# Patient Record
Sex: FEMALE | Race: BLACK OR AFRICAN AMERICAN | NOT HISPANIC OR LATINO | Employment: OTHER | URBAN - METROPOLITAN AREA
[De-identification: names, ages, dates, MRNs, and addresses within clinical notes are randomized per-mention and may not be internally consistent; named-entity substitution may affect disease eponyms.]

---

## 2023-04-13 DIAGNOSIS — I10 PRIMARY HYPERTENSION: Primary | ICD-10-CM

## 2023-04-13 RX ORDER — AMLODIPINE AND BENAZEPRIL HYDROCHLORIDE 10; 40 MG/1; MG/1
1 CAPSULE ORAL DAILY
COMMUNITY
Start: 2022-09-22 | End: 2023-04-13 | Stop reason: SDUPTHER

## 2023-04-13 RX ORDER — AMLODIPINE AND BENAZEPRIL HYDROCHLORIDE 10; 40 MG/1; MG/1
1 CAPSULE ORAL DAILY
Qty: 30 CAPSULE | Refills: 0 | Status: SHIPPED | OUTPATIENT
Start: 2023-04-13 | End: 2024-03-29 | Stop reason: SDUPTHER

## 2023-04-20 ENCOUNTER — TELEPHONE (OUTPATIENT)
Dept: PRIMARY CARE | Facility: CLINIC | Age: 68
End: 2023-04-20
Payer: MEDICARE

## 2023-04-20 DIAGNOSIS — R07.9 CHEST PAIN, UNSPECIFIED TYPE: ICD-10-CM

## 2023-04-20 NOTE — TELEPHONE ENCOUNTER
Patient complains of chest pain. Asking to be seen [facility]. Your schedule is full. Please advise.

## 2023-10-08 PROBLEM — A60.09 GENITAL HERPES IN WOMEN: Status: ACTIVE | Noted: 2023-10-08

## 2023-10-08 PROBLEM — M79.652 PAIN OF LEFT THIGH: Status: ACTIVE | Noted: 2023-10-08

## 2023-10-08 PROBLEM — R07.89 ATYPICAL CHEST PAIN: Status: ACTIVE | Noted: 2023-10-08

## 2023-10-08 PROBLEM — J34.89 NASAL AND SINUS DISCHARGE: Status: ACTIVE | Noted: 2023-10-08

## 2023-10-08 PROBLEM — M54.32 SCIATICA OF LEFT SIDE: Status: ACTIVE | Noted: 2023-10-08

## 2023-10-08 PROBLEM — R07.9 CHEST PAIN: Status: ACTIVE | Noted: 2023-10-08

## 2023-10-08 PROBLEM — N63.31 UNSPECIFIED LUMP IN AXILLARY TAIL OF THE RIGHT BREAST: Status: ACTIVE | Noted: 2023-10-08

## 2023-10-08 PROBLEM — N63.20 BREAST MASS, LEFT: Status: ACTIVE | Noted: 2023-10-08

## 2023-10-08 PROBLEM — M79.605 LEFT LEG PAIN: Status: ACTIVE | Noted: 2023-10-08

## 2023-10-08 PROBLEM — J32.4 CHRONIC PANSINUSITIS: Status: ACTIVE | Noted: 2023-10-08

## 2023-10-08 PROBLEM — N64.59 NIPPLE PROBLEM: Status: ACTIVE | Noted: 2023-10-08

## 2023-10-08 PROBLEM — M43.6 NECK STIFFNESS: Status: ACTIVE | Noted: 2023-10-08

## 2023-10-08 PROBLEM — R29.6 FREQUENT FALLS: Status: ACTIVE | Noted: 2023-10-08

## 2023-10-08 PROBLEM — R63.4 WEIGHT LOSS, UNINTENTIONAL: Status: ACTIVE | Noted: 2023-10-08

## 2023-10-08 PROBLEM — R22.31 MASS OF RIGHT AXILLA: Status: ACTIVE | Noted: 2023-10-08

## 2023-10-08 PROBLEM — R26.9 GAIT ABNORMALITY: Status: ACTIVE | Noted: 2023-10-08

## 2023-10-08 PROBLEM — R10.9 ABDOMINAL PAIN: Status: ACTIVE | Noted: 2023-10-08

## 2023-10-08 PROBLEM — M25.511 PAIN IN RIGHT SHOULDER: Status: ACTIVE | Noted: 2023-10-08

## 2023-10-08 PROBLEM — E87.6 HYPOKALEMIA: Status: ACTIVE | Noted: 2023-10-08

## 2023-10-08 PROBLEM — E78.5 HLD (HYPERLIPIDEMIA): Status: ACTIVE | Noted: 2023-10-08

## 2023-10-08 PROBLEM — M96.1 CERVICAL POST-LAMINECTOMY SYNDROME: Status: ACTIVE | Noted: 2023-10-08

## 2023-10-08 PROBLEM — C50.919 BREAST CANCER (MULTI): Status: ACTIVE | Noted: 2023-10-08

## 2023-10-08 PROBLEM — N63.10 BREAST MASS, RIGHT: Status: ACTIVE | Noted: 2023-10-08

## 2023-10-08 PROBLEM — R92.8 ABNORMAL MAMMOGRAM: Status: ACTIVE | Noted: 2023-10-08

## 2023-10-08 PROBLEM — D80.2 IGA DEFICIENCY (MULTI): Status: ACTIVE | Noted: 2023-10-08

## 2023-10-08 PROBLEM — R07.89 CHEST WALL PAIN: Status: ACTIVE | Noted: 2023-10-08

## 2023-10-08 PROBLEM — I10 BENIGN ESSENTIAL HTN: Status: ACTIVE | Noted: 2023-10-08

## 2023-10-08 PROBLEM — L43.8 OTHER LICHEN PLANUS: Status: ACTIVE | Noted: 2020-05-11

## 2023-10-08 PROBLEM — G72.9 CERVICAL MYOPATHY: Status: ACTIVE | Noted: 2023-10-08

## 2023-10-08 PROBLEM — S42.279D: Status: ACTIVE | Noted: 2023-10-08

## 2023-10-08 PROBLEM — M25.561 RIGHT KNEE PAIN: Status: ACTIVE | Noted: 2023-10-08

## 2023-10-08 PROBLEM — J34.3 HYPERTROPHY OF BOTH INFERIOR NASAL TURBINATES: Status: ACTIVE | Noted: 2023-10-08

## 2023-10-08 PROBLEM — M25.562 LEFT KNEE PAIN: Status: ACTIVE | Noted: 2023-10-08

## 2023-10-08 PROBLEM — R00.1 SINUS BRADYCARDIA: Status: ACTIVE | Noted: 2023-10-08

## 2023-10-08 PROBLEM — M79.18 PAIN IN LEFT BUTTOCK: Status: ACTIVE | Noted: 2023-10-08

## 2023-10-08 PROBLEM — R59.1 LYMPHADENOPATHY: Status: ACTIVE | Noted: 2023-10-08

## 2023-10-08 PROBLEM — T81.30XA WOUND DEHISCENCE: Status: ACTIVE | Noted: 2023-10-08

## 2023-10-08 PROBLEM — E11.9 DM (DIABETES MELLITUS), TYPE 2 (MULTI): Status: ACTIVE | Noted: 2023-10-08

## 2023-10-08 PROBLEM — S42.211S: Status: ACTIVE | Noted: 2023-10-08

## 2023-10-08 PROBLEM — R21 RASH AND OTHER NONSPECIFIC SKIN ERUPTION: Status: ACTIVE | Noted: 2020-05-11

## 2023-10-08 PROBLEM — L02.91 ABSCESS: Status: ACTIVE | Noted: 2023-10-08

## 2023-10-08 PROBLEM — R59.0 AXILLARY ADENOPATHY: Status: ACTIVE | Noted: 2023-10-08

## 2023-10-08 RX ORDER — ACETAMINOPHEN 325 MG/1
650 TABLET ORAL EVERY 6 HOURS PRN
COMMUNITY
Start: 2021-07-28

## 2023-10-08 RX ORDER — PREDNISONE 20 MG/1
20 TABLET ORAL
COMMUNITY
Start: 2019-10-25

## 2023-10-08 RX ORDER — DOCUSATE SODIUM 100 MG/1
100 CAPSULE, LIQUID FILLED ORAL 2 TIMES DAILY
COMMUNITY
Start: 2021-07-28

## 2023-10-08 RX ORDER — CETIRIZINE HYDROCHLORIDE 10 MG/1
1 TABLET ORAL DAILY
COMMUNITY
Start: 2022-09-13

## 2023-10-08 RX ORDER — CEPHALEXIN 500 MG/1
1 TABLET ORAL 3 TIMES DAILY
COMMUNITY
Start: 2023-03-06

## 2023-10-08 RX ORDER — NITROGLYCERIN 0.4 MG/1
0.4 TABLET SUBLINGUAL EVERY 5 MIN PRN
COMMUNITY
Start: 2021-06-25

## 2023-10-08 RX ORDER — BETAMETHASONE DIPROPIONATE 0.5 MG/G
1 OINTMENT TOPICAL
COMMUNITY
Start: 2019-10-25

## 2023-10-08 RX ORDER — CYCLOBENZAPRINE HCL 10 MG
1 TABLET ORAL EVERY 8 HOURS PRN
COMMUNITY
Start: 2021-08-10

## 2023-10-08 RX ORDER — TRIAMCINOLONE ACETONIDE 1 MG/G
1 CREAM TOPICAL
COMMUNITY
Start: 2019-10-14

## 2023-10-08 RX ORDER — METFORMIN HYDROCHLORIDE 500 MG/1
1 TABLET ORAL
COMMUNITY
Start: 2020-11-09

## 2023-10-08 RX ORDER — IBUPROFEN 600 MG/1
1 TABLET ORAL 3 TIMES DAILY
COMMUNITY
Start: 2021-05-10

## 2023-10-08 RX ORDER — FLUTICASONE PROPIONATE 50 MCG
2 SPRAY, SUSPENSION (ML) NASAL DAILY
COMMUNITY
Start: 2023-03-29

## 2023-10-08 RX ORDER — TAMOXIFEN CITRATE 20 MG/1
1 TABLET ORAL DAILY
COMMUNITY
End: 2024-04-15 | Stop reason: ALTCHOICE

## 2023-10-08 RX ORDER — AMLODIPINE AND BENAZEPRIL HYDROCHLORIDE 10; 20 MG/1; MG/1
1 CAPSULE ORAL DAILY
COMMUNITY
Start: 2022-09-22 | End: 2024-03-29 | Stop reason: WASHOUT

## 2023-10-08 RX ORDER — LIDOCAINE 50 MG/G
PATCH TOPICAL
COMMUNITY
Start: 2021-07-28

## 2023-10-08 RX ORDER — ATORVASTATIN CALCIUM 20 MG/1
1 TABLET, FILM COATED ORAL DAILY
COMMUNITY
Start: 2020-11-09

## 2023-10-09 ENCOUNTER — PROCEDURE VISIT (OUTPATIENT)
Dept: DERMATOLOGY | Facility: CLINIC | Age: 68
End: 2023-10-09
Payer: MEDICARE

## 2023-10-09 DIAGNOSIS — L72.3 SEBACEOUS CYST: ICD-10-CM

## 2023-10-09 PROCEDURE — 12031 INTMD RPR S/A/T/EXT 2.5 CM/<: CPT | Performed by: STUDENT IN AN ORGANIZED HEALTH CARE EDUCATION/TRAINING PROGRAM

## 2023-10-09 PROCEDURE — 88305 TISSUE EXAM BY PATHOLOGIST: CPT | Performed by: DERMATOLOGY

## 2023-10-09 PROCEDURE — 11402 EXC TR-EXT B9+MARG 1.1-2 CM: CPT | Performed by: STUDENT IN AN ORGANIZED HEALTH CARE EDUCATION/TRAINING PROGRAM

## 2023-10-09 PROCEDURE — 88305 TISSUE EXAM BY PATHOLOGIST: CPT

## 2023-10-11 LAB
LABORATORY COMMENT REPORT: NORMAL
PATH REPORT.FINAL DX SPEC: NORMAL
PATH REPORT.GROSS SPEC: NORMAL
PATH REPORT.RELEVANT HX SPEC: NORMAL
PATH REPORT.TOTAL CANCER: NORMAL

## 2023-10-23 ENCOUNTER — OFFICE VISIT (OUTPATIENT)
Dept: DERMATOLOGY | Facility: CLINIC | Age: 68
End: 2023-10-23
Payer: MEDICARE

## 2023-10-23 DIAGNOSIS — L72.3 SEBACEOUS CYST: Primary | ICD-10-CM

## 2023-10-23 DIAGNOSIS — Z48.02 ENCOUNTER FOR REMOVAL OF SUTURES: ICD-10-CM

## 2023-10-23 PROCEDURE — 99214 OFFICE O/P EST MOD 30 MIN: CPT | Performed by: STUDENT IN AN ORGANIZED HEALTH CARE EDUCATION/TRAINING PROGRAM

## 2023-10-23 PROCEDURE — 11900 INJECT SKIN LESIONS </W 7: CPT

## 2023-10-23 PROCEDURE — 1159F MED LIST DOCD IN RCRD: CPT | Performed by: STUDENT IN AN ORGANIZED HEALTH CARE EDUCATION/TRAINING PROGRAM

## 2023-10-23 PROCEDURE — 1126F AMNT PAIN NOTED NONE PRSNT: CPT | Performed by: STUDENT IN AN ORGANIZED HEALTH CARE EDUCATION/TRAINING PROGRAM

## 2023-10-23 RX ORDER — DOXYCYCLINE HYCLATE 100 MG
100 TABLET ORAL 2 TIMES DAILY
Qty: 20 TABLET | Refills: 0 | Status: SHIPPED | OUTPATIENT
Start: 2023-10-23 | End: 2023-11-02

## 2023-10-23 NOTE — PROGRESS NOTES
Subjective     Marsha Tapia is a 68 y.o. female who presents for the following: Cyst (FUV - Cyst Removal. Right Axilla.).     Patient s/p excision of lesion in right axilla on 10/9/23 with Dr. Tamayo. Path showed keratin abscess and granuloma. Patient returns to clinic today for suture removal and re-evaluation of sebaceous cyst in right axilla. Patient states that 3 days ago cyst became more painful and started draining yellow/green fluid.     Review of Systems:  No other skin or systemic complaints other than what is documented elsewhere in the note.    The following portions of the chart were reviewed this encounter and updated as appropriate:          Skin Cancer History  No skin cancer on file.      Specialty Problems          Dermatology Problems    Other lichen planus    Rash and other nonspecific skin eruption    Wound dehiscence        Objective   Well appearing patient in no apparent distress; mood and affect are within normal limits.    A focused skin examination was performed. All findings within normal limits unless otherwise noted below.    Assessment/Plan   1. Sebaceous cyst  Right Axilla  Skin colored mobile cyst in right axilla with small amount of yellow/green discharge    -S/p excision on 10/9/23 with Dr. Tamayo. Path demonstrated a keratin abscess and granuloma   -Discussed treatment options including intralesional kenalog vs referral to general surgery for excision. After discussion of risks, benefits, and alternatives patient opts to proceed with intralesional kenalog injections.  -Administered 1cc of intralesional kenalog 10mg/ml to 10 sites 0.1cc per injection at approximately 0.5-1cm apart. Lot # [id number], Exp: 10/2025  -START doxycycline 100mg BID x 10 days. Instructed patient to take with food and do not lie down for at least 30 minutes after.   -RTC in 1 month for re-evaluation and possible repeat ILK injection  -Referral placed to general surgery for surgical consultation.        Intralesional injection - Right Axilla  Intralesional Injection:   Date/Time: 10/23/2023 9:14 AM    Consent:     Consent obtained:  Verbal    Consent given by:  Patient    Risks discussed:  Poor cosmetic result, pain, infection and bleeding    Alternatives discussed:  No treatment and alternative treatment  Pre-Procedure Details:   Timeout:  patient name, date of birth, surgical site, and procedure verified    Prep Type:  Isopropyl alcohol  Procedure Details:   Injection:  Triamcinolone  Outcome: patient tolerated procedure well  Post-procedure details: sterile dressing applied and wound care instructions given  Dressing type: bandage     Related Procedures  Referral to General Surgery  Follow Up In Dermatology - Established Patient    Related Medications  doxycycline (Vibra-Tabs) 100 mg tablet  Take 1 tablet (100 mg) by mouth 2 times a day for 10 days. Take with a full glass of water and do not lie down for at least 30 minutes after.    triamcinolone acetonide (Kenalog) injection 1 mg      2. Encounter for removal of sutures  Right Axilla  Skin well approximated. Sutures intact.     -Sutures removed.       RTC in 4 weeks for re-evaluation and possible repeat ILK injections.

## 2023-11-21 ENCOUNTER — OFFICE VISIT (OUTPATIENT)
Dept: DERMATOLOGY | Facility: CLINIC | Age: 68
End: 2023-11-21
Payer: MEDICARE

## 2023-11-21 DIAGNOSIS — L72.3 SEBACEOUS CYST: ICD-10-CM

## 2023-11-21 PROCEDURE — 99213 OFFICE O/P EST LOW 20 MIN: CPT | Performed by: STUDENT IN AN ORGANIZED HEALTH CARE EDUCATION/TRAINING PROGRAM

## 2023-11-21 PROCEDURE — 1159F MED LIST DOCD IN RCRD: CPT | Performed by: STUDENT IN AN ORGANIZED HEALTH CARE EDUCATION/TRAINING PROGRAM

## 2023-11-21 PROCEDURE — 1126F AMNT PAIN NOTED NONE PRSNT: CPT | Performed by: STUDENT IN AN ORGANIZED HEALTH CARE EDUCATION/TRAINING PROGRAM

## 2023-11-21 ASSESSMENT — DERMATOLOGY PATIENT ASSESSMENT
DO YOU HAVE ANY NEW OR CHANGING LESIONS: NO
DO YOU HAVE IRREGULAR MENSTRUAL CYCLES: NO
HAVE YOU HAD OR DO YOU HAVE A STAPH INFECTION: NO
DO YOU USE A TANNING BED: NO
ARE YOU AN ORGAN TRANSPLANT RECIPIENT: NO
ARE YOU ON BIRTH CONTROL: NO
ARE YOU TRYING TO GET PREGNANT: NO
HAVE YOU HAD OR DO YOU HAVE VASCULAR DISEASE: NO

## 2023-11-21 ASSESSMENT — DERMATOLOGY QUALITY OF LIFE (QOL) ASSESSMENT
RATE HOW EMOTIONALLY BOTHERED YOU ARE BY YOUR SKIN PROBLEM (FOR EXAMPLE, WORRY, EMBARRASSMENT, FRUSTRATION): 0 - NEVER BOTHERED
ARE THERE EXCLUSIONS OR EXCEPTIONS FOR THE QUALITY OF LIFE ASSESSMENT: NO
DID YOU DOCUMENT A PATIENT REASON(S) FOR NOT USING THE QUALITY OF LIFE ASSESSMENT: NO
RATE HOW BOTHERED YOU ARE BY SYMPTOMS OF YOUR SKIN PROBLEM (EG, ITCHING, STINGING BURNING, HURTING OR SKIN IRRITATION): 0 - NEVER BOTHERED
DID THE PATIENT HAVE A SEVERE MENTAL AND/OR PHYSICAL INCAPACITY THAT PREVENTED HIM OR HER FROM COMPLETING THE ASSESSMENT: NO
WAS THE PATIENT DIAGNOSED WITH A SKIN CONDITION THAT IS INCLUDED IN THE DENOMINATOR DEFINITION (E.G.PSORIASIS, DERMATITIS) BUT IDENTIFIED A SKIN CONDITION THAT IS NOT (E.G. MELANOMA, NEVI) THE MAIN CONDITION ON THEIR ASSESSMENT: NO
DATE THE QUALITY-OF-LIFE ASSESSMENT WAS COMPLETED: 66799
RATE HOW BOTHERED YOU ARE BY EFFECTS OF YOUR SKIN PROBLEMS ON YOUR ACTIVITIES (EG, GOING OUT, ACCOMPLISHING WHAT YOU WANT, WORK ACTIVITIES OR YOUR RELATIONSHIPS WITH OTHERS): 0 - NEVER BOTHERED

## 2023-11-21 ASSESSMENT — ITCH NUMERIC RATING SCALE: HOW SEVERE IS YOUR ITCHING?: 0

## 2023-12-12 ENCOUNTER — TELEPHONE (OUTPATIENT)
Dept: DERMATOLOGY | Facility: CLINIC | Age: 68
End: 2023-12-12
Payer: MEDICARE

## 2023-12-15 NOTE — PROGRESS NOTES
I was present during all key portions of visit including history, exam, discussion/plan and/or procedures and directly supervised our resident during all portions of the visit, follow up care, medications and more    MD Maximiliano English MD

## 2023-12-17 ENCOUNTER — HOSPITAL ENCOUNTER (EMERGENCY)
Facility: HOSPITAL | Age: 68
Discharge: HOME | End: 2023-12-17
Attending: EMERGENCY MEDICINE
Payer: MEDICARE

## 2023-12-17 VITALS
WEIGHT: 185 LBS | BODY MASS INDEX: 31.58 KG/M2 | TEMPERATURE: 99 F | HEIGHT: 64 IN | DIASTOLIC BLOOD PRESSURE: 100 MMHG | SYSTOLIC BLOOD PRESSURE: 173 MMHG | HEART RATE: 63 BPM | RESPIRATION RATE: 16 BRPM | OXYGEN SATURATION: 97 %

## 2023-12-17 DIAGNOSIS — B34.9 VIRAL ILLNESS: Primary | ICD-10-CM

## 2023-12-17 LAB
FLUAV RNA RESP QL NAA+PROBE: NOT DETECTED
FLUBV RNA RESP QL NAA+PROBE: NOT DETECTED
SARS-COV-2 RNA RESP QL NAA+PROBE: NOT DETECTED

## 2023-12-17 PROCEDURE — 87636 SARSCOV2 & INF A&B AMP PRB: CPT | Performed by: STUDENT IN AN ORGANIZED HEALTH CARE EDUCATION/TRAINING PROGRAM

## 2023-12-17 PROCEDURE — 2500000004 HC RX 250 GENERAL PHARMACY W/ HCPCS (ALT 636 FOR OP/ED): Performed by: STUDENT IN AN ORGANIZED HEALTH CARE EDUCATION/TRAINING PROGRAM

## 2023-12-17 PROCEDURE — 99284 EMERGENCY DEPT VISIT MOD MDM: CPT | Mod: 25 | Performed by: EMERGENCY MEDICINE

## 2023-12-17 PROCEDURE — 96374 THER/PROPH/DIAG INJ IV PUSH: CPT

## 2023-12-17 RX ORDER — ACETAMINOPHEN 325 MG/1
650 TABLET ORAL ONCE
Status: COMPLETED | OUTPATIENT
Start: 2023-12-17 | End: 2023-12-17

## 2023-12-17 RX ORDER — HYDRALAZINE HYDROCHLORIDE 20 MG/ML
10 INJECTION INTRAMUSCULAR; INTRAVENOUS ONCE
Status: COMPLETED | OUTPATIENT
Start: 2023-12-17 | End: 2023-12-17

## 2023-12-17 RX ADMIN — ACETAMINOPHEN 650 MG: 325 TABLET ORAL at 10:52

## 2023-12-17 RX ADMIN — HYDRALAZINE HYDROCHLORIDE 10 MG: 20 INJECTION INTRAMUSCULAR; INTRAVENOUS at 10:53

## 2023-12-17 ASSESSMENT — COLUMBIA-SUICIDE SEVERITY RATING SCALE - C-SSRS
2. HAVE YOU ACTUALLY HAD ANY THOUGHTS OF KILLING YOURSELF?: NO
6. HAVE YOU EVER DONE ANYTHING, STARTED TO DO ANYTHING, OR PREPARED TO DO ANYTHING TO END YOUR LIFE?: NO
1. IN THE PAST MONTH, HAVE YOU WISHED YOU WERE DEAD OR WISHED YOU COULD GO TO SLEEP AND NOT WAKE UP?: NO

## 2023-12-17 ASSESSMENT — PAIN - FUNCTIONAL ASSESSMENT: PAIN_FUNCTIONAL_ASSESSMENT: 0-10

## 2023-12-17 ASSESSMENT — PAIN SCALES - GENERAL
PAINLEVEL_OUTOF10: 5 - MODERATE PAIN
PAINLEVEL_OUTOF10: 0 - NO PAIN
PAINLEVEL_OUTOF10: 0 - NO PAIN

## 2023-12-17 ASSESSMENT — PAIN DESCRIPTION - LOCATION: LOCATION: HEAD

## 2023-12-17 NOTE — ED PROVIDER NOTES
HPI:  Marsha Tapia is a 68 y.o. with a history of diabetes, hypertension, breast cancer status post chemoradiation in remission, R axillary sebaceous cyst, presents the emergency department with multiple concerns.  Patient states that she is primarily here due to concern for a recurrent right axillary abscess.  Endorses that she has had drainage and a shot done by dermatology, most recently in October.  At that time was recommended to follow-up with general surgery however due to resolution of the cyst, has not yet followed up with them.  Endorses since Thursday, she has noticed pain in her right axilla, noticed some drainage which she describes as yellow and brown intermittently.  Does also endorse congestion, cough, noting that her 7-year-old family member has been feeling ill with similar symptoms.  She also states she has a slight headache, has been taking Mucinex, is unsure if this headache is related to her congestion or her blood pressure.  Denies any missed doses of her recent blood pressure medication.  She denies any chest pain, shortness of breath, nausea, vomiting, abdominal pain.  States she is otherwise well and healthy at her baseline state of health.  Limitations to History: None    External Records Reviewed: I reviewed recent and relevant outside records including: Outpatient records including dermatology visit most recently 11/21.  Of note, patient did have biopsy of that right axillary cyst showing keratin abscess and granuloma, was given intralesional Kenalog and Doxy.  Patient states she did attempt a follow-up with her dermatologist regarding this cyst however he is on vacation and she was not able to get an appointment soon.    ------------------------------------------------------------------------------------------------------------------------------------------    Physical Exam:    ED Triage Vitals [12/17/23 0952]   Temp Heart Rate Resp BP   37.2 °C (99 °F) 68 20 (!) 209/62      SpO2 Temp  Source Heart Rate Source Patient Position   97 % Temporal Monitor Sitting      BP Location FiO2 (%)     Left arm --         Gen: Alert, NAD   Head/Neck: NCAT, neck w/ FROM  Eyes: EOMI, PERRL, anicteric sclerae, noninjected conjunctivae  Nose: Nares patent w/o rhinorrhea  Mouth:  MMM, no OP lesions noted  Heart: RRR, well perfused  Lungs: CTA b/l no RRW, no increased work of breathing  Abdomen: soft, NT, ND, no rebound guarding or rigidity  Musculoskeletal: no joint swelling noted  Extremities: Warm, well perfused. Compartments soft, nontender. 2+ distal pulses, nontender to palpation.   Neurologic: Alert, symmetrical facies, phonates clearly, moves all extremities equally, responsive to touch, ambulates normally. CN II-XII intact.   Skin: Warm, dry. Small raised lesion R axilla with open area with minimal fatty tissue protrusion, no significant erythema, ttp or induration   Psychological: calm, no SI/HI    ------------------------------------------------------------------------------------------------------------------------------------------    Medical Decision Making  68-year-old female with past medical history of diabetes, hypertension, breast cancer status post chemo and radiation in remission, recurrent right axillary cyst, presenting to the emergency department with multiple concerns, primarily concerned regarding drainage from right axillary cyst.  Additionally endorsing congestion, mild headache, cough with positive sick contacts recently.  Vital signs on arrival do show elevated blood pressure, remaining are unremarkable.  Patient is nontoxic, neurologically intact, exam is as above.  No significant fluid collection appreciated on ultrasound, patient does have a small area of raised lesion in the right axilla, with open area, does appear to be fatty tissue, no significant purulence, erythema.  She is complaining of mild headache, frontal, is hypertensive will administer hydralazine, Tylenol and reevaluate.   COVID and flu swab sent and pending.  Headache is mild, patient is neurologically intact, at this time, do not believe that she needs a head CT.  Will additionally defer lab work at this time, she is very well-appearing, low suspicion for acute end organ damage from elevated blood pressure.  Blood pressure is improved, patient feels improved.  COVID and flu swabs are negative.  Discussed return precautions with the patient, she is discharged home in stable condition with close outpatient follow-up.    Diagnoses as of 12/17/23 1220   Viral illness        Procedures       Clinical Impression: congestion     Dispo: dc     Discussed with ED Attending, Dr. Calin Olivera, DO   Emergency Medicine, PGY3      Trey Olivera, DO  Resident  12/17/23 1220

## 2023-12-17 NOTE — DISCHARGE INSTRUCTIONS
Please make sure you follow-up with your primary care doctor, your dermatologist as discussed. You should return to the emergency department for any worsening or persistent symptoms including fevers, increasing area of redness, drainage, or if  the drain is start to smell bad.

## 2023-12-21 ENCOUNTER — OFFICE VISIT (OUTPATIENT)
Dept: DERMATOLOGY | Facility: CLINIC | Age: 68
End: 2023-12-21
Payer: MEDICARE

## 2023-12-21 DIAGNOSIS — L72.3 SEBACEOUS CYST: ICD-10-CM

## 2023-12-21 PROCEDURE — 87205 SMEAR GRAM STAIN: CPT

## 2023-12-21 PROCEDURE — 11900 INJECT SKIN LESIONS </W 7: CPT

## 2023-12-21 PROCEDURE — 1126F AMNT PAIN NOTED NONE PRSNT: CPT

## 2023-12-21 PROCEDURE — 1159F MED LIST DOCD IN RCRD: CPT

## 2023-12-21 PROCEDURE — 99204 OFFICE O/P NEW MOD 45 MIN: CPT

## 2023-12-21 RX ORDER — DOXYCYCLINE 100 MG/1
CAPSULE ORAL
Qty: 11 CAPSULE | Refills: 0 | Status: SHIPPED | OUTPATIENT
Start: 2023-12-21 | End: 2023-12-22

## 2023-12-22 RX ORDER — DOXYCYCLINE 100 MG/1
CAPSULE ORAL
Qty: 10 CAPSULE | Refills: 0 | Status: SHIPPED | OUTPATIENT
Start: 2023-12-22

## 2023-12-23 DIAGNOSIS — L72.3 SEBACEOUS CYST: Primary | ICD-10-CM

## 2023-12-23 RX ORDER — AMOXICILLIN AND CLAVULANATE POTASSIUM 500; 125 MG/1; MG/1
500 TABLET, FILM COATED ORAL 2 TIMES DAILY
Qty: 20 TABLET | Refills: 0 | Status: SHIPPED | OUTPATIENT
Start: 2023-12-23 | End: 2024-01-02

## 2023-12-23 NOTE — PROGRESS NOTES
Culture resulted showing group A strep. Changing from doxy to augmentin. Left pt a vm on need for picking up new medication and to stop taking doxy.

## 2023-12-24 LAB
BACTERIA SPEC CULT: ABNORMAL
BACTERIA SPEC CULT: ABNORMAL
GRAM STN SPEC: ABNORMAL
GRAM STN SPEC: ABNORMAL

## 2023-12-27 NOTE — PROGRESS NOTES
Subjective   HPI: Marsha Tapia is a 68 y.o. female is here for evaluation and treatment of an extremely painful sebaceous cyst underneath the right arm.     Patient has been being seen by Dr. Tamayo.  She is status post excision which was performed on 10/9/2023 by Dr. Tamayo.  During that time the path pathology demonstrated keratin abscess and granuloma.  On 10/23/2023 patient was seen by Dr. Tamayo for suture removal during that time she was prescribed doxycycline 100 mg tablet p.o. twice daily and given an intralesional injection of Kenalog 1 mg due to the area being painful and draining purulent material.  At the appointment on 10/23/2023 the wound was well-approximated.  During appointment on 10/23/2023 referral was placed for general surgery for complete excision of the sebaceous cyst as not all of it was removed. She was seen again by Dr. Tamayo of on 11/21/2023 at which time the cyst was no longer painful and had shrunk in size being 7 mm.  It was discussed at the appointment on 11/21/2023 that if the cyst had become inflamed again to schedule an appointment for repeat intralesional Kenalog.    On 12/17/2023 patient went to the ER for multiple concerns however her main concern was for a recurrent right axillary abscess.  Per note - she has a small ulcerated lesion to the right axilla that shows some mild serosanguineous fluid.  No purulence, erythema, tenderness to palpation and will not express fluid on attempt.  Does not appear infected at this time necessitating antibiotics. -No cultures were performed.    Patient is here today for an extremely painful draining cyst underneath the right arm.  She states that it has been draining for the past couple of days purulent material.  She is extremely nervous as she is recently become sick with a viral URI infection and is afraid that the cyst is causing systemic symptoms.  She is unhappy with what happened in the ER as she was not giving antibiotics for the cyst  and no cultures were performed.    ROS: No other skin or systemic complaints other than what is documented elsewhere in the note.    ALLERGIES: Patient has no known allergies.    SOCIAL:  reports that she has never smoked. She does not have any smokeless tobacco history on file. No history on file for alcohol use and drug use.    Objective   Right Axilla  Golf ball sized cyst with keratin material and serosanguineous fluid that is extremely tender to palpation and is erythematous        Assessment/Plan   1. Sebaceous cyst  Right Axilla    Discussed with patient that this is an active flare of the cyst once again.  I recommended starting doxycycline 100 mg capsule 2 today and then 1 p.o. daily x 9 days.  I performed a culture of the site and may need to switch antibiotics per culture results.  I recommended intralesional Kenalog injection as that seem to help in the past.     I discussed with patient that I cannot drain the area as it does not look as if there is material to drain.  I tried pressing on the area and only serosanguineous fluid was expressed.      I would like to see the patient back in 2 weeks.    Discussed wound care with patient.  Patient verbalizes understanding and is agreeable to this plan.    Specimen A - Tissue/Wound Culture/Smear  Differential Diagnosis: Cyst   Check Margins Yes/No?:    Comments:    [facility]ermpath Lab: N/A    Related Medications  triamcinolone acetonide (Kenalog) injection 1 mg      triamcinolone acetonide (Kenalog) injection 10 mg      doxycycline (Vibramycin) 100 mg capsule  Take 1 capsule PO every day x10 days. Take with at least 8 ounces (large glass) of water, do not lie down for 30 minutes after.         A significant amount of time was spent reading other providers notes and reviewing labs from other providers.    FOLLOW UP: 2 weeks    The patient was encouraged to contact me with any further questions or concerns.  Kellee Shell PA-C  12/27/2023

## 2024-01-01 NOTE — PROGRESS NOTES
Subjective   HPI: Marsha Tapia is a 68 y.o. female is here for 2 week follow up on sebaceus cyst of right armpit. Started on doxy 12/21/23 and culture done, switched to Augmentin 12/23/23 as culture grew GAS. At appt 12/21 intralesional kenalog was given.  Patient did  her and complete the Augmentin prescription.  Patient has noticed a significant improvement in pain and drainage of the site.  She does continue to have a little bit of seepage however nowhere near as much as before.    ROS: No other skin or systemic complaints other than what is documented elsewhere in the note.    ALLERGIES: Patient has no known allergies.    SOCIAL:  reports that she has never smoked. She does not have any smokeless tobacco history on file. No history on file for alcohol use and drug use.    Objective   Right Axilla  2.5 cm subcutaneous, mobile nodule with a central punctum. No longer inflamed. Minimal clear drainage. Significant improvement since last visit 12/21/23.        Assessment/Plan   1. Epidermoid cyst  Right Axilla    Patient is doing significantly better this visit.  Discussed with patient that the area needs completely excised with surgical removal.  Discussed with patient that this can only be done if the area is not inflamed.  I recommended if the area does become inflamed to please schedule an appointment and I will see her same day for intralesional Kenalog injections.  I recommended using Dove soap to clean with once daily.  If she is experiencing any drainage please come back in office.  I also recommended using bacitracin or Polysporin as a topical antibiotic.  Patient verbalizes understanding.         FOLLOW UP: Please schedule total excision of epidermal inclusion cyst of right axilla    The patient was encouraged to contact me with any further questions or concerns.  Kellee Shell PA-C  1/6/2024

## 2024-01-04 ENCOUNTER — OFFICE VISIT (OUTPATIENT)
Dept: DERMATOLOGY | Facility: CLINIC | Age: 69
End: 2024-01-04
Payer: MEDICARE

## 2024-01-04 DIAGNOSIS — L72.0 EPIDERMOID CYST: ICD-10-CM

## 2024-01-04 PROCEDURE — 1126F AMNT PAIN NOTED NONE PRSNT: CPT

## 2024-01-04 PROCEDURE — 99212 OFFICE O/P EST SF 10 MIN: CPT

## 2024-01-04 PROCEDURE — 1159F MED LIST DOCD IN RCRD: CPT

## 2024-01-12 ENCOUNTER — APPOINTMENT (OUTPATIENT)
Dept: DERMATOLOGY | Facility: CLINIC | Age: 69
End: 2024-01-12
Payer: MEDICARE

## 2024-01-15 ENCOUNTER — TELEPHONE (OUTPATIENT)
Dept: DERMATOLOGY | Facility: CLINIC | Age: 69
End: 2024-01-15
Payer: MEDICARE

## 2024-01-15 NOTE — TELEPHONE ENCOUNTER
PT called wanting to speak with you about what the next steps in care are for her (Cyst under Right Arm), she states she has not heard back from the other office about scheduling - please advise

## 2024-01-30 ENCOUNTER — PROCEDURE VISIT (OUTPATIENT)
Dept: DERMATOLOGY | Facility: CLINIC | Age: 69
End: 2024-01-30
Payer: MEDICARE

## 2024-01-30 DIAGNOSIS — L72.0 EPIDERMAL CYST: Primary | ICD-10-CM

## 2024-01-30 PROCEDURE — 99213 OFFICE O/P EST LOW 20 MIN: CPT | Performed by: SPECIALIST

## 2024-01-30 PROCEDURE — 87070 CULTURE OTHR SPECIMN AEROBIC: CPT | Performed by: SPECIALIST

## 2024-01-30 RX ORDER — AMOXICILLIN AND CLAVULANATE POTASSIUM 500; 125 MG/1; MG/1
500 TABLET, FILM COATED ORAL 2 TIMES DAILY
Qty: 20 TABLET | Refills: 0 | Status: SHIPPED | OUTPATIENT
Start: 2024-01-30 | End: 2024-02-09

## 2024-01-30 RX ORDER — MUPIROCIN 20 MG/G
OINTMENT TOPICAL
Qty: 15 G | Refills: 0 | Status: SHIPPED | OUTPATIENT
Start: 2024-01-30

## 2024-01-30 RX ORDER — FOAM BANDAGE 7" X 7"
BANDAGE TOPICAL
Qty: 10 EACH | Refills: 0 | Status: SHIPPED | OUTPATIENT
Start: 2024-01-30

## 2024-01-30 NOTE — PROGRESS NOTES
Subjective     Marsha Tapia is a 68 y.o. female who presents for the following: No chief complaint on file..     Review of Systems:  No other skin or systemic complaints other than what is documented elsewhere in the note.    The following portions of the chart were reviewed this encounter and updated as appropriate:         Skin Cancer History  No skin cancer on file.      Specialty Problems          Dermatology Problems    Other lichen planus    Rash and other nonspecific skin eruption    Wound dehiscence        Objective   Well appearing patient in no apparent distress; mood and affect are within normal limits.    A full examination was performed including scalp, head, eyes, ears, nose, lips, neck, chest, axillae, abdomen, back, buttocks, bilateral upper extremities, bilateral lower extremities, hands, feet, fingers, toes, fingernails, and toenails. All findings within normal limits unless otherwise noted below.    Assessment/Plan   1. Epidermal cyst  Right Axilla    Specimen A - Tissue/Wound Culture/Smear  Differential Diagnosis: cyst   Check Margins Yes/No?:    Comments:    [facility]erm[facility] Lab: N/A

## 2024-01-30 NOTE — PROGRESS NOTES
"Subjective   HPI: Marsha Tapia is a 68 y.o. female is here for evaluation and treatment of a cyst under my right arm.    ROS: No other skin or systemic complaints other than what is documented elsewhere in the note.    ALLERGIES: Patient has no known allergies.    SOCIAL:  reports that she has never smoked. She does not have any smokeless tobacco history on file. No history on file for alcohol use and drug use.    Objective     Description: This cyst has ruptured leading to purulent drainage within the axillary involved erythema and inflammation.  No surgical goal excision can be done today.  Unfortunately patient was quite upset and could not understand why surgery could not be performed.  I explained to her several times that no cyst wall was present there was only infection and inflammation and surgery could not be done in an area of this nature.    Assessment/Plan   1. Epidermal cyst  Right Axilla    Specimen A - Tissue/Wound Culture/Smear  Differential Diagnosis: cyst   Check Margins Yes/No?:    Comments:    [facility]erm[facility] Lab: N/A    Related Medications  foam bandage (Allevyn) 2 X 2 \" bandage  Apply to right axilla as directed    mupirocin (Bactroban) 2 % ointment  Apply a thin layer to the affected area three times a day x 1 week    amoxicillin-pot clavulanate (Augmentin) 500-125 mg tablet  Take 1 tablet (500 mg) by mouth 2 times a day for 10 days.    triamcinolone acetonide (Kenalog) injection 10 mg         Plan: Augmentin 500 mg p.o. twice daily for 10 days.  Culture and sensitivity of wound.  Mupirocin ointment 3 times daily.    FOLLOW UP: 2 weeks.    The patient was encouraged to contact me with any further questions or concerns.  John Aguila MD  1/30/2024    "

## 2024-02-12 ENCOUNTER — TELEPHONE (OUTPATIENT)
Dept: DERMATOLOGY | Facility: CLINIC | Age: 69
End: 2024-02-12

## 2024-02-12 NOTE — TELEPHONE ENCOUNTER
Cyst is still draining, have some concerns of an infection. Can you give her a call at your earliest convenience?

## 2024-02-15 ENCOUNTER — APPOINTMENT (OUTPATIENT)
Dept: DERMATOLOGY | Facility: CLINIC | Age: 69
End: 2024-02-15
Payer: MEDICARE

## 2024-03-12 ENCOUNTER — TELEPHONE (OUTPATIENT)
Dept: DERMATOLOGY | Facility: CLINIC | Age: 69
End: 2024-03-12

## 2024-03-12 NOTE — TELEPHONE ENCOUNTER
Marsha is having a problem with the bandages that are supposed to cover the cyst under her arm. Latosha could you call her at your earliest convenience? She thinks there might be a miscommunication with the company who Is supposed to supply them.

## 2024-03-29 ENCOUNTER — OFFICE VISIT (OUTPATIENT)
Dept: PRIMARY CARE | Facility: CLINIC | Age: 69
End: 2024-03-29
Payer: MEDICARE

## 2024-03-29 ENCOUNTER — HOSPITAL ENCOUNTER (OUTPATIENT)
Dept: RADIOLOGY | Facility: CLINIC | Age: 69
Discharge: HOME | End: 2024-03-29
Payer: MEDICARE

## 2024-03-29 VITALS
RESPIRATION RATE: 21 BRPM | OXYGEN SATURATION: 96 % | HEART RATE: 88 BPM | WEIGHT: 167 LBS | HEIGHT: 64 IN | SYSTOLIC BLOOD PRESSURE: 178 MMHG | DIASTOLIC BLOOD PRESSURE: 88 MMHG | BODY MASS INDEX: 28.51 KG/M2

## 2024-03-29 DIAGNOSIS — N61.1 ABSCESS OF THE BREAST AND NIPPLE: ICD-10-CM

## 2024-03-29 DIAGNOSIS — R22.31 MASS OF RIGHT AXILLA: ICD-10-CM

## 2024-03-29 DIAGNOSIS — I10 PRIMARY HYPERTENSION: ICD-10-CM

## 2024-03-29 DIAGNOSIS — R23.4 BREAST SKIN CHANGES: ICD-10-CM

## 2024-03-29 DIAGNOSIS — L02.91 ABSCESS: Primary | ICD-10-CM

## 2024-03-29 DIAGNOSIS — N64.9 DISORDER OF BREAST, UNSPECIFIED: ICD-10-CM

## 2024-03-29 DIAGNOSIS — L30.4 INTERTRIGO: ICD-10-CM

## 2024-03-29 DIAGNOSIS — L02.91 ABSCESS: ICD-10-CM

## 2024-03-29 PROCEDURE — 99214 OFFICE O/P EST MOD 30 MIN: CPT

## 2024-03-29 PROCEDURE — 76982 USE 1ST TARGET LESION: CPT | Mod: 50,RT

## 2024-03-29 PROCEDURE — G0279 TOMOSYNTHESIS, MAMMO: HCPCS | Performed by: RADIOLOGY

## 2024-03-29 PROCEDURE — 77066 DX MAMMO INCL CAD BI: CPT | Performed by: RADIOLOGY

## 2024-03-29 PROCEDURE — 76983 USE EA ADDL TARGET LESION: CPT

## 2024-03-29 PROCEDURE — 76642 ULTRASOUND BREAST LIMITED: CPT | Mod: 50

## 2024-03-29 PROCEDURE — 77062 BREAST TOMOSYNTHESIS BI: CPT

## 2024-03-29 PROCEDURE — 3077F SYST BP >= 140 MM HG: CPT

## 2024-03-29 PROCEDURE — 1159F MED LIST DOCD IN RCRD: CPT

## 2024-03-29 PROCEDURE — 3079F DIAST BP 80-89 MM HG: CPT

## 2024-03-29 PROCEDURE — 76642 ULTRASOUND BREAST LIMITED: CPT | Performed by: RADIOLOGY

## 2024-03-29 RX ORDER — NYSTATIN 100000 U/G
CREAM TOPICAL 2 TIMES DAILY
Qty: 30 G | Refills: 1 | Status: SHIPPED | OUTPATIENT
Start: 2024-03-29 | End: 2024-04-05

## 2024-03-29 RX ORDER — CLINDAMYCIN HYDROCHLORIDE 300 MG/1
300 CAPSULE ORAL 4 TIMES DAILY
Qty: 40 CAPSULE | Refills: 0 | Status: SHIPPED | OUTPATIENT
Start: 2024-03-29 | End: 2024-04-08

## 2024-03-29 RX ORDER — AMLODIPINE AND BENAZEPRIL HYDROCHLORIDE 10; 40 MG/1; MG/1
1 CAPSULE ORAL DAILY
Qty: 90 CAPSULE | Refills: 0 | Status: SHIPPED | OUTPATIENT
Start: 2024-03-29

## 2024-03-29 RX ORDER — HYDROCHLOROTHIAZIDE 12.5 MG/1
12.5 TABLET ORAL DAILY
Qty: 90 TABLET | Refills: 0 | Status: SHIPPED | OUTPATIENT
Start: 2024-03-29

## 2024-03-29 NOTE — PROGRESS NOTES
Primary Care Provider: Stephane Sandhu MD    Subjective   Marsha Tapia is a 68 y.o. female who presents for Follow-up (right breast issue need referral for diagnostic mammogram/Red spot wast there, no lump near nipple area/Has brown spots under her breast and body was under arm they drain and sometimes has an oder).    Had a red spot on her right breast- went away, but then came back    Abscess    Has been seeing dermatology for a right epidermal cyst   Was attempted to be removed but was draining and bleeding  Was given steroids and then started getting the dark spots under her breasts  Was also given Augmentin    ultrasound-guided biopsy of a right  palpable mass. Pathology shows fibrous tissue with marked acute  inflammation, numerous macrophages and granulation tissues consistent  with abscess    HTN    intertrigo           Review of Systems   Constitutional: Negative.  Negative for activity change, appetite change, chills, diaphoresis, fatigue, fever and unexpected weight change.   HENT: Negative.  Negative for congestion, dental problem, ear discharge, ear pain, hearing loss, mouth sores, nosebleeds, postnasal drip, rhinorrhea, sinus pressure, sneezing, sore throat, tinnitus, trouble swallowing and voice change.    Eyes: Negative.  Negative for photophobia, discharge and visual disturbance.   Respiratory: Negative.  Negative for apnea, cough, chest tightness, shortness of breath, wheezing and stridor.    Cardiovascular: Negative.  Negative for chest pain, palpitations and leg swelling.   Gastrointestinal: Negative.  Negative for abdominal distention, abdominal pain, anal bleeding, blood in stool, constipation, diarrhea, nausea and rectal pain.   Endocrine: Negative.  Negative for cold intolerance, heat intolerance, polydipsia, polyphagia and polyuria.   Genitourinary: Negative.  Negative for decreased urine volume, difficulty urinating, dysuria, flank pain, frequency, hematuria and urgency.   Musculoskeletal:  "Negative.  Negative for back pain, gait problem, joint swelling, myalgias, neck pain and neck stiffness.   Skin:         SEE HPI   Neurological: Negative.  Negative for dizziness, tremors, seizures, syncope, speech difficulty, weakness, light-headedness, numbness and headaches.   Hematological: Negative.  Does not bruise/bleed easily.   Psychiatric/Behavioral: Negative.  Negative for agitation, confusion, dysphoric mood, sleep disturbance and suicidal ideas. The patient is not nervous/anxious and is not hyperactive.    All other systems reviewed and are negative.        Objective   /88   Pulse 88   Resp 21   Ht 1.626 m (5' 4\")   Wt 75.8 kg (167 lb)   SpO2 96%   BMI 28.67 kg/m²     Physical Exam  Vitals reviewed.   Constitutional:       General: She is not in acute distress.     Appearance: Normal appearance. She is normal weight. She is not ill-appearing, toxic-appearing or diaphoretic.   HENT:      Head: Normocephalic and atraumatic.      Nose: Nose normal.   Eyes:      Extraocular Movements: Extraocular movements intact.      Conjunctiva/sclera: Conjunctivae normal.      Pupils: Pupils are equal, round, and reactive to light.   Neck:      Vascular: No carotid bruit.   Cardiovascular:      Rate and Rhythm: Normal rate and regular rhythm.      Pulses: Normal pulses.      Heart sounds: Normal heart sounds. No murmur heard.     No friction rub. No gallop.   Pulmonary:      Effort: Pulmonary effort is normal. No respiratory distress.      Breath sounds: Normal breath sounds.   Chest:      Chest wall: No mass.   Breasts:     Right: Skin change present.   Abdominal:      General: Abdomen is flat. Bowel sounds are normal.      Palpations: Abdomen is soft.   Musculoskeletal:         General: Normal range of motion.      Cervical back: Normal range of motion and neck supple.   Lymphadenopathy:      Cervical: No cervical adenopathy.      Upper Body:      Right upper body: No supraclavicular, axillary or pectoral " adenopathy.      Left upper body: No supraclavicular, axillary or pectoral adenopathy.   Skin:     General: Skin is warm and dry.      Capillary Refill: Capillary refill takes less than 2 seconds.   Neurological:      General: No focal deficit present.      Mental Status: She is alert and oriented to person, place, and time. Mental status is at baseline.   Psychiatric:         Mood and Affect: Mood normal.         Behavior: Behavior normal.         Thought Content: Thought content normal.         Judgment: Judgment normal.         Assessment/Plan   Problem List Items Addressed This Visit       Abscess - Primary    Relevant Orders    Referral to High Risk Breast Cancer    Mass of right axilla    Relevant Orders    Referral to High Risk Breast Cancer     Other Visit Diagnoses       Breast skin changes        Relevant Orders    Referral to High Risk Breast Cancer  Start Clindamycin  Diag mammo & US    Primary hypertension        Above goal  Relevant Medications    amLODIPine-benazepriL (Lotrel) 10-40 mg capsule    hydroCHLOROthiazide (Microzide) 12.5 mg tablet    Intertrigo      Nystatin cream    Disorder of breast, unspecified        Abscess of the breast and nipple       Start Clindamycin  Diag mammo & US  Follow up with high risk breast clinic     Follow up in 4-6 weeks or sooner if needed

## 2024-04-15 ENCOUNTER — OFFICE VISIT (OUTPATIENT)
Dept: PRIMARY CARE | Facility: CLINIC | Age: 69
End: 2024-04-15
Payer: MEDICARE

## 2024-04-15 VITALS
BODY MASS INDEX: 28.17 KG/M2 | DIASTOLIC BLOOD PRESSURE: 70 MMHG | HEART RATE: 68 BPM | WEIGHT: 165 LBS | SYSTOLIC BLOOD PRESSURE: 130 MMHG | TEMPERATURE: 98 F | RESPIRATION RATE: 16 BRPM | HEIGHT: 64 IN

## 2024-04-15 DIAGNOSIS — B35.4 TINEA CORPORIS: ICD-10-CM

## 2024-04-15 DIAGNOSIS — I10 BENIGN ESSENTIAL HTN: Primary | ICD-10-CM

## 2024-04-15 DIAGNOSIS — E55.9 VITAMIN D DEFICIENCY: ICD-10-CM

## 2024-04-15 DIAGNOSIS — E78.49 OTHER HYPERLIPIDEMIA: ICD-10-CM

## 2024-04-15 DIAGNOSIS — E11.65 TYPE 2 DIABETES MELLITUS WITH HYPERGLYCEMIA, WITHOUT LONG-TERM CURRENT USE OF INSULIN (MULTI): ICD-10-CM

## 2024-04-15 PROCEDURE — 3075F SYST BP GE 130 - 139MM HG: CPT | Performed by: INTERNAL MEDICINE

## 2024-04-15 PROCEDURE — 99214 OFFICE O/P EST MOD 30 MIN: CPT | Performed by: INTERNAL MEDICINE

## 2024-04-15 PROCEDURE — 3078F DIAST BP <80 MM HG: CPT | Performed by: INTERNAL MEDICINE

## 2024-04-15 PROCEDURE — 1159F MED LIST DOCD IN RCRD: CPT | Performed by: INTERNAL MEDICINE

## 2024-04-15 RX ORDER — NYSTATIN 100000 [USP'U]/G
1 POWDER TOPICAL 2 TIMES DAILY
Qty: 60 G | Refills: 1 | Status: SHIPPED | OUTPATIENT
Start: 2024-04-15 | End: 2025-04-15

## 2024-04-15 ASSESSMENT — ENCOUNTER SYMPTOMS
SPEECH DIFFICULTY: 0
DYSURIA: 0
POLYPHAGIA: 0
VOICE CHANGE: 0
RHINORRHEA: 0
MUSCULOSKELETAL NEGATIVE: 1
CARDIOVASCULAR NEGATIVE: 1
NAUSEA: 0
JOINT SWELLING: 0
BACK PAIN: 0
WHEEZING: 0
CONFUSION: 0
DYSPHORIC MOOD: 0
DIARRHEA: 0
COUGH: 0
FLANK PAIN: 0
PALPITATIONS: 0
ENDOCRINE NEGATIVE: 1
CONSTIPATION: 0
PSYCHIATRIC NEGATIVE: 1
HYPERACTIVE: 0
SEIZURES: 0
NEUROLOGICAL NEGATIVE: 1
ACTIVITY CHANGE: 0
WEAKNESS: 0
EYE DISCHARGE: 0
POLYDIPSIA: 0
TREMORS: 0
SHORTNESS OF BREATH: 0
HEMATOLOGIC/LYMPHATIC NEGATIVE: 1
ROS SKIN COMMENTS: SEE HPI
SINUS PRESSURE: 0
DIFFICULTY URINATING: 0
SLEEP DISTURBANCE: 0
BRUISES/BLEEDS EASILY: 0
DIAPHORESIS: 0
PHOTOPHOBIA: 0
CONSTITUTIONAL NEGATIVE: 1
APNEA: 0
RESPIRATORY NEGATIVE: 1
CHILLS: 0
RECTAL PAIN: 0
FREQUENCY: 0
HEADACHES: 0
FEVER: 0
EYES NEGATIVE: 1
UNEXPECTED WEIGHT CHANGE: 0
CHEST TIGHTNESS: 0
MYALGIAS: 0
APPETITE CHANGE: 0
NERVOUS/ANXIOUS: 0
BLOOD IN STOOL: 0
ABDOMINAL PAIN: 0
GASTROINTESTINAL NEGATIVE: 1
TROUBLE SWALLOWING: 0
AGITATION: 0
NECK STIFFNESS: 0
ANAL BLEEDING: 0
DIZZINESS: 0
LIGHT-HEADEDNESS: 0
ABDOMINAL DISTENTION: 0
STRIDOR: 0
NECK PAIN: 0
SORE THROAT: 0
HEMATURIA: 0
NUMBNESS: 0
FATIGUE: 0

## 2024-04-15 NOTE — PROGRESS NOTES
Marsha Tapia is a 68 y.o. female   Patient with a past medical history of breast CA s/p mastectomy, chemo and radiation, DM, HTN, HLD, IgA deficiency, chronic diarrhea, Elevated coronary calcium score of 208, Osteoarthritis, severe cervical central canal stenosis with cord compression and evidence of cord edema at C3-4 and C5-6,s/p C3-C6 laminoplasty on 7/23/21, Hearing loss, Colonoscopy due in 2031, Mammogram (March)     Had a cyst in breast  Removed and treated  Seeing a dermatologist  Negative mammogram    Does not check sugars or take her meds      No chest pain/  SOB/ dizziness  BM OK  Energy level ok  Appetite OK             Review of Systems     Constitutional: no fever, no chills, not feeling poorly, not feeling tired and no recent weight gain, no recent weight loss.   ENT: no earache, no hearing loss, no nosebleeds, no nasal discharge, no sore throat and no hoarseness.   Cardiovascular: the heart rate was not slow, the heart rate was not fast, no chest pain, no palpitations, no intermittent leg claudication and no lower extremity edema.   Respiratory: no cough, wheezing or shortness of breath at rest or exertion  Gastrointestinal: no abdominal pain, no constipation, no melena, no nausea, no diarrhea, no vomiting and no blood in stools.   Musculoskeletal: no arthralgias, no myalgias, no back pain, no joint swelling, no joint stiffness, no limb pain and no limb swelling.   Integumentary: Rash underneath the breast  Neurological: no headache, no confusion, no numbness, no dizziness, no tingling and no fainting.   All other systems have been reviewed and are negative for complaint.       Vitals:    04/15/24 1010   BP: 130/70   Pulse: 68   Resp: 16   Temp: 36.7 °C (98 °F)        Physical Exam     Constitutional   General appearance: Alert and in no acute distress.     Pulmonary   Respiratory assessment: No respiratory distress, normal respiratory rhythm and effort.    Auscultation of Lungs: Clear bilateral  breath sounds.   Cardiovascular   Auscultation of heart: Apical pulse normal, heart rate and rhythm normal, normal S1 and S2, no murmurs and no pericardial rub.    Exam for edema: No peripheral edema.   Abdomen   Abdominal Exam: No bruits, normal bowel sounds, soft, non-tender, no abdominal mass palpated.    Liver and Spleen exam: No hepato-splenomegaly.   Musculoskeletal   Examination of gait: Normal.    Inspection of digits and nails: No clubbing or cyanosis of the fingernails.    Inspection/palpation of joints, bones and muscles: No joint swelling. Normal movement of all extremities.   Skin   Skin inspection: Erythema rash underneath breasts  Neurologic   Cranial nerves: Nerves 2-12 were intact, no focal neuro defects.     Assessment/Plan          Patient with a past medical history of breast CA s/p mastectomy, chemo and radiation, DM, HTN, HLD, IgA deficiency, chronic diarrhea, Elevated coronary calcium score of 208, Osteoarthritis, severe cervical central canal stenosis with cord compression and evidence of cord edema at C3-4 and C5-6,s/p C3-C6 laminoplasty on 7/23/21, Hearing loss, Colonoscopy due in 2031, MAmmogram (March)    #Tinea corporis  Mostly wet  Will apply and prescribed nystatin powder twice a day    #Abscess under right axilla  Currently on antibiotics  Still drains  Will follow with her specialist    #Hypertension  Stable  Continue current medications    #Diabetes mellitus  Not taking any medications  Check A1c and blood work    #Dyslipidemia with elevated calcium scores  Check lipid panels    Complete blood work  Follow-up in 1 month

## 2024-04-16 ENCOUNTER — LAB (OUTPATIENT)
Dept: LAB | Facility: LAB | Age: 69
End: 2024-04-16
Payer: MEDICARE

## 2024-04-16 DIAGNOSIS — E11.65 TYPE 2 DIABETES MELLITUS WITH HYPERGLYCEMIA, WITHOUT LONG-TERM CURRENT USE OF INSULIN (MULTI): ICD-10-CM

## 2024-04-16 DIAGNOSIS — E55.9 VITAMIN D DEFICIENCY: ICD-10-CM

## 2024-04-16 DIAGNOSIS — I10 BENIGN ESSENTIAL HTN: ICD-10-CM

## 2024-04-16 DIAGNOSIS — E78.49 OTHER HYPERLIPIDEMIA: ICD-10-CM

## 2024-04-16 LAB
25(OH)D3 SERPL-MCNC: 8 NG/ML (ref 30–100)
ALBUMIN SERPL BCP-MCNC: 3.8 G/DL (ref 3.4–5)
ALP SERPL-CCNC: 105 U/L (ref 33–136)
ALT SERPL W P-5'-P-CCNC: 8 U/L (ref 7–45)
ANION GAP SERPL CALC-SCNC: 13 MMOL/L (ref 10–20)
AST SERPL W P-5'-P-CCNC: 14 U/L (ref 9–39)
BILIRUB SERPL-MCNC: 0.4 MG/DL (ref 0–1.2)
BUN SERPL-MCNC: 28 MG/DL (ref 6–23)
CALCIUM SERPL-MCNC: 8.8 MG/DL (ref 8.6–10.6)
CHLORIDE SERPL-SCNC: 103 MMOL/L (ref 98–107)
CHOLEST SERPL-MCNC: 220 MG/DL (ref 0–199)
CHOLESTEROL/HDL RATIO: 4.3
CO2 SERPL-SCNC: 24 MMOL/L (ref 21–32)
CREAT SERPL-MCNC: 0.87 MG/DL (ref 0.5–1.05)
EGFRCR SERPLBLD CKD-EPI 2021: 73 ML/MIN/1.73M*2
ERYTHROCYTE [DISTWIDTH] IN BLOOD BY AUTOMATED COUNT: 17.2 % (ref 11.5–14.5)
EST. AVERAGE GLUCOSE BLD GHB EST-MCNC: 154 MG/DL
GLUCOSE SERPL-MCNC: 128 MG/DL (ref 74–99)
HBA1C MFR BLD: 7 %
HCT VFR BLD AUTO: 35 % (ref 36–46)
HDLC SERPL-MCNC: 50.7 MG/DL
HGB BLD-MCNC: 10.3 G/DL (ref 12–16)
LDLC SERPL CALC-MCNC: 156 MG/DL
MCH RBC QN AUTO: 24.5 PG (ref 26–34)
MCHC RBC AUTO-ENTMCNC: 29.4 G/DL (ref 32–36)
MCV RBC AUTO: 83 FL (ref 80–100)
NON HDL CHOLESTEROL: 169 MG/DL (ref 0–149)
NRBC BLD-RTO: 0 /100 WBCS (ref 0–0)
PLATELET # BLD AUTO: 256 X10*3/UL (ref 150–450)
POTASSIUM SERPL-SCNC: 4.2 MMOL/L (ref 3.5–5.3)
PROT SERPL-MCNC: 7.4 G/DL (ref 6.4–8.2)
RBC # BLD AUTO: 4.21 X10*6/UL (ref 4–5.2)
SODIUM SERPL-SCNC: 136 MMOL/L (ref 136–145)
TRIGL SERPL-MCNC: 69 MG/DL (ref 0–149)
TSH SERPL-ACNC: 2.83 MIU/L (ref 0.44–3.98)
VLDL: 14 MG/DL (ref 0–40)
WBC # BLD AUTO: 7.1 X10*3/UL (ref 4.4–11.3)

## 2024-04-16 PROCEDURE — 83036 HEMOGLOBIN GLYCOSYLATED A1C: CPT

## 2024-04-16 PROCEDURE — 36415 COLL VENOUS BLD VENIPUNCTURE: CPT

## 2024-04-16 PROCEDURE — 85027 COMPLETE CBC AUTOMATED: CPT

## 2024-04-16 PROCEDURE — 80061 LIPID PANEL: CPT

## 2024-04-16 PROCEDURE — 80053 COMPREHEN METABOLIC PANEL: CPT

## 2024-04-16 PROCEDURE — 84443 ASSAY THYROID STIM HORMONE: CPT

## 2024-04-16 PROCEDURE — 82306 VITAMIN D 25 HYDROXY: CPT

## 2024-04-18 NOTE — RESULT ENCOUNTER NOTE
Results reviewed.  Follow-up visit in a month  We will discuss better control of diabetes  Will need to make adjustments to cholesterol medications  And vitamin D supplementation

## 2024-07-01 ENCOUNTER — APPOINTMENT (OUTPATIENT)
Dept: PRIMARY CARE | Facility: CLINIC | Age: 69
End: 2024-07-01
Payer: MEDICARE

## 2024-07-08 ENCOUNTER — APPOINTMENT (OUTPATIENT)
Dept: RADIOLOGY | Facility: HOSPITAL | Age: 69
End: 2024-07-08
Payer: MEDICARE

## 2024-07-08 ENCOUNTER — HOSPITAL ENCOUNTER (EMERGENCY)
Facility: HOSPITAL | Age: 69
Discharge: HOME | End: 2024-07-09
Attending: EMERGENCY MEDICINE
Payer: MEDICARE

## 2024-07-08 VITALS
OXYGEN SATURATION: 99 % | RESPIRATION RATE: 14 BRPM | BODY MASS INDEX: 30.11 KG/M2 | HEIGHT: 64 IN | HEART RATE: 76 BPM | WEIGHT: 176.37 LBS | TEMPERATURE: 97.9 F | SYSTOLIC BLOOD PRESSURE: 216 MMHG | DIASTOLIC BLOOD PRESSURE: 77 MMHG

## 2024-07-08 DIAGNOSIS — S09.90XA CLOSED HEAD INJURY, INITIAL ENCOUNTER: ICD-10-CM

## 2024-07-08 DIAGNOSIS — S01.81XA LACERATION OF FOREHEAD, INITIAL ENCOUNTER: ICD-10-CM
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
## 2024-07-08 PROCEDURE — 70450 CT HEAD/BRAIN W/O DYE: CPT | Performed by: STUDENT IN AN ORGANIZED HEALTH CARE EDUCATION/TRAINING PROGRAM

## 2024-07-08 PROCEDURE — 99285 EMERGENCY DEPT VISIT HI MDM: CPT | Mod: 25

## 2024-07-08 PROCEDURE — 2500000004 HC RX 250 GENERAL PHARMACY W/ HCPCS (ALT 636 FOR OP/ED): Performed by: EMERGENCY MEDICINE

## 2024-07-08 PROCEDURE — 70450 CT HEAD/BRAIN W/O DYE: CPT

## 2024-07-08 PROCEDURE — 36415 COLL VENOUS BLD VENIPUNCTURE: CPT | Performed by: EMERGENCY MEDICINE

## 2024-07-08 PROCEDURE — 12001 RPR S/N/AX/GEN/TRNK 2.5CM/<: CPT

## 2024-07-08 PROCEDURE — 93005 ELECTROCARDIOGRAM TRACING: CPT

## 2024-07-08 RX ORDER — ACETAMINOPHEN 325 MG/1
975 TABLET ORAL ONCE
Status: COMPLETED | OUTPATIENT
Start: 2024-07-08 | End: 2024-07-08

## 2024-07-08 ASSESSMENT — PAIN DESCRIPTION - PAIN TYPE: TYPE: ACUTE PAIN

## 2024-07-08 ASSESSMENT — PAIN DESCRIPTION - LOCATION
LOCATION: HEAD
LOCATION: HEAD
LOCATION_2: HIP

## 2024-07-08 ASSESSMENT — PAIN DESCRIPTION - ORIENTATION: ORIENTATION_2: RIGHT

## 2024-07-08 ASSESSMENT — PAIN SCALES - GENERAL
PAINLEVEL_OUTOF10: 8
PAINLEVEL_OUTOF10: 8
PAINLEVEL_OUTOF10: 5 - MODERATE PAIN

## 2024-07-08 ASSESSMENT — PAIN - FUNCTIONAL ASSESSMENT: PAIN_FUNCTIONAL_ASSESSMENT: 0-10

## 2024-07-08 NOTE — LETTER
July 9, 2024    Patient: Marsha Tapia   YOB: 1955   Date of Visit: 7/8/2024       To Whom It May Concern:    Marsha Tapia was seen and treated in our emergency department on 7/8/2024. She may return to work on 7/10/2024.     If you have any questions or concerns, please don't hesitate to call.         Dr. Blake Hanna MD    CC: No Recipients

## 2024-07-09 ENCOUNTER — APPOINTMENT (OUTPATIENT)
Dept: CARDIOLOGY | Facility: HOSPITAL | Age: 69
End: 2024-07-09
Payer: MEDICARE

## 2024-07-09 LAB
ALBUMIN SERPL BCP-MCNC: 3.3 G/DL (ref 3.4–5)
ALP SERPL-CCNC: 89 U/L (ref 33–136)
ALT SERPL W P-5'-P-CCNC: 6 U/L (ref 7–45)
ANION GAP SERPL CALC-SCNC: 11 MMOL/L (ref 10–20)
AST SERPL W P-5'-P-CCNC: 10 U/L (ref 9–39)
BASOPHILS # BLD AUTO: 0.07 X10*3/UL (ref 0–0.1)
BASOPHILS NFR BLD AUTO: 0.8 %
BILIRUB SERPL-MCNC: 0.4 MG/DL (ref 0–1.2)
BUN SERPL-MCNC: 12 MG/DL (ref 6–23)
CALCIUM SERPL-MCNC: 7.4 MG/DL (ref 8.6–10.3)
CARDIAC TROPONIN I PNL SERPL HS: 7 NG/L (ref 0–13)
CHLORIDE SERPL-SCNC: 105 MMOL/L (ref 98–107)
CO2 SERPL-SCNC: 24 MMOL/L (ref 21–32)
CREAT SERPL-MCNC: 0.59 MG/DL (ref 0.5–1.05)
EGFRCR SERPLBLD CKD-EPI 2021: >90 ML/MIN/1.73M*2
EOSINOPHIL # BLD AUTO: 0.27 X10*3/UL (ref 0–0.7)
EOSINOPHIL NFR BLD AUTO: 3.2 %
ERYTHROCYTE [DISTWIDTH] IN BLOOD BY AUTOMATED COUNT: 16.3 % (ref 11.5–14.5)
GLUCOSE SERPL-MCNC: 113 MG/DL (ref 74–99)
HCT VFR BLD AUTO: 28.5 % (ref 36–46)
HGB BLD-MCNC: 8.8 G/DL (ref 12–16)
IMM GRANULOCYTES # BLD AUTO: 0.03 X10*3/UL (ref 0–0.7)
IMM GRANULOCYTES NFR BLD AUTO: 0.4 % (ref 0–0.9)
LYMPHOCYTES # BLD AUTO: 1.47 X10*3/UL (ref 1.2–4.8)
LYMPHOCYTES NFR BLD AUTO: 17.3 %
MCH RBC QN AUTO: 24.3 PG (ref 26–34)
MCHC RBC AUTO-ENTMCNC: 30.9 G/DL (ref 32–36)
MCV RBC AUTO: 79 FL (ref 80–100)
MONOCYTES # BLD AUTO: 0.89 X10*3/UL (ref 0.1–1)
MONOCYTES NFR BLD AUTO: 10.5 %
NEUTROPHILS # BLD AUTO: 5.78 X10*3/UL (ref 1.2–7.7)
NEUTROPHILS NFR BLD AUTO: 67.8 %
NRBC BLD-RTO: 0 /100 WBCS (ref 0–0)
PLATELET # BLD AUTO: 275 X10*3/UL (ref 150–450)
POTASSIUM SERPL-SCNC: 3 MMOL/L (ref 3.5–5.3)
PROT SERPL-MCNC: 6.4 G/DL (ref 6.4–8.2)
RBC # BLD AUTO: 3.62 X10*6/UL (ref 4–5.2)
SODIUM SERPL-SCNC: 137 MMOL/L (ref 136–145)
WBC # BLD AUTO: 8.5 X10*3/UL (ref 4.4–11.3)

## 2024-07-09 PROCEDURE — 80053 COMPREHEN METABOLIC PANEL: CPT | Performed by: EMERGENCY MEDICINE

## 2024-07-09 PROCEDURE — 85025 COMPLETE CBC W/AUTO DIFF WBC: CPT | Performed by: EMERGENCY MEDICINE

## 2024-07-09 PROCEDURE — 84484 ASSAY OF TROPONIN QUANT: CPT | Performed by: EMERGENCY MEDICINE

## 2024-07-09 PROCEDURE — 36415 COLL VENOUS BLD VENIPUNCTURE: CPT | Performed by: EMERGENCY MEDICINE

## 2024-07-09 RX ORDER — LIDOCAINE HYDROCHLORIDE AND EPINEPHRINE 10; 10 MG/ML; UG/ML
10 INJECTION, SOLUTION INFILTRATION; PERINEURAL ONCE
Status: DISCONTINUED | OUTPATIENT
Start: 2024-07-09 | End: 2024-07-09 | Stop reason: HOSPADM

## 2024-07-09 NOTE — ED NOTES
2 sutures placed by Dr. Hanna. 2x2 gauze applied, foam tape applied. D.c. papers given. Work note given, back on 7/10. Follow up with PCP or urgent care in 5 days for suture removal. Advised on tylenol for pain. Advised on icing. Advised on oral water hydration. After laceration heals up, use sunscreen for the 1st year over the site to help minimize scarring and/or wide brim hat when outside. Return back to ED immediately should symptoms change/worsen in any way. Pt home with family.      Neo Daigle RN  07/09/24 0112

## 2024-07-09 NOTE — ED PROVIDER NOTES
HPI   Chief Complaint   Patient presents with    Head Laceration    Fall       HPI  Patient presents with a fall today.  She states that she slipped at her place of work and hit her head on a desk or doorway.  And then she fell to her right side.  She denies injuries of her extremities.  She has some soreness of the right hip but is able to bend it and bear weight without difficulty.  She states that she may have been knocked out for unknown amount of time.  She was ambulatory after this.  Has no complaint besides mild headache.  She has chronic shoulder problems and takes ibuprofen at home for these.  Takes no blood thinners.                  Pao Coma Scale Score: 15                     Patient History   Past Medical History:   Diagnosis Date    Breast cancer (Multi)     Hx antineoplastic chemo     Localized enlarged lymph nodes 06/22/2021    Axillary adenopathy    Personal history of irradiation     Personal history of malignant neoplasm of breast     History of malignant neoplasm of left breast     Past Surgical History:   Procedure Laterality Date    BREAST LUMPECTOMY      MR HEAD ANGIO WO IV CONTRAST  07/20/2021    MR HEAD ANGIO WO IV CONTRAST 7/20/2021 [facility]    MR NECK ANGIO WO IV CONTRAST  07/20/2021    MR NECK ANGIO WO IV CONTRAST 7/20/2021 [facility]    OTHER SURGICAL HISTORY  10/12/2020    Shoulder replacement    OTHER SURGICAL HISTORY  10/12/2020    Bariatric surgery    OTHER SURGICAL HISTORY  10/12/2020    Cataract surgery    OTHER SURGICAL HISTORY  10/12/2020    Hysterectomy    OTHER SURGICAL HISTORY  10/28/2022    Lumpectomy    OTHER SURGICAL HISTORY  09/30/2021    Cyst excision     No family history on file.  Social History     Tobacco Use    Smoking status: Never    Smokeless tobacco: Not on file   Substance Use Topics    Alcohol use: Not on file    Drug use: Not on file       Physical Exam   ED Triage Vitals [07/08/24 2154]   Temperature Heart Rate Respirations BP   36.6  °C (97.9 °F) 76 14 (!) 216/77      Pulse Ox Temp Source Heart Rate Source Patient Position   99 % Oral -- --      BP Location FiO2 (%)     -- --       Physical Exam  Vitals and nursing note reviewed.   Constitutional:       General: She is not in acute distress.     Appearance: She is well-developed.   HENT:      Head: Normocephalic.      Comments: 1 cm left forehead laceration overlying a 5 cm contusion  Eyes:      Conjunctiva/sclera: Conjunctivae normal.   Cardiovascular:      Rate and Rhythm: Normal rate and regular rhythm.      Heart sounds: No murmur heard.  Pulmonary:      Effort: Pulmonary effort is normal. No respiratory distress.      Breath sounds: Normal breath sounds.   Abdominal:      Palpations: Abdomen is soft.      Tenderness: There is no abdominal tenderness.   Musculoskeletal:         General: No swelling.      Cervical back: Neck supple.   Skin:     General: Skin is warm and dry.      Capillary Refill: Capillary refill takes less than 2 seconds.   Neurological:      Mental Status: She is alert.   Psychiatric:         Mood and Affect: Mood normal.         ED Course & MDM   Diagnoses as of 07/09/24 0108   Fall, initial encounter   Closed head injury, initial encounter   Laceration of forehead, initial encounter       Medical Decision Making      Procedure  Laceration Repair    Performed by: Blake Hanna MD  Authorized by: Blake Hanna MD    Consent:     Consent obtained:  Verbal    Consent given by:  Patient    Risks discussed:  Infection    Alternatives discussed:  No treatment  Universal protocol:     Patient identity confirmed:  Verbally with patient  Anesthesia:     Anesthesia method:  None  Laceration details:     Location:  Scalp    Scalp location:  Frontal  Pre-procedure details:     Preparation:  Patient was prepped and draped in usual sterile fashion  Exploration:     Limited defect created (wound extended): no      Contaminated: no    Treatment:     Area cleansed with:  Soap  and water and saline    Amount of cleaning:  Standard    Irrigation solution:  Sterile saline    Irrigation method:  Pressure wash    Visualized foreign bodies/material removed: no      Debridement:  None    Undermining:  None  Skin repair:     Repair method:  Sutures    Suture size:  6-0    Suture material:  Nylon    Suture technique:  Simple interrupted    Number of sutures:  2  Approximation:     Approximation:  Close  Repair type:     Repair type:  Simple  Post-procedure details:     Dressing:  Non-adherent dressing    Procedure completion:  Tolerated     EKG interpreted by myself.  Normal sinus rhythm at a rate of 68 bpm.  Normal intervals.  Normal axis.  No signs of acute ischemia.      Blake Hanna MD  07/09/24 0110       Blake Hanna MD  08/17/24 0636

## 2024-07-09 NOTE — ED TRIAGE NOTES
To ED via [facility] from work, pt states she was walking started to fall, went to catch herself on the desk to prevent herself from falling, but fell into the desk hitting her left forehead on it. This caused her to have an LOC, pt did suffer a ~1cm laceration to her left forehead just above her eyebrow.     Pt c/o forehead pain and chronic right hip pain, states the hip pain is a little worse after the fall. She has been up to the bathroom and back with a steady gait, and states it did not affect the pain at all. No pelvis instability.     Pt is A&Ox4. Neuro intact.

## 2024-07-14 LAB
ATRIAL RATE: 68 BPM
P AXIS: 64 DEGREES
P OFFSET: 198 MS
P ONSET: 137 MS
PR INTERVAL: 170 MS
Q ONSET: 222 MS
QRS COUNT: 11 BEATS
QRS DURATION: 80 MS
QT INTERVAL: 482 MS
QTC CALCULATION(BAZETT): 512 MS
QTC FREDERICIA: 502 MS
R AXIS: 15 DEGREES
T AXIS: 152 DEGREES
T OFFSET: 463 MS
VENTRICULAR RATE: 68 BPM

## 2024-10-25 ENCOUNTER — APPOINTMENT (OUTPATIENT)
Dept: PRIMARY CARE | Facility: CLINIC | Age: 69
End: 2024-10-25
Payer: MEDICARE

## 2024-10-25 VITALS
WEIGHT: 167.6 LBS | RESPIRATION RATE: 16 BRPM | HEART RATE: 76 BPM | SYSTOLIC BLOOD PRESSURE: 140 MMHG | TEMPERATURE: 98.1 F | DIASTOLIC BLOOD PRESSURE: 80 MMHG | BODY MASS INDEX: 28.77 KG/M2

## 2024-10-25 DIAGNOSIS — R59.0 AXILLARY ADENOPATHY: ICD-10-CM

## 2024-10-25 DIAGNOSIS — E13.69 OTHER SPECIFIED DIABETES MELLITUS WITH OTHER SPECIFIED COMPLICATION, UNSPECIFIED WHETHER LONG TERM INSULIN USE (MULTI): ICD-10-CM

## 2024-10-25 DIAGNOSIS — I35.0 AORTIC VALVE STENOSIS, ETIOLOGY OF CARDIAC VALVE DISEASE UNSPECIFIED: ICD-10-CM

## 2024-10-25 DIAGNOSIS — E11.65 TYPE 2 DIABETES MELLITUS WITH HYPERGLYCEMIA, WITHOUT LONG-TERM CURRENT USE OF INSULIN: ICD-10-CM

## 2024-10-25 DIAGNOSIS — E78.49 OTHER HYPERLIPIDEMIA: ICD-10-CM

## 2024-10-25 DIAGNOSIS — I10 BENIGN ESSENTIAL HTN: Primary | ICD-10-CM

## 2024-10-25 DIAGNOSIS — R22.31 MASS OF RIGHT AXILLA: ICD-10-CM

## 2024-10-25 LAB
POC FINGERSTICK BLOOD GLUCOSE: 143 MG/DL (ref 70–100)
POC HEMOGLOBIN A1C: 7 % (ref 4.2–6.5)

## 2024-10-25 PROCEDURE — G2211 COMPLEX E/M VISIT ADD ON: HCPCS | Performed by: INTERNAL MEDICINE

## 2024-10-25 PROCEDURE — 3050F LDL-C >= 130 MG/DL: CPT | Performed by: INTERNAL MEDICINE

## 2024-10-25 PROCEDURE — 3051F HG A1C>EQUAL 7.0%<8.0%: CPT | Performed by: INTERNAL MEDICINE

## 2024-10-25 PROCEDURE — 3079F DIAST BP 80-89 MM HG: CPT | Performed by: INTERNAL MEDICINE

## 2024-10-25 PROCEDURE — 83036 HEMOGLOBIN GLYCOSYLATED A1C: CPT | Performed by: INTERNAL MEDICINE

## 2024-10-25 PROCEDURE — 1159F MED LIST DOCD IN RCRD: CPT | Performed by: INTERNAL MEDICINE

## 2024-10-25 PROCEDURE — 3077F SYST BP >= 140 MM HG: CPT | Performed by: INTERNAL MEDICINE

## 2024-10-25 PROCEDURE — 99214 OFFICE O/P EST MOD 30 MIN: CPT | Performed by: INTERNAL MEDICINE

## 2024-10-25 PROCEDURE — 82962 GLUCOSE BLOOD TEST: CPT | Performed by: INTERNAL MEDICINE

## 2024-10-25 NOTE — PROGRESS NOTES
Marsha Tapia is a 69 y.o. female   Patient with a past medical history of breast CA s/p mastectomy, chemo and radiation, DM, HTN, HLD, IgA deficiency, Anemia, chronic diarrhea, Elevated coronary calcium score of 208, aortic stenosis osteoarthritis, severe cervical central canal stenosis with cord compression and evidence of cord edema at C3-4 and C5-6,s/p C3-C6 laminoplasty on 7/23/21, Hearing loss, Colonoscopy due in 2031, MAmmogram (March)    The patient had a lump under her right axilla for which she has seen multiple rheumatologists  Did have an I&D which showed a keratin abscess  Continues to have recurrence with drainage it comes and goes    Denies any fevers chills  Feels fine  No chest pain/  SOB/ dizziness  BM OK  Energy level ok  Appetite OK             Review of Systems     Constitutional: no fever, no chills, not feeling poorly, not feeling tired and no recent weight gain, no recent weight loss.   ENT: no earache, no hearing loss, no nosebleeds, no nasal discharge, no sore throat and no hoarseness.   Cardiovascular: the heart rate was not slow, the heart rate was not fast, no chest pain, no palpitations, no intermittent leg claudication and no lower extremity edema.   Respiratory: no cough, wheezing or shortness of breath at rest or exertion  Gastrointestinal: no abdominal pain, no constipation, no melena, no nausea, no diarrhea, no vomiting and no blood in stools.   Musculoskeletal: no arthralgias, no myalgias, no back pain, no joint swelling, no joint stiffness, no limb pain and no limb swelling.   Integumentary: Skin lesion  Neurological: no headache, no confusion, no numbness, no dizziness, no tingling and no fainting.   All other systems have been reviewed and are negative for complaint.     Current Outpatient Medications   Medication Instructions    acetaminophen (TYLENOL) 650 mg, oral, Every 6 hours PRN    amLODIPine-benazepriL (Lotrel) 10-40 mg capsule 1 capsule, oral, Daily    atorvastatin  "(Lipitor) 20 mg tablet 1 tablet, oral, Daily    betamethasone dipropionate (Diprolene) 0.05 % ointment 1 Application    cephalexin (Keftab) 500 mg tablet 1 tablet, oral, 3 times daily    cetirizine (ZyrTEC) 10 mg tablet 1 tablet, oral, Daily    cyclobenzaprine (Flexeril) 10 mg tablet 1 tablet, oral, Every 8 hours PRN    docusate sodium (COLACE) 100 mg, oral, 2 times daily    doxycycline (Vibramycin) 100 mg capsule Take 1 capsule PO every day x10 days. Take with at least 8 ounces (large glass) of water, do not lie down for 30 minutes after.    fluticasone (Flonase) 50 mcg/actuation nasal spray 2 sprays, nasal, Daily    foam bandage (Allevyn) 2 X 2 \" bandage Apply to right axilla as directed    hydroCHLOROthiazide (MICROZIDE) 12.5 mg, oral, Daily    ibuprofen 600 mg tablet 1 tablet, oral, 3 times daily    lidocaine (Lidoderm) 5 % patch Apply topically to affected area once a day, to either side of surgical incision      metFORMIN (Glucophage) 500 mg tablet 1 tablet, oral, 2 times daily (morning and late afternoon)    mupirocin (Bactroban) 2 % ointment Apply a thin layer to the affected area three times a day x 1 week    nitroglycerin (NITROSTAT) 0.4 mg, sublingual, Every 5 min PRN    nystatin (Mycostatin) 100,000 unit/gram powder 1 Application, Topical, 2 times daily    predniSONE (DELTASONE) 20 mg    triamcinolone (Kenalog) 0.1 % cream 1 Application         Vitals:    10/25/24 1159   BP: 140/80   Pulse: 76   Resp: 16   Temp: 36.7 °C (98.1 °F)        Physical Exam     Constitutional   General appearance: Alert and in no acute distress.     Pulmonary   Respiratory assessment: No respiratory distress, normal respiratory rhythm and effort.    Auscultation of Lungs: Clear bilateral breath sounds.   Cardiovascular   Auscultation of heart: Apical pulse normal, heart rate and rhythm normal, normal S1 and S2, III/ VI SHARMAINE, and no pericardial rub.    Exam for edema: No peripheral edema.   Abdomen   Abdominal Exam: No bruits, " normal bowel sounds, soft, non-tender, no abdominal mass palpated.    Liver and Spleen exam: No hepato-splenomegaly.   Musculoskeletal   Examination of gait: Normal.    Inspection of digits and nails: No clubbing or cyanosis of the fingernails.    Inspection/palpation of joints, bones and muscles: No joint swelling. Normal movement of all extremities.   Skin   Skin inspection: Normal skin color and pigmentation, normal skin turgor and no visible rash.   Neurologic   Cranial nerves: Nerves 2-12 were intact, no focal neuro defects.    Assessment/Plan          Patient with a past medical history of breast CA s/p mastectomy, chemo and radiation, DM, HTN, HLD, IgA deficiency Anemia,, chronic diarrhea, Elevated coronary calcium score of 208, Osteoarthritis, severe cervical central canal stenosis with cord compression and evidence of cord edema at C3-4 and C5-6,s/p C3-C6 laminoplasty on 7/23/21, Hearing loss, Colonoscopy due in 2031, MAmmogram (March)     # Right axillary lesion  Had seen dermatology who diagnosed with a curtain abscess and granuloma  Seems to recur again and again  Will refer to general surgery to see if they can do an excision       #Hypertension  Stable  Continue current medications     #Diabetes mellitus  Not taking any medications  Check A1c and blood work     #Dyslipidemia with elevated calcium scores  Check lipid panels    #Aortic stenosis  Schedule echocardiogram to assess    Complete blood work

## 2024-10-29 ENCOUNTER — OFFICE VISIT (OUTPATIENT)
Dept: SURGERY | Facility: CLINIC | Age: 69
End: 2024-10-29
Payer: MEDICARE

## 2024-10-29 VITALS
HEART RATE: 73 BPM | OXYGEN SATURATION: 99 % | TEMPERATURE: 98.1 F | DIASTOLIC BLOOD PRESSURE: 72 MMHG | BODY MASS INDEX: 29.28 KG/M2 | WEIGHT: 170.6 LBS | SYSTOLIC BLOOD PRESSURE: 160 MMHG

## 2024-10-29 DIAGNOSIS — R22.31 MASS OF RIGHT AXILLA: ICD-10-CM

## 2024-10-29 DIAGNOSIS — M79.89 MASS OF SOFT TISSUE OF SHOULDER: ICD-10-CM

## 2024-10-29 PROCEDURE — 3077F SYST BP >= 140 MM HG: CPT | Performed by: STUDENT IN AN ORGANIZED HEALTH CARE EDUCATION/TRAINING PROGRAM

## 2024-10-29 PROCEDURE — 3078F DIAST BP <80 MM HG: CPT | Performed by: STUDENT IN AN ORGANIZED HEALTH CARE EDUCATION/TRAINING PROGRAM

## 2024-10-29 PROCEDURE — 1125F AMNT PAIN NOTED PAIN PRSNT: CPT | Performed by: STUDENT IN AN ORGANIZED HEALTH CARE EDUCATION/TRAINING PROGRAM

## 2024-10-29 PROCEDURE — 3050F LDL-C >= 130 MG/DL: CPT | Performed by: STUDENT IN AN ORGANIZED HEALTH CARE EDUCATION/TRAINING PROGRAM

## 2024-10-29 PROCEDURE — 99213 OFFICE O/P EST LOW 20 MIN: CPT | Performed by: STUDENT IN AN ORGANIZED HEALTH CARE EDUCATION/TRAINING PROGRAM

## 2024-10-29 PROCEDURE — 3051F HG A1C>EQUAL 7.0%<8.0%: CPT | Performed by: STUDENT IN AN ORGANIZED HEALTH CARE EDUCATION/TRAINING PROGRAM

## 2024-10-29 PROCEDURE — 1159F MED LIST DOCD IN RCRD: CPT | Performed by: STUDENT IN AN ORGANIZED HEALTH CARE EDUCATION/TRAINING PROGRAM

## 2024-10-29 ASSESSMENT — PAIN SCALES - GENERAL: PAINLEVEL_OUTOF10: 5

## 2024-11-01 ENCOUNTER — HOSPITAL ENCOUNTER (OUTPATIENT)
Dept: RADIOLOGY | Facility: HOSPITAL | Age: 69
Discharge: HOME | End: 2024-11-01
Payer: MEDICARE

## 2024-11-01 DIAGNOSIS — M79.89 MASS OF SOFT TISSUE OF SHOULDER: ICD-10-CM

## 2024-11-01 PROCEDURE — 76882 US LMTD JT/FCL EVL NVASC XTR: CPT

## 2024-11-03 DIAGNOSIS — R22.31 MASS OF RIGHT AXILLA: Primary | ICD-10-CM

## 2024-11-04 ENCOUNTER — HOSPITAL ENCOUNTER (OUTPATIENT)
Dept: CARDIOLOGY | Facility: HOSPITAL | Age: 69
Discharge: HOME | End: 2024-11-04
Payer: MEDICARE

## 2024-11-04 DIAGNOSIS — I35.0 AORTIC VALVE STENOSIS, ETIOLOGY OF CARDIAC VALVE DISEASE UNSPECIFIED: ICD-10-CM

## 2024-11-04 DIAGNOSIS — I35.9 NONRHEUMATIC AORTIC VALVE DISORDER, UNSPECIFIED: ICD-10-CM

## 2024-11-04 LAB
AORTIC VALVE MEAN GRADIENT: 18 MMHG
AORTIC VALVE PEAK VELOCITY: 2.94 M/S
AV PEAK GRADIENT: 34 MMHG
AVA (PEAK VEL): 1.09 CM2
AVA (VTI): 1.19 CM2
EJECTION FRACTION APICAL 4 CHAMBER: 74
EJECTION FRACTION: 68 %
LEFT ATRIUM VOLUME AREA LENGTH INDEX BSA: 54 ML/M2
LEFT VENTRICLE INTERNAL DIMENSION DIASTOLE: 4.69 CM (ref 3.5–6)
LEFT VENTRICULAR OUTFLOW TRACT DIAMETER: 2.04 CM
MITRAL VALVE E/A RATIO: 1.28
RIGHT VENTRICLE FREE WALL PEAK S': 12 CM/S
TRICUSPID ANNULAR PLANE SYSTOLIC EXCURSION: 2.7 CM

## 2024-11-04 PROCEDURE — 2500000004 HC RX 250 GENERAL PHARMACY W/ HCPCS (ALT 636 FOR OP/ED)

## 2024-11-04 PROCEDURE — 93306 TTE W/DOPPLER COMPLETE: CPT

## 2024-11-07 NOTE — RESULT ENCOUNTER NOTE
Cardiac consultation. Referring  Physician   Richie Martinez, [address] [employer] [place] [address]      Consultation (-PT C/O ON Faizan Dash ,GABO) and Office Visit        HPI   The patient is a 78year old female here today for an initial cardiovascular evaluation and I would like to reiterate pertinent historical and physical findings for our records. The patient reports that within last few months she has been having symptoms of exertional chest pressure associated with shortness of breath. She is physically active, exercises regularly and recently noticed that she has to stop activities because of above-mentioned symptoms and fatigue. No resting symptoms. She consulted with Dr. Donnalee Closs who recommended stress echocardiogram.  It was done in September 2020 and showed average functional capacity of 6 minutes with development of 3 mm of ST segment depressions and new basal and mid inferoseptal wall hypokinesis consistent with ischemia. The patient is now seeking an opinion regarding further testing for diagnosis and treatment, is eager to resume normal physical activities without limitations. The patient has  history of mild hypertension, on HCTZ since beginning of 2020. In addition, she has family history of CAD in numerous relatives but not at young ages. She has no history of dyslipidemia or diabetes. No smoking.   Except for mild asthma, she has no significant medical history     Patient Active Problem List   Diagnosis   â¢ Exertional shortness of breath   â¢ Osteoporosis without current pathological fracture   â¢ Essential hypertension   â¢ Encounter for screening mammogram for malignant neoplasm of breast   â¢ Screen for colon cancer   â¢ Mild asthma without complication   â¢ Dyslipidemia       Past Medical History:   Diagnosis Date   â¢ Asthma 2000   â¢ Asthma    â¢ Essential (primary) hypertension    â¢ Malignant neoplasm (CMS/HCC)    â¢ Osteoporosis      Past Surgical History:   Procedure Laterality Date   â¢ Results reviewed.  Echocardiogram shows moderate narrowing of the aortic valve  Will continue to do yearly echocardiograms to assess the valve  Once the aortic valve becomes more severe then it will need to be surgically addressed  Please call if you have any questions or concerns. "Joint replacement     â¢ Skin cancer excision Right      Family History   Problem Relation Age of Onset   â¢ Heart disease Mother    â¢ Coronary Artery Disease Mother    â¢ Heart disease Maternal Uncle      ALLERGIES:   Allergen Reactions   â¢ Penicillins ANAPHYLAXIS     Current Outpatient Medications   Medication Sig Dispense Refill   â¢ Probiotic Product (PROBIOTIC DAILY PO)      â¢ aspirin (ECOTRIN) 81 MG EC tablet Take 81 mg by mouth daily. â¢ Ascorbic Acid (Vitamin C) 500 MG Cap Take by mouth daily. â¢ Omega-3 Fatty Acids (Fish Oil) 645 MG Cap      â¢ hydrochlorothiazide (HYDRODIURIL) 25 MG tablet Take 1 tablet by mouth daily. 90 tablet 0   â¢ Cholecalciferol (Vitamin D-3) 125 mcg (5,000 units) tablet Take 1 tablet by mouth daily. 90 tablet 0   â¢ aspirin 325 MG EC tablet Take 1 tablet by mouth daily. 90 tablet 1   â¢ vitamin D, Cholecalciferol, 25 mcg (1,000 units) capsule Take 1 capsule by mouth daily. 180 capsule 3     No current facility-administered medications for this visit. Social History     Tobacco Use   â¢ Smoking status: Never Smoker   â¢ Smokeless tobacco: Never Used   Substance Use Topics   â¢ Alcohol use: Yes     Frequency: Never     Drinks per session: 1 or 2     Binge frequency: Weekly   â¢ Drug use: Never         Review of Systems   Constitutional: Negative. HENT: Negative. Eyes: Negative. Respiratory: Positive for chest tightness and shortness of breath. Cardiovascular: Negative. Gastrointestinal: Negative. Endocrine: Negative. Genitourinary: Negative. Musculoskeletal: Negative. Skin: Negative. Allergic/Immunologic: Negative. Neurological: Negative. Hematological: Negative. Psychiatric/Behavioral: Negative. All other systems reviewed and are negative.       Visit Vitals  /76 (BP Location: LUE - Left upper extremity, Patient Position: Standing, Cuff Size: Regular)   Pulse 76   Temp 98 Â°F (36.7 Â°C)   Resp 17   Ht 5' 5"" (1.651 m)   Wt 96.2 kg (212 lb) " SpO2 97%   BMI 35.28 kg/mÂ²     Physical Exam   Constitutional: She is oriented to person, place, and time. She appears well-developed and well-nourished. HENT:   Head: Normocephalic. Nose: Nose normal.   Mouth/Throat: Oropharynx is clear and moist.   Eyes: Pupils are equal, round, and reactive to light. Conjunctivae and EOM are normal.   Neck: Normal range of motion. Neck supple. Cardiovascular: Normal rate, regular rhythm, S1 normal, S2 normal, normal heart sounds and intact distal pulses. Pulses:       Carotid pulses are 2+ on the right side and 2+ on the left side. Radial pulses are 2+ on the right side and 2+ on the left side. Femoral pulses are 2+ on the right side and 2+ on the left side. Popliteal pulses are 2+ on the right side and 2+ on the left side. Dorsalis pedis pulses are 2+ on the right side and 2+ on the left side. Posterior tibial pulses are 2+ on the right side and 2+ on the left side. Pulmonary/Chest: Effort normal and breath sounds normal.   Abdominal: Soft. Bowel sounds are normal.   Musculoskeletal: Normal range of motion. Neurological: She is alert and oriented to person, place, and time. She has normal reflexes. Skin: Skin is warm and dry. Psychiatric: She has a normal mood and affect. Her behavior is normal. Judgment and thought content normal.   Nursing note and vitals reviewed. Laboratory Data  CHOLESTEROL (mg/dL)   Date Value   08/18/2020 191     HDL (mg/dL)   Date Value   08/18/2020 55     CHOL/HDL (no units)   Date Value   08/18/2020 3.5     TRIGLYCERIDE (mg/dL)   Date Value   08/18/2020 115     CALCULATED LDL (mg/dL)   Date Value   08/18/2020 113         Problem   Dyslipidemia   Exertional Shortness of Breath    Patient had a stress echocardiogram in September 2020 for symptoms of exertional chest pressure and dyspnea.   It was abnormal, she had 3 mm of ST segment depressions at peak exercise and new basal and inferior wall motion abnormalities.  -She is physically very active, wants to return to normal activities that are currently curtailed because of her symptoms.  -We will proceed with coronary angiography is a next step in her evaluation.   Further recommendations will depend on the results of the angiogram     Essential Hypertension       Essential hypertension  -on HCTZ since early 2020  -Hypertension is well controlled    Dyslipidemia  -Lipids are mildly abnormal.  We will start lipid-lowering therapy after coronary angiography if CAD is documented      Orders Placed This Encounter   â¢ Probiotic Product (PROBIOTIC DAILY PO)   â¢ aspirin (ECOTRIN) 81 MG EC tablet       Return Post angiogram.

## 2024-11-12 DIAGNOSIS — E11.65 TYPE 2 DIABETES MELLITUS WITH HYPERGLYCEMIA, WITHOUT LONG-TERM CURRENT USE OF INSULIN: ICD-10-CM

## 2024-11-12 DIAGNOSIS — I10 PRIMARY HYPERTENSION: ICD-10-CM

## 2024-11-12 RX ORDER — METFORMIN HYDROCHLORIDE 500 MG/1
500 TABLET ORAL
Qty: 120 TABLET | Refills: 1 | Status: SHIPPED | OUTPATIENT
Start: 2024-11-12

## 2024-11-12 RX ORDER — AMLODIPINE AND BENAZEPRIL HYDROCHLORIDE 10; 40 MG/1; MG/1
1 CAPSULE ORAL DAILY
Qty: 90 CAPSULE | Refills: 0 | Status: SHIPPED | OUTPATIENT
Start: 2024-11-12

## 2024-11-18 ENCOUNTER — HOSPITAL ENCOUNTER (OUTPATIENT)
Dept: RADIOLOGY | Facility: HOSPITAL | Age: 69
Discharge: HOME | End: 2024-11-18
Payer: MEDICARE

## 2024-11-18 DIAGNOSIS — R22.31 MASS OF RIGHT AXILLA: ICD-10-CM

## 2024-11-18 PROCEDURE — 73221 MRI JOINT UPR EXTREM W/O DYE: CPT | Mod: RT

## 2024-12-12 ENCOUNTER — APPOINTMENT (OUTPATIENT)
Dept: PRIMARY CARE | Facility: CLINIC | Age: 69
End: 2024-12-12
Payer: MEDICARE

## 2025-01-12 ENCOUNTER — APPOINTMENT (OUTPATIENT)
Dept: RADIOLOGY | Facility: HOSPITAL | Age: 70
End: 2025-01-12
Payer: MEDICARE

## 2025-01-12 ENCOUNTER — APPOINTMENT (OUTPATIENT)
Dept: CARDIOLOGY | Facility: HOSPITAL | Age: 70
End: 2025-01-12
Payer: MEDICARE

## 2025-01-12 ENCOUNTER — HOSPITAL ENCOUNTER (INPATIENT)
Facility: HOSPITAL | Age: 70
End: 2025-01-12
Attending: EMERGENCY MEDICINE | Admitting: SURGERY
Payer: MEDICARE

## 2025-01-12 DIAGNOSIS — I21.3 STEMI (ST ELEVATION MYOCARDIAL INFARCTION) (MULTI): ICD-10-CM

## 2025-01-12 DIAGNOSIS — I21.02 STEMI INVOLVING LEFT ANTERIOR DESCENDING CORONARY ARTERY (MULTI): ICD-10-CM

## 2025-01-12 DIAGNOSIS — I21.3 ST ELEVATION MYOCARDIAL INFARCTION (STEMI), UNSPECIFIED ARTERY (MULTI): Primary | ICD-10-CM

## 2025-01-12 PROBLEM — N95.0 POSTMENOPAUSAL BLEEDING: Status: ACTIVE | Noted: 2017-09-25

## 2025-01-12 PROBLEM — T85.898A ANASTOMOTIC ULCER S/P GASTRIC BYPASS: Status: ACTIVE | Noted: 2017-02-08

## 2025-01-12 PROBLEM — E44.0 MALNUTRITION OF MODERATE DEGREE (MULTI): Status: ACTIVE | Noted: 2017-03-11

## 2025-01-12 PROBLEM — Z85.3 HISTORY OF MALIGNANT NEOPLASM OF BREAST: Status: ACTIVE | Noted: 2025-01-12

## 2025-01-12 PROBLEM — D09.9: Status: ACTIVE | Noted: 2017-02-08

## 2025-01-12 PROBLEM — K28.9 ANASTOMOTIC ULCER S/P GASTRIC BYPASS: Status: ACTIVE | Noted: 2017-02-08

## 2025-01-12 PROBLEM — E53.8 B12 DEFICIENCY: Status: ACTIVE | Noted: 2017-06-26

## 2025-01-12 PROBLEM — Z86.0101 HX OF ADENOMATOUS COLONIC POLYPS: Status: ACTIVE | Noted: 2020-01-27

## 2025-01-12 PROBLEM — K21.00 GASTROESOPHAGEAL REFLUX DISEASE WITH ESOPHAGITIS: Status: ACTIVE | Noted: 2025-01-12

## 2025-01-12 PROBLEM — K92.2 GIB (GASTROINTESTINAL BLEEDING): Status: ACTIVE | Noted: 2017-02-13

## 2025-01-12 LAB
ACT BLD: 300 SEC (ref 83–199)
ACT BLD: NORMAL S
ALBUMIN SERPL BCP-MCNC: 3.6 G/DL (ref 3.4–5)
ALP SERPL-CCNC: 102 U/L (ref 33–136)
ALT SERPL W P-5'-P-CCNC: 8 U/L (ref 7–45)
AMPHETAMINES UR QL SCN: ABNORMAL
ANION GAP SERPL CALC-SCNC: 12 MMOL/L (ref 10–20)
APPEARANCE UR: CLEAR
APTT PPP: 32 SECONDS (ref 27–38)
AST SERPL W P-5'-P-CCNC: 13 U/L (ref 9–39)
BARBITURATES UR QL SCN: ABNORMAL
BASOPHILS # BLD AUTO: 0.05 X10*3/UL (ref 0–0.1)
BASOPHILS NFR BLD AUTO: 0.7 %
BENZODIAZ UR QL SCN: ABNORMAL
BILIRUB SERPL-MCNC: 0.4 MG/DL (ref 0–1.2)
BILIRUB UR STRIP.AUTO-MCNC: NEGATIVE MG/DL
BNP SERPL-MCNC: 217 PG/ML (ref 0–99)
BUN SERPL-MCNC: 11 MG/DL (ref 6–23)
BZE UR QL SCN: ABNORMAL
CALCIUM SERPL-MCNC: 8.2 MG/DL (ref 8.6–10.3)
CANNABINOIDS UR QL SCN: ABNORMAL
CARDIAC TROPONIN I PNL SERPL HS: 122 NG/L (ref 0–13)
CARDIAC TROPONIN I PNL SERPL HS: 135 NG/L (ref 0–13)
CARDIAC TROPONIN I PNL SERPL HS: ABNORMAL NG/L (ref 0–13)
CARDIAC TROPONIN I PNL SERPL HS: ABNORMAL NG/L (ref 0–13)
CHLORIDE SERPL-SCNC: 103 MMOL/L (ref 98–107)
CHOLEST SERPL-MCNC: 160 MG/DL (ref 0–199)
CHOLESTEROL/HDL RATIO: 3.8
CO2 SERPL-SCNC: 23 MMOL/L (ref 21–32)
COLOR UR: ABNORMAL
CREAT SERPL-MCNC: 0.7 MG/DL (ref 0.5–1.05)
EGFRCR SERPLBLD CKD-EPI 2021: >90 ML/MIN/1.73M*2
EOSINOPHIL # BLD AUTO: 0.28 X10*3/UL (ref 0–0.7)
EOSINOPHIL NFR BLD AUTO: 3.9 %
ERYTHROCYTE [DISTWIDTH] IN BLOOD BY AUTOMATED COUNT: 18.4 % (ref 11.5–14.5)
EST. AVERAGE GLUCOSE BLD GHB EST-MCNC: 163 MG/DL
FENTANYL+NORFENTANYL UR QL SCN: ABNORMAL
GLUCOSE SERPL-MCNC: 194 MG/DL (ref 74–99)
GLUCOSE UR STRIP.AUTO-MCNC: NORMAL MG/DL
HBA1C MFR BLD: 7.3 %
HCT VFR BLD AUTO: 33.2 % (ref 36–46)
HDLC SERPL-MCNC: 42.3 MG/DL
HGB BLD-MCNC: 9.7 G/DL (ref 12–16)
IMM GRANULOCYTES # BLD AUTO: 0.03 X10*3/UL (ref 0–0.7)
IMM GRANULOCYTES NFR BLD AUTO: 0.4 % (ref 0–0.9)
KETONES UR STRIP.AUTO-MCNC: NEGATIVE MG/DL
LDLC SERPL CALC-MCNC: 104 MG/DL
LEUKOCYTE ESTERASE UR QL STRIP.AUTO: NEGATIVE
LYMPHOCYTES # BLD AUTO: 1.45 X10*3/UL (ref 1.2–4.8)
LYMPHOCYTES NFR BLD AUTO: 20.5 %
MAGNESIUM SERPL-MCNC: 1.64 MG/DL (ref 1.6–2.4)
MCH RBC QN AUTO: 23.4 PG (ref 26–34)
MCHC RBC AUTO-ENTMCNC: 29.2 G/DL (ref 32–36)
MCV RBC AUTO: 80 FL (ref 80–100)
METHADONE UR QL SCN: ABNORMAL
MONOCYTES # BLD AUTO: 0.57 X10*3/UL (ref 0.1–1)
MONOCYTES NFR BLD AUTO: 8 %
NEUTROPHILS # BLD AUTO: 4.71 X10*3/UL (ref 1.2–7.7)
NEUTROPHILS NFR BLD AUTO: 66.5 %
NITRITE UR QL STRIP.AUTO: NEGATIVE
NON HDL CHOLESTEROL: 118 MG/DL (ref 0–149)
NRBC BLD-RTO: 0 /100 WBCS (ref 0–0)
OPIATES UR QL SCN: ABNORMAL
OXYCODONE+OXYMORPHONE UR QL SCN: ABNORMAL
PCP UR QL SCN: ABNORMAL
PH UR STRIP.AUTO: 5.5 [PH]
PHOSPHATE SERPL-MCNC: 3.4 MG/DL (ref 2.5–4.9)
PLATELET # BLD AUTO: 231 X10*3/UL (ref 150–450)
POTASSIUM SERPL-SCNC: 4.8 MMOL/L (ref 3.5–5.3)
PROT SERPL-MCNC: 6.7 G/DL (ref 6.4–8.2)
PROT UR STRIP.AUTO-MCNC: ABNORMAL MG/DL
RBC # BLD AUTO: 4.15 X10*6/UL (ref 4–5.2)
RBC # UR STRIP.AUTO: ABNORMAL /UL
RBC #/AREA URNS AUTO: >20 /HPF
SODIUM SERPL-SCNC: 133 MMOL/L (ref 136–145)
SP GR UR STRIP.AUTO: >1.05
SQUAMOUS #/AREA URNS AUTO: ABNORMAL /HPF
TRIGL SERPL-MCNC: 67 MG/DL (ref 0–149)
TSH SERPL-ACNC: 3.09 MIU/L (ref 0.44–3.98)
UROBILINOGEN UR STRIP.AUTO-MCNC: NORMAL MG/DL
VLDL: 13 MG/DL (ref 0–40)
WBC # BLD AUTO: 7.1 X10*3/UL (ref 4.4–11.3)
WBC #/AREA URNS AUTO: ABNORMAL /HPF

## 2025-01-12 PROCEDURE — 36415 COLL VENOUS BLD VENIPUNCTURE: CPT | Performed by: PHYSICIAN ASSISTANT

## 2025-01-12 PROCEDURE — 027034Z DILATION OF CORONARY ARTERY, ONE ARTERY WITH DRUG-ELUTING INTRALUMINAL DEVICE, PERCUTANEOUS APPROACH: ICD-10-PCS | Performed by: STUDENT IN AN ORGANIZED HEALTH CARE EDUCATION/TRAINING PROGRAM

## 2025-01-12 PROCEDURE — 99152 MOD SED SAME PHYS/QHP 5/>YRS: CPT | Performed by: INTERNAL MEDICINE

## 2025-01-12 PROCEDURE — 93005 ELECTROCARDIOGRAM TRACING: CPT

## 2025-01-12 PROCEDURE — 2500000001 HC RX 250 WO HCPCS SELF ADMINISTERED DRUGS (ALT 637 FOR MEDICARE OP)

## 2025-01-12 PROCEDURE — 80053 COMPREHEN METABOLIC PANEL: CPT | Performed by: PHYSICIAN ASSISTANT

## 2025-01-12 PROCEDURE — 92978 ENDOLUMINL IVUS OCT C 1ST: CPT | Performed by: INTERNAL MEDICINE

## 2025-01-12 PROCEDURE — 2500000004 HC RX 250 GENERAL PHARMACY W/ HCPCS (ALT 636 FOR OP/ED): Performed by: INTERNAL MEDICINE

## 2025-01-12 PROCEDURE — 2500000005 HC RX 250 GENERAL PHARMACY W/O HCPCS: Performed by: INTERNAL MEDICINE

## 2025-01-12 PROCEDURE — 83880 ASSAY OF NATRIURETIC PEPTIDE: CPT | Performed by: PHYSICIAN ASSISTANT

## 2025-01-12 PROCEDURE — 96374 THER/PROPH/DIAG INJ IV PUSH: CPT | Mod: 59

## 2025-01-12 PROCEDURE — 81001 URINALYSIS AUTO W/SCOPE: CPT | Performed by: PHYSICIAN ASSISTANT

## 2025-01-12 PROCEDURE — 96375 TX/PRO/DX INJ NEW DRUG ADDON: CPT | Mod: 59

## 2025-01-12 PROCEDURE — 80307 DRUG TEST PRSMV CHEM ANLYZR: CPT | Performed by: PHYSICIAN ASSISTANT

## 2025-01-12 PROCEDURE — 99153 MOD SED SAME PHYS/QHP EA: CPT | Performed by: INTERNAL MEDICINE

## 2025-01-12 PROCEDURE — 2500000002 HC RX 250 W HCPCS SELF ADMINISTERED DRUGS (ALT 637 FOR MEDICARE OP, ALT 636 FOR OP/ED): Performed by: PHYSICIAN ASSISTANT

## 2025-01-12 PROCEDURE — 2500000001 HC RX 250 WO HCPCS SELF ADMINISTERED DRUGS (ALT 637 FOR MEDICARE OP): Performed by: PHYSICIAN ASSISTANT

## 2025-01-12 PROCEDURE — B41G1ZZ FLUOROSCOPY OF LEFT LOWER EXTREMITY ARTERIES USING LOW OSMOLAR CONTRAST: ICD-10-PCS | Performed by: STUDENT IN AN ORGANIZED HEALTH CARE EDUCATION/TRAINING PROGRAM

## 2025-01-12 PROCEDURE — 84443 ASSAY THYROID STIM HORMONE: CPT | Performed by: NURSE PRACTITIONER

## 2025-01-12 PROCEDURE — 85730 THROMBOPLASTIN TIME PARTIAL: CPT | Performed by: EMERGENCY MEDICINE

## 2025-01-12 PROCEDURE — 2780000003 HC OR 278 NO HCPCS: Performed by: INTERNAL MEDICINE

## 2025-01-12 PROCEDURE — C1760 CLOSURE DEV, VASC: HCPCS | Performed by: INTERNAL MEDICINE

## 2025-01-12 PROCEDURE — 93010 ELECTROCARDIOGRAM REPORT: CPT | Performed by: INTERNAL MEDICINE

## 2025-01-12 PROCEDURE — 71275 CT ANGIOGRAPHY CHEST: CPT | Performed by: RADIOLOGY

## 2025-01-12 PROCEDURE — 99291 CRITICAL CARE FIRST HOUR: CPT | Performed by: EMERGENCY MEDICINE

## 2025-01-12 PROCEDURE — 71045 X-RAY EXAM CHEST 1 VIEW: CPT | Mod: FOREIGN READ | Performed by: RADIOLOGY

## 2025-01-12 PROCEDURE — 2500000002 HC RX 250 W HCPCS SELF ADMINISTERED DRUGS (ALT 637 FOR MEDICARE OP, ALT 636 FOR OP/ED): Performed by: NURSE PRACTITIONER

## 2025-01-12 PROCEDURE — 2020000001 HC ICU ROOM DAILY

## 2025-01-12 PROCEDURE — 2720000007 HC OR 272 NO HCPCS: Performed by: INTERNAL MEDICINE

## 2025-01-12 PROCEDURE — 71275 CT ANGIOGRAPHY CHEST: CPT

## 2025-01-12 PROCEDURE — B2111ZZ FLUOROSCOPY OF MULTIPLE CORONARY ARTERIES USING LOW OSMOLAR CONTRAST: ICD-10-PCS | Performed by: STUDENT IN AN ORGANIZED HEALTH CARE EDUCATION/TRAINING PROGRAM

## 2025-01-12 PROCEDURE — 99222 1ST HOSP IP/OBS MODERATE 55: CPT

## 2025-01-12 PROCEDURE — C1874 STENT, COATED/COV W/DEL SYS: HCPCS | Performed by: INTERNAL MEDICINE

## 2025-01-12 PROCEDURE — C1887 CATHETER, GUIDING: HCPCS | Performed by: INTERNAL MEDICINE

## 2025-01-12 PROCEDURE — 92978 ENDOLUMINL IVUS OCT C 1ST: CPT | Mod: LD | Performed by: INTERNAL MEDICINE

## 2025-01-12 PROCEDURE — 84484 ASSAY OF TROPONIN QUANT: CPT | Performed by: NURSE PRACTITIONER

## 2025-01-12 PROCEDURE — 2500000004 HC RX 250 GENERAL PHARMACY W/ HCPCS (ALT 636 FOR OP/ED): Performed by: NURSE PRACTITIONER

## 2025-01-12 PROCEDURE — 83735 ASSAY OF MAGNESIUM: CPT | Performed by: PHYSICIAN ASSISTANT

## 2025-01-12 PROCEDURE — 85347 COAGULATION TIME ACTIVATED: CPT

## 2025-01-12 PROCEDURE — 84484 ASSAY OF TROPONIN QUANT: CPT | Performed by: PHYSICIAN ASSISTANT

## 2025-01-12 PROCEDURE — 84100 ASSAY OF PHOSPHORUS: CPT | Performed by: NURSE PRACTITIONER

## 2025-01-12 PROCEDURE — C9606 PERC D-E COR REVASC W AMI S: HCPCS | Mod: LD | Performed by: INTERNAL MEDICINE

## 2025-01-12 PROCEDURE — 93458 L HRT ARTERY/VENTRICLE ANGIO: CPT | Performed by: INTERNAL MEDICINE

## 2025-01-12 PROCEDURE — 85025 COMPLETE CBC W/AUTO DIFF WBC: CPT | Performed by: PHYSICIAN ASSISTANT

## 2025-01-12 PROCEDURE — C1757 CATH, THROMBECTOMY/EMBOLECT: HCPCS | Performed by: INTERNAL MEDICINE

## 2025-01-12 PROCEDURE — 87632 RESP VIRUS 6-11 TARGETS: CPT | Performed by: NURSE PRACTITIONER

## 2025-01-12 PROCEDURE — 83036 HEMOGLOBIN GLYCOSYLATED A1C: CPT | Mod: AHULAB | Performed by: NURSE PRACTITIONER

## 2025-01-12 PROCEDURE — 2500000004 HC RX 250 GENERAL PHARMACY W/ HCPCS (ALT 636 FOR OP/ED): Performed by: PHYSICIAN ASSISTANT

## 2025-01-12 PROCEDURE — 2500000001 HC RX 250 WO HCPCS SELF ADMINISTERED DRUGS (ALT 637 FOR MEDICARE OP): Performed by: SURGERY

## 2025-01-12 PROCEDURE — 74174 CTA ABD&PLVS W/CONTRAST: CPT | Performed by: RADIOLOGY

## 2025-01-12 PROCEDURE — 2550000001 HC RX 255 CONTRASTS: Performed by: INTERNAL MEDICINE

## 2025-01-12 PROCEDURE — 99291 CRITICAL CARE FIRST HOUR: CPT | Performed by: NURSE PRACTITIONER

## 2025-01-12 PROCEDURE — 96373 THER/PROPH/DIAG INJ IA: CPT | Performed by: INTERNAL MEDICINE

## 2025-01-12 PROCEDURE — 94640 AIRWAY INHALATION TREATMENT: CPT

## 2025-01-12 PROCEDURE — 37799 UNLISTED PX VASCULAR SURGERY: CPT | Performed by: NURSE PRACTITIONER

## 2025-01-12 PROCEDURE — 2500000002 HC RX 250 W HCPCS SELF ADMINISTERED DRUGS (ALT 637 FOR MEDICARE OP, ALT 636 FOR OP/ED)

## 2025-01-12 PROCEDURE — 92941 PRQ TRLML REVSC TOT OCCL AMI: CPT | Performed by: INTERNAL MEDICINE

## 2025-01-12 PROCEDURE — 2550000001 HC RX 255 CONTRASTS: Performed by: SURGERY

## 2025-01-12 PROCEDURE — 2500000001 HC RX 250 WO HCPCS SELF ADMINISTERED DRUGS (ALT 637 FOR MEDICARE OP): Performed by: NURSE PRACTITIONER

## 2025-01-12 PROCEDURE — C1769 GUIDE WIRE: HCPCS | Performed by: INTERNAL MEDICINE

## 2025-01-12 PROCEDURE — 82947 ASSAY GLUCOSE BLOOD QUANT: CPT

## 2025-01-12 PROCEDURE — G0269 OCCLUSIVE DEVICE IN VEIN ART: HCPCS | Performed by: INTERNAL MEDICINE

## 2025-01-12 PROCEDURE — 71045 X-RAY EXAM CHEST 1 VIEW: CPT

## 2025-01-12 PROCEDURE — C1753 CATH, INTRAVAS ULTRASOUND: HCPCS | Performed by: INTERNAL MEDICINE

## 2025-01-12 PROCEDURE — C1894 INTRO/SHEATH, NON-LASER: HCPCS | Performed by: INTERNAL MEDICINE

## 2025-01-12 PROCEDURE — 4A023N7 MEASUREMENT OF CARDIAC SAMPLING AND PRESSURE, LEFT HEART, PERCUTANEOUS APPROACH: ICD-10-PCS | Performed by: STUDENT IN AN ORGANIZED HEALTH CARE EDUCATION/TRAINING PROGRAM

## 2025-01-12 PROCEDURE — C1725 CATH, TRANSLUMIN NON-LASER: HCPCS | Performed by: INTERNAL MEDICINE

## 2025-01-12 PROCEDURE — 80061 LIPID PANEL: CPT | Performed by: NURSE PRACTITIONER

## 2025-01-12 DEVICE — EVEROLIMUS-ELUTING PLATINUM CHROMIUM CORONARY STENT SYSTEM
Type: IMPLANTABLE DEVICE | Site: HEART | Status: FUNCTIONAL
Brand: SYNERGY™ XD

## 2025-01-12 RX ORDER — AMLODIPINE BESYLATE 10 MG/1
10 TABLET ORAL DAILY
Status: ACTIVE | OUTPATIENT
Start: 2025-01-12

## 2025-01-12 RX ORDER — ONDANSETRON HYDROCHLORIDE 2 MG/ML
4 INJECTION, SOLUTION INTRAVENOUS EVERY 6 HOURS PRN
Status: ACTIVE | OUTPATIENT
Start: 2025-01-12

## 2025-01-12 RX ORDER — AMOXICILLIN AND CLAVULANATE POTASSIUM 875; 125 MG/1; MG/1
1 TABLET, FILM COATED ORAL EVERY 12 HOURS SCHEDULED
Status: DISPENSED | OUTPATIENT
Start: 2025-01-12 | End: 2025-01-17

## 2025-01-12 RX ORDER — ATORVASTATIN CALCIUM 80 MG/1
80 TABLET, FILM COATED ORAL DAILY
Status: DISPENSED | OUTPATIENT
Start: 2025-01-12

## 2025-01-12 RX ORDER — LIDOCAINE HYDROCHLORIDE 10 MG/ML
INJECTION, SOLUTION EPIDURAL; INFILTRATION; INTRACAUDAL; PERINEURAL AS NEEDED
Status: DISCONTINUED | OUTPATIENT
Start: 2025-01-12 | End: 2025-01-12 | Stop reason: HOSPADM

## 2025-01-12 RX ORDER — INSULIN LISPRO 100 [IU]/ML
0-5 INJECTION, SOLUTION INTRAVENOUS; SUBCUTANEOUS
Status: DISPENSED | OUTPATIENT
Start: 2025-01-12

## 2025-01-12 RX ORDER — AMOXICILLIN 250 MG
1 CAPSULE ORAL NIGHTLY
Status: DISPENSED | OUTPATIENT
Start: 2025-01-12

## 2025-01-12 RX ORDER — MIDAZOLAM HYDROCHLORIDE 1 MG/ML
INJECTION, SOLUTION INTRAMUSCULAR; INTRAVENOUS AS NEEDED
Status: DISCONTINUED | OUTPATIENT
Start: 2025-01-12 | End: 2025-01-12 | Stop reason: HOSPADM

## 2025-01-12 RX ORDER — AMLODIPINE AND BENAZEPRIL HYDROCHLORIDE 10; 40 MG/1; MG/1
1 CAPSULE ORAL DAILY
Status: DISCONTINUED | OUTPATIENT
Start: 2025-01-12 | End: 2025-01-12

## 2025-01-12 RX ORDER — SIMETHICONE 80 MG
160 TABLET,CHEWABLE ORAL 4 TIMES DAILY
Status: DISPENSED | OUTPATIENT
Start: 2025-01-12

## 2025-01-12 RX ORDER — MAGNESIUM SULFATE HEPTAHYDRATE 40 MG/ML
2 INJECTION, SOLUTION INTRAVENOUS ONCE
Status: COMPLETED | OUTPATIENT
Start: 2025-01-12 | End: 2025-01-12

## 2025-01-12 RX ORDER — CEFAZOLIN SODIUM 1 G/50ML
SOLUTION INTRAVENOUS CONTINUOUS PRN
Status: COMPLETED | OUTPATIENT
Start: 2025-01-12 | End: 2025-01-12

## 2025-01-12 RX ORDER — NITROGLYCERIN 0.4 MG/1
0.4 TABLET SUBLINGUAL ONCE
Status: COMPLETED | OUTPATIENT
Start: 2025-01-12 | End: 2025-01-12

## 2025-01-12 RX ORDER — LISINOPRIL 20 MG/1
40 TABLET ORAL DAILY
Status: ACTIVE | OUTPATIENT
Start: 2025-01-12

## 2025-01-12 RX ORDER — TALC
6 POWDER (GRAM) TOPICAL ONCE
Status: COMPLETED | OUTPATIENT
Start: 2025-01-12 | End: 2025-01-12

## 2025-01-12 RX ORDER — DEXTROSE 50 % IN WATER (D50W) INTRAVENOUS SYRINGE
12.5
Status: ACTIVE | OUTPATIENT
Start: 2025-01-12

## 2025-01-12 RX ORDER — LISINOPRIL 20 MG/1
20 TABLET ORAL DAILY
Status: DISPENSED | OUTPATIENT
Start: 2025-01-12

## 2025-01-12 RX ORDER — NITROGLYCERIN 0.4 MG/1
0.4 TABLET SUBLINGUAL EVERY 5 MIN PRN
Status: DISPENSED | OUTPATIENT
Start: 2025-01-12

## 2025-01-12 RX ORDER — ACETAMINOPHEN 325 MG/1
650 TABLET ORAL EVERY 6 HOURS PRN
Status: ACTIVE | OUTPATIENT
Start: 2025-01-12

## 2025-01-12 RX ORDER — ONDANSETRON HYDROCHLORIDE 2 MG/ML
4 INJECTION, SOLUTION INTRAVENOUS ONCE
Status: COMPLETED | OUTPATIENT
Start: 2025-01-12 | End: 2025-01-12

## 2025-01-12 RX ORDER — EPTIFIBATIDE 2 MG/ML
INJECTION, SOLUTION INTRAVENOUS CONTINUOUS PRN
Status: COMPLETED | OUTPATIENT
Start: 2025-01-12 | End: 2025-01-12

## 2025-01-12 RX ORDER — VERAPAMIL HYDROCHLORIDE 2.5 MG/ML
INJECTION, SOLUTION INTRAVENOUS AS NEEDED
Status: DISCONTINUED | OUTPATIENT
Start: 2025-01-12 | End: 2025-01-12 | Stop reason: HOSPADM

## 2025-01-12 RX ORDER — MORPHINE SULFATE 4 MG/ML
4 INJECTION, SOLUTION INTRAMUSCULAR; INTRAVENOUS ONCE
Status: COMPLETED | OUTPATIENT
Start: 2025-01-12 | End: 2025-01-12

## 2025-01-12 RX ORDER — POLYETHYLENE GLYCOL 3350 17 G/17G
17 POWDER, FOR SOLUTION ORAL DAILY
Status: ACTIVE | OUTPATIENT
Start: 2025-01-12

## 2025-01-12 RX ORDER — NITROGLYCERIN 40 MG/100ML
INJECTION INTRAVENOUS AS NEEDED
Status: DISCONTINUED | OUTPATIENT
Start: 2025-01-12 | End: 2025-01-12 | Stop reason: HOSPADM

## 2025-01-12 RX ORDER — EPLERENONE 25 MG/1
25 TABLET, FILM COATED ORAL ONCE
Status: COMPLETED | OUTPATIENT
Start: 2025-01-12 | End: 2025-01-12

## 2025-01-12 RX ORDER — IPRATROPIUM BROMIDE AND ALBUTEROL SULFATE 2.5; .5 MG/3ML; MG/3ML
3 SOLUTION RESPIRATORY (INHALATION) EVERY 2 HOUR PRN
Status: ACTIVE | OUTPATIENT
Start: 2025-01-12

## 2025-01-12 RX ORDER — METOPROLOL TARTRATE 25 MG/1
25 TABLET, FILM COATED ORAL 2 TIMES DAILY
Status: DISPENSED | OUTPATIENT
Start: 2025-01-12

## 2025-01-12 RX ORDER — NAPROXEN SODIUM 220 MG/1
324 TABLET, FILM COATED ORAL ONCE
Status: COMPLETED | OUTPATIENT
Start: 2025-01-12 | End: 2025-01-12

## 2025-01-12 RX ORDER — IPRATROPIUM BROMIDE AND ALBUTEROL SULFATE 2.5; .5 MG/3ML; MG/3ML
3 SOLUTION RESPIRATORY (INHALATION)
Status: DISPENSED | OUTPATIENT
Start: 2025-01-12

## 2025-01-12 RX ORDER — FLUTICASONE PROPIONATE 50 MCG
2 SPRAY, SUSPENSION (ML) NASAL DAILY PRN
Status: ACTIVE | OUTPATIENT
Start: 2025-01-12

## 2025-01-12 RX ORDER — FENTANYL CITRATE 50 UG/ML
INJECTION, SOLUTION INTRAMUSCULAR; INTRAVENOUS AS NEEDED
Status: DISCONTINUED | OUTPATIENT
Start: 2025-01-12 | End: 2025-01-12 | Stop reason: HOSPADM

## 2025-01-12 RX ORDER — HEPARIN SODIUM 10000 [USP'U]/100ML
0-4000 INJECTION, SOLUTION INTRAVENOUS CONTINUOUS
Status: DISCONTINUED | OUTPATIENT
Start: 2025-01-12 | End: 2025-01-12

## 2025-01-12 RX ORDER — DEXTROSE 50 % IN WATER (D50W) INTRAVENOUS SYRINGE
25
Status: ACTIVE | OUTPATIENT
Start: 2025-01-12

## 2025-01-12 RX ADMIN — IOHEXOL 90 ML: 350 INJECTION, SOLUTION INTRAVENOUS at 12:29

## 2025-01-12 RX ADMIN — ATORVASTATIN CALCIUM 80 MG: 80 TABLET, FILM COATED ORAL at 10:42

## 2025-01-12 RX ADMIN — METOPROLOL TARTRATE 25 MG: 25 TABLET, FILM COATED ORAL at 20:04

## 2025-01-12 RX ADMIN — NITROGLYCERIN 0.4 MG: 0.4 TABLET SUBLINGUAL at 15:54

## 2025-01-12 RX ADMIN — INSULIN LISPRO 1 UNITS: 100 INJECTION, SOLUTION INTRAVENOUS; SUBCUTANEOUS at 12:01

## 2025-01-12 RX ADMIN — SIMETHICONE 160 MG: 80 TABLET, CHEWABLE ORAL at 17:33

## 2025-01-12 RX ADMIN — IPRATROPIUM BROMIDE AND ALBUTEROL SULFATE 3 ML: 2.5; .5 SOLUTION RESPIRATORY (INHALATION) at 19:31

## 2025-01-12 RX ADMIN — MORPHINE SULFATE 4 MG: 4 INJECTION, SOLUTION INTRAMUSCULAR; INTRAVENOUS at 05:30

## 2025-01-12 RX ADMIN — ONDANSETRON 4 MG: 2 INJECTION, SOLUTION INTRAMUSCULAR; INTRAVENOUS at 05:30

## 2025-01-12 RX ADMIN — TICAGRELOR 90 MG: 90 TABLET ORAL at 18:16

## 2025-01-12 RX ADMIN — IPRATROPIUM BROMIDE AND ALBUTEROL SULFATE 3 ML: 2.5; .5 SOLUTION RESPIRATORY (INHALATION) at 14:43

## 2025-01-12 RX ADMIN — EPLERENONE 25 MG: 25 TABLET, FILM COATED ORAL at 12:01

## 2025-01-12 RX ADMIN — LISINOPRIL 20 MG: 20 TABLET ORAL at 16:21

## 2025-01-12 RX ADMIN — TICAGRELOR 180 MG: 90 TABLET ORAL at 06:27

## 2025-01-12 RX ADMIN — AMOXICILLIN AND CLAVULANATE POTASSIUM 1 TABLET: 875; 125 TABLET, FILM COATED ORAL at 20:04

## 2025-01-12 RX ADMIN — ONDANSETRON 4 MG: 2 INJECTION, SOLUTION INTRAMUSCULAR; INTRAVENOUS at 15:34

## 2025-01-12 RX ADMIN — SIMETHICONE 160 MG: 80 TABLET, CHEWABLE ORAL at 20:04

## 2025-01-12 RX ADMIN — MAGNESIUM SULFATE HEPTAHYDRATE 2 G: 40 INJECTION, SOLUTION INTRAVENOUS at 10:42

## 2025-01-12 RX ADMIN — NITROGLYCERIN 0.4 MG: 0.4 TABLET SUBLINGUAL at 06:30

## 2025-01-12 RX ADMIN — Medication 6 MG: at 22:06

## 2025-01-12 RX ADMIN — ASPIRIN 324 MG: 81 TABLET, CHEWABLE ORAL at 06:26

## 2025-01-12 RX ADMIN — SENNOSIDES AND DOCUSATE SODIUM 1 TABLET: 50; 8.6 TABLET ORAL at 20:04

## 2025-01-12 RX ADMIN — AMOXICILLIN AND CLAVULANATE POTASSIUM 1 TABLET: 875; 125 TABLET, FILM COATED ORAL at 12:01

## 2025-01-12 SDOH — ECONOMIC STABILITY: HOUSING INSECURITY: AT ANY TIME IN THE PAST 12 MONTHS, WERE YOU HOMELESS OR LIVING IN A SHELTER (INCLUDING NOW)?: NO

## 2025-01-12 SDOH — SOCIAL STABILITY: SOCIAL INSECURITY: HAS ANYONE EVER THREATENED TO HURT YOUR FAMILY OR YOUR PETS?: NO

## 2025-01-12 SDOH — SOCIAL STABILITY: SOCIAL INSECURITY: DO YOU FEEL ANYONE HAS EXPLOITED OR TAKEN ADVANTAGE OF YOU FINANCIALLY OR OF YOUR PERSONAL PROPERTY?: NO

## 2025-01-12 SDOH — SOCIAL STABILITY: SOCIAL INSECURITY: ARE THERE ANY APPARENT SIGNS OF INJURIES/BEHAVIORS THAT COULD BE RELATED TO ABUSE/NEGLECT?: NO

## 2025-01-12 SDOH — ECONOMIC STABILITY: FOOD INSECURITY: WITHIN THE PAST 12 MONTHS, YOU WORRIED THAT YOUR FOOD WOULD RUN OUT BEFORE YOU GOT THE MONEY TO BUY MORE.: NEVER TRUE

## 2025-01-12 SDOH — SOCIAL STABILITY: SOCIAL INSECURITY: WITHIN THE LAST YEAR, HAVE YOU BEEN HUMILIATED OR EMOTIONALLY ABUSED IN OTHER WAYS BY YOUR PARTNER OR EX-PARTNER?: NO

## 2025-01-12 SDOH — SOCIAL STABILITY: SOCIAL INSECURITY: ARE YOU OR HAVE YOU BEEN THREATENED OR ABUSED PHYSICALLY, EMOTIONALLY, OR SEXUALLY BY ANYONE?: NO

## 2025-01-12 SDOH — SOCIAL STABILITY: SOCIAL INSECURITY: WITHIN THE LAST YEAR, HAVE YOU BEEN AFRAID OF YOUR PARTNER OR EX-PARTNER?: NO

## 2025-01-12 SDOH — SOCIAL STABILITY: SOCIAL INSECURITY
WITHIN THE LAST YEAR, HAVE YOU BEEN RAPED OR FORCED TO HAVE ANY KIND OF SEXUAL ACTIVITY BY YOUR PARTNER OR EX-PARTNER?: NO

## 2025-01-12 SDOH — HEALTH STABILITY: MENTAL HEALTH: HOW OFTEN DO YOU HAVE SIX OR MORE DRINKS ON ONE OCCASION?: NEVER

## 2025-01-12 SDOH — ECONOMIC STABILITY: HOUSING INSECURITY: IN THE LAST 12 MONTHS, WAS THERE A TIME WHEN YOU WERE NOT ABLE TO PAY THE MORTGAGE OR RENT ON TIME?: NO

## 2025-01-12 SDOH — ECONOMIC STABILITY: INCOME INSECURITY: IN THE PAST 12 MONTHS HAS THE ELECTRIC, GAS, OIL, OR WATER COMPANY THREATENED TO SHUT OFF SERVICES IN YOUR HOME?: NO

## 2025-01-12 SDOH — HEALTH STABILITY: MENTAL HEALTH: HOW OFTEN DO YOU HAVE A DRINK CONTAINING ALCOHOL?: NEVER

## 2025-01-12 SDOH — SOCIAL STABILITY: SOCIAL INSECURITY: WERE YOU ABLE TO COMPLETE ALL THE BEHAVIORAL HEALTH SCREENINGS?: YES

## 2025-01-12 SDOH — SOCIAL STABILITY: SOCIAL INSECURITY: DOES ANYONE TRY TO KEEP YOU FROM HAVING/CONTACTING OTHER FRIENDS OR DOING THINGS OUTSIDE YOUR HOME?: NO

## 2025-01-12 SDOH — SOCIAL STABILITY: SOCIAL INSECURITY
WITHIN THE LAST YEAR, HAVE YOU BEEN KICKED, HIT, SLAPPED, OR OTHERWISE PHYSICALLY HURT BY YOUR PARTNER OR EX-PARTNER?: NO

## 2025-01-12 SDOH — SOCIAL STABILITY: SOCIAL INSECURITY: HAVE YOU HAD THOUGHTS OF HARMING ANYONE ELSE?: NO

## 2025-01-12 SDOH — SOCIAL STABILITY: SOCIAL INSECURITY: ABUSE: ADULT

## 2025-01-12 SDOH — ECONOMIC STABILITY: FOOD INSECURITY: WITHIN THE PAST 12 MONTHS, THE FOOD YOU BOUGHT JUST DIDN'T LAST AND YOU DIDN'T HAVE MONEY TO GET MORE.: NEVER TRUE

## 2025-01-12 SDOH — HEALTH STABILITY: MENTAL HEALTH: HOW MANY DRINKS CONTAINING ALCOHOL DO YOU HAVE ON A TYPICAL DAY WHEN YOU ARE DRINKING?: PATIENT DOES NOT DRINK

## 2025-01-12 SDOH — ECONOMIC STABILITY: FOOD INSECURITY: HOW HARD IS IT FOR YOU TO PAY FOR THE VERY BASICS LIKE FOOD, HOUSING, MEDICAL CARE, AND HEATING?: NOT HARD AT ALL

## 2025-01-12 SDOH — SOCIAL STABILITY: SOCIAL INSECURITY: DO YOU FEEL UNSAFE GOING BACK TO THE PLACE WHERE YOU ARE LIVING?: NO

## 2025-01-12 SDOH — ECONOMIC STABILITY: TRANSPORTATION INSECURITY: IN THE PAST 12 MONTHS, HAS LACK OF TRANSPORTATION KEPT YOU FROM MEDICAL APPOINTMENTS OR FROM GETTING MEDICATIONS?: NO

## 2025-01-12 SDOH — ECONOMIC STABILITY: HOUSING INSECURITY: IN THE PAST 12 MONTHS, HOW MANY TIMES HAVE YOU MOVED WHERE YOU WERE LIVING?: 1

## 2025-01-12 ASSESSMENT — COGNITIVE AND FUNCTIONAL STATUS - GENERAL
MOBILITY SCORE: 24
MOBILITY SCORE: 24
DAILY ACTIVITIY SCORE: 24
DAILY ACTIVITIY SCORE: 24
MOBILITY SCORE: 24
DAILY ACTIVITIY SCORE: 24
MOBILITY SCORE: 24
PATIENT BASELINE BEDBOUND: NO
DAILY ACTIVITIY SCORE: 24

## 2025-01-12 ASSESSMENT — PAIN DESCRIPTION - FREQUENCY: FREQUENCY: CONSTANT/CONTINUOUS

## 2025-01-12 ASSESSMENT — LIFESTYLE VARIABLES
HOW MANY STANDARD DRINKS CONTAINING ALCOHOL DO YOU HAVE ON A TYPICAL DAY: PATIENT DOES NOT DRINK
HOW OFTEN DO YOU HAVE 6 OR MORE DRINKS ON ONE OCCASION: NEVER
SKIP TO QUESTIONS 9-10: 1
HOW OFTEN DO YOU HAVE A DRINK CONTAINING ALCOHOL: NEVER
AUDIT-C TOTAL SCORE: 0
AUDIT-C TOTAL SCORE: 0
SKIP TO QUESTIONS 9-10: 1
AUDIT-C TOTAL SCORE: 0

## 2025-01-12 ASSESSMENT — PAIN DESCRIPTION - PAIN TYPE
TYPE: ACUTE PAIN
TYPE: ACUTE PAIN

## 2025-01-12 ASSESSMENT — ENCOUNTER SYMPTOMS
DIZZINESS: 1
GASTROINTESTINAL NEGATIVE: 1
SORE THROAT: 1
COUGH: 1
LIGHT-HEADEDNESS: 1
WHEEZING: 1
FATIGUE: 1
SHORTNESS OF BREATH: 1
BACK PAIN: 1
ACTIVITY CHANGE: 1
PALPITATIONS: 0

## 2025-01-12 ASSESSMENT — ACTIVITIES OF DAILY LIVING (ADL)
BATHING: INDEPENDENT
HEARING - RIGHT EAR: FUNCTIONAL
GROOMING: INDEPENDENT
JUDGMENT_ADEQUATE_SAFELY_COMPLETE_DAILY_ACTIVITIES: YES
PATIENT'S MEMORY ADEQUATE TO SAFELY COMPLETE DAILY ACTIVITIES?: YES
WALKS IN HOME: INDEPENDENT
HEARING - LEFT EAR: FUNCTIONAL
LACK_OF_TRANSPORTATION: NO
FEEDING YOURSELF: INDEPENDENT
LACK_OF_TRANSPORTATION: NO
TOILETING: INDEPENDENT
ADEQUATE_TO_COMPLETE_ADL: YES
DRESSING YOURSELF: INDEPENDENT

## 2025-01-12 ASSESSMENT — PAIN - FUNCTIONAL ASSESSMENT
PAIN_FUNCTIONAL_ASSESSMENT: 0-10

## 2025-01-12 ASSESSMENT — PAIN DESCRIPTION - DESCRIPTORS
DESCRIPTORS: SHARP
DESCRIPTORS: SHARP

## 2025-01-12 ASSESSMENT — HEART SCORE
RISK FACTORS: >2 RISK FACTORS OR HX OF ATHEROSCLEROTIC DISEASE
HISTORY: SLIGHTLY SUSPICIOUS
AGE: 65+
ECG: NON-SPECIFIC REPOLARIZATION DISTURBANCE

## 2025-01-12 ASSESSMENT — PAIN SCALES - GENERAL
PAINLEVEL_OUTOF10: 8
PAINLEVEL_OUTOF10: 0 - NO PAIN
PAINLEVEL_OUTOF10: 10 - WORST POSSIBLE PAIN
PAINLEVEL_OUTOF10: 0 - NO PAIN

## 2025-01-12 ASSESSMENT — PAIN DESCRIPTION - LOCATION
LOCATION: CHEST
LOCATION: CHEST

## 2025-01-12 ASSESSMENT — PAIN DESCRIPTION - ORIENTATION
ORIENTATION: MID
ORIENTATION: MID

## 2025-01-12 ASSESSMENT — PATIENT HEALTH QUESTIONNAIRE - PHQ9
SUM OF ALL RESPONSES TO PHQ9 QUESTIONS 1 & 2: 0
1. LITTLE INTEREST OR PLEASURE IN DOING THINGS: NOT AT ALL
2. FEELING DOWN, DEPRESSED OR HOPELESS: NOT AT ALL

## 2025-01-12 NOTE — CONSULTS
Inpatient consult to Cardiology  Consult performed by: XOCHITL Palacios-CNP  Consult ordered by: XOCHITL Hansen-CNP  Reason for consult: STEMI      HPI:  Marsha Tapia is a 69 y.o. female, with a PMH of HTN, HLD, asymptomatic sinus bradycardia, T2DM, breast CA s/p mastectomy/chemo/radiation, IgA deficiency, aortic stenosis, OA, GERD, and hx of gastric bypass, who presented to [facility] on 1/12/2025 for chest pain. Patient reports she was initially seen in urgent care on 1/9 for URI symptoms- nasal congestion, cough, rhinorrhea, headache, and myalgias. She was given PO abx and sent home. Her BP was noted to be high during that visit, 180/72, but improved prior to her leaving. She continued to feel worse with increase chest pain, n/v, and fatigue over the past few days that she associated to the illness. This morning, she woke up with worsening chest pain, SOB, and vomiting so she called 911.     ED course notable for  ST elevation in anterior leads, STEMI alert activated she was given 324 ASA, 180 Brilinta , ntg, morphine and taken to emergently to the cath lab. LHC revealed 100% thrombotic occlusion to the mid-LAD s/p aspiration thrombectomy and PCI with Synergy 2.5x 32 LIAM. Patient was admitted to the ICU post-catheterization.    Cardiology is consulted for post STEMI care/recommendations.     Home CV Medications:  Amlodipine-Benazepril 10mg-40mg daily  Atorvastatin 20mg daily  HCTZ 12.5mg daily      Patient History   Past Medical History:  She has a past medical history of Breast cancer (Multi), antineoplastic chemo, Localized enlarged lymph nodes (06/22/2021), Personal history of irradiation, and Personal history of malignant neoplasm of breast.  Past Surgical History:  She has a past surgical history that includes Other surgical history (10/12/2020); Other surgical history (10/12/2020); Other surgical history (10/12/2020); Other surgical history (10/12/2020); Other surgical history  "(10/28/2022); Other surgical history (09/30/2021); MR angio head wo IV contrast (07/20/2021); MR angio neck wo IV contrast (07/20/2021); and Breast lumpectomy.  Social History:  She reports that she has never smoked. She does not have any smokeless tobacco history on file. She reports that she does not drink alcohol and does not use drugs.  Family History: family history is not on file.     Allergies: Patient has no known allergies.    ROS: 10-point review of systems was performed and is otherwise negative except as noted in HPI.     Outpatient Medications:  Current Outpatient Medications   Medication Instructions    acetaminophen (TYLENOL) 650 mg, Every 6 hours PRN    amLODIPine-benazepriL (Lotrel) 10-40 mg capsule 1 capsule, oral, Daily    atorvastatin (Lipitor) 20 mg tablet 1 tablet, Daily    betamethasone dipropionate (Diprolene) 0.05 % ointment 1 Application    cephalexin (Keftab) 500 mg tablet 1 tablet, 3 times daily    cetirizine (ZyrTEC) 10 mg tablet 1 tablet, Daily    cyclobenzaprine (Flexeril) 10 mg tablet 1 tablet, Every 8 hours PRN    docusate sodium (COLACE) 100 mg, 2 times daily    doxycycline (Vibramycin) 100 mg capsule Take 1 capsule PO every day x10 days. Take with at least 8 ounces (large glass) of water, do not lie down for 30 minutes after.    fluticasone (Flonase) 50 mcg/actuation nasal spray 2 sprays, Daily    foam bandage (Allevyn) 2 X 2 \" bandage Apply to right axilla as directed    hydroCHLOROthiazide (MICROZIDE) 12.5 mg, oral, Daily    ibuprofen 600 mg tablet 1 tablet, 3 times daily    lidocaine (Lidoderm) 5 % patch Apply topically to affected area once a day, to either side of surgical incision    metFORMIN (GLUCOPHAGE) 500 mg, oral, 2 times daily (morning and late afternoon)    mupirocin (Bactroban) 2 % ointment Apply a thin layer to the affected area three times a day x 1 week    nitroglycerin (NITROSTAT) 0.4 mg, Every 5 min PRN    nystatin (Mycostatin) 100,000 unit/gram powder 1 " Application, Topical, 2 times daily    predniSONE (DELTASONE) 20 mg    triamcinolone (Kenalog) 0.1 % cream 1 Application       Cardiovascular Testing:  EKG:   ECG 12 Lead 1/12/25 Initial EKG      EKG 12 Lead 1/12/25 Repeat EKG      EKG 12 Lead 1/12/25 Post-Cath EKG      Echo:  Transthoracic Echocardiogram 11/4/24   1. Left ventricular ejection fraction is normal, by visual estimate at 65-70%.   2. Spectral Doppler shows a Grade II (pseudonormal pattern) of left ventricular diastolic filling with an elevated left atrial pressure.   3. There is moderately increased septal and moderately increased posterior left ventricular wall thickness.   4. There is severely increased concentric left ventricular hypertrophy.   5. There is normal right ventricular global systolic function.   6. The left atrium is severely dilated.   7. The right atrium is moderately dilated.   8. Moderate aortic valve stenosis.   9. There is moderate aortic valve cusp calcification.    Transthoracic Echocardiogram 7/21/21   1. The left ventricular systolic function is normal with a 70-75% estimated ejection fraction.   2. Poorly visualized anatomical structures due to suboptimal image quality.   3. Spectral Doppler shows a pseudonormal pattern of left ventricular diastolic filling.   4. Increased LV mass.   5. The left atrium is severely dilated.   6. Mild aortic valve stenosis.    Cath:  Left Heart Catheterization 1/12/25  LM: no obstructive CAD            LAD: mid 100% thrombotic occlusion now s/p aspiration thrombectomy and PCI with Synergy 2.5 x 32 LIAM  LCX: mid 50% stenosis  RCA: no obstructive CAD    Stress Test:  Nuclear Stress Test 2021  1. Relatively normal stress myocardial perfusion imaging in response to pharmacologic stress in the setting of moderate breast attenuation.  2. Well-maintained left ventricular function.    Cardiac Imaging:   CT Heart Calcium Score 2021  1. Coronary artery calcium score of 208*.     Coronary Artery Score             Annual Risk   0-99                              0.4%  100-399                        1.3%  >400                             2.4%      Diagnostic Results   Labs  CBC- 2025:  5:12 AM  7.1 9.7 231    33.2      BMP- 2025:  5:12 AM  133 11 103 194   4.8 0.70 23    Estimated Creatinine Clearance: 78.4 mL/min (by C-G formula based on SCr of 0.7 mg/dL).     CA: 8.2 PROTIEN: 6.7 ALT: 8 Total Bili: 0.4 M.64   PHOS: 3.4 ALBUMIN: 3.6 AST: 13   Alk Phos: 102      COAGS- No results in last year.  _   _ 32     CV Labs  Troponin I, High Sensitivity   Date/Time Value Ref Range Status   2025 06:33  (HH) 0 - 13 ng/L Final     Comment:     Previous result verified on 2025 0604 on specimen/case [id number] called with component [facility] for procedure Troponin I, High Sensitivity, Initial with value 122 ng/L.   2025 05:12  (HH) 0 - 13 ng/L Final   2024 12:13 AM 7 0 - 13 ng/L Final     BNP   Date/Time Value Ref Range Status   2025 05:12  (H) 0 - 99 pg/mL Final   2021 08:10 PM 92 0 - 99 pg/mL Final     Comment:     .  <100 pg/mL - Heart failure unlikely  100-299 pg/mL - Intermediate probability of acute heart  .               failure exacerbation. Correlate with clinical  .               context and patient history.    >=300 pg/mL - Heart Failure likely. Correlate with clinical  .               context and patient history.  BNP testing is performed using different testing   methodology at [facility] than at other   [facility] hospitals. Direct result comparisons should   only be made within the same method.       POC HEMOGLOBIN A1c   Date/Time Value Ref Range Status   10/25/2024 12:04 PM 7.0 (A) 4.2 - 6.5 % Final     Hemoglobin A1C   Date/Time Value Ref Range Status   2024 08:35 AM 7.0 (H) see below % Final   2022 08:17 AM 6.4 (A) % Final     Comment:          Diagnosis of Diabetes-Adults   Non-Diabetic: < or = 5.6%   Increased risk for  developing diabetes: 5.7-6.4%   Diagnostic of diabetes: > or = 6.5%  .       Monitoring of Diabetes                Age (y)     Therapeutic Goal (%)   Adults:          >18           <7.0   Pediatrics:    13-18           <7.5                   7-12           <8.0                   0- 6            7.5-8.5   American Diabetes Association. Diabetes Care 33(S1), Jan 2010.     04/27/2021 12:15 PM 7.2 % Final     Comment:          Diagnosis of Diabetes-Adults   Non-Diabetic: < or = 5.6%   Increased risk for developing diabetes: 5.7-6.4%   Diagnostic of diabetes: > or = 6.5%  .       Monitoring of Diabetes                Age (y)     Therapeutic Goal (%)   Adults:          >18           <7.0   Pediatrics:    13-18           <7.5                   7-12           <8.0                   0- 6            7.5-8.5   American Diabetes Association. Diabetes Care 33(S1), Jan 2010.       LDL Calculated   Date/Time Value Ref Range Status   01/12/2025 06:33  (H) <=99 mg/dL Final     Comment:                                 Near   Borderline      AGE      Desirable  Optimal    High     High     Very High     0-19 Y     0 - 109     ---    110-129   >/= 130     ----    20-24 Y     0 - 119     ---    120-159   >/= 160     ----      >24 Y     0 -  99   100-129  130-159   160-189     >/=190     04/16/2024 08:35  (H) <=99 mg/dL Final     Comment:                                 Near   Borderline      AGE      Desirable  Optimal    High     High     Very High     0-19 Y     0 - 109     ---    110-129   >/= 130     ----    20-24 Y     0 - 119     ---    120-159   >/= 160     ----      >24 Y     0 -  99   100-129  130-159   160-189     >/=190       VLDL   Date/Time Value Ref Range Status   01/12/2025 06:33 AM 13 0 - 40 mg/dL Final   04/16/2024 08:35 AM 14 0 - 40 mg/dL Final   11/09/2022 08:17 AM 10 0 - 40 mg/dL Final   04/27/2021 12:15 PM 16 0 - 40 mg/dL Final   10/19/2020 07:31 AM 16 0 - 40 mg/dL Final       Pertinent Imaging  XR chest  1 view 01/12/2025  No overt edema, pneumothorax, pleural effusion, or focal consolidation.       Last Recorded Vitals:  Vitals:    01/12/25 0523 01/12/25 0650 01/12/25 0903 01/12/25 0915   BP: 160/89 141/74     Pulse: 58 65     Resp: 20 20     Temp:    37.1 °C (98.8 °F)   TempSrc:    Temporal   SpO2: 100% 96% 95%    Weight:         Temp:  [36.5 °C (97.7 °F)-37.1 °C (98.8 °F)] 37.1 °C (98.8 °F)  Heart Rate:  [58-65] 65  Resp:  [18-20] 20  BP: (141-164)/(72-89) 141/74     Last I/O:  No intake/output data recorded.    Physical Exam:   Vitals and nursing notes reviewed.  /74   Pulse 65   Temp 37.1 °C (98.8 °F) (Temporal)   Resp 20   Wt 81.6 kg (180 lb)   SpO2 95%   BMI 30.90 kg/m²   GENERAL: Alert and awake, cooperative; in no acute distress  SKIN: Warm and dry, cap refill <2, cath site wo s/s bleeding or infection  HEENT: Normocephalic, PEERL, mucous membranes pink and moist  NECK: No JVD or hepatojugular reflex  CARDIAC: Regular rate and rhythm, S1S2, no murmurs or abnormal heart sounds  CHEST: Normal respiratory effort, no abnormal breath sounds  ABDOMEN: Soft, non-distended, non-tender with palpation  EXTREMITIES: No lower extremity edema, normal pulses all 4 extremities  NEURO: Alert and oriented, mental status at baseline, no focal deficits  PSYCH: Behavior and affect as expected     Tele: SB HR 50s  Code Status: Full Code    Assessment/Plan   Marsha Tapia is a 69 y.o. female, with a PMH of HTN, HLD, asymptomatic sinus bradycardia, T2DM, breast CA s/p mastectomy/chemo/radiation, IgA deficiency, aortic stenosis, OA, GERD, and hx of gastric bypass, who presented to [facility] on 1/12/2025 for chest pain. Patient reports she was initially seen in urgent care on 1/9 for URI symptoms- nasal congestion, cough, rhinorrhea, headache, and myalgias. She was given PO abx and sent home. Her BP was noted to be high during that visit, 180/72, but improved prior to her leaving. She continued to feel worse  with increase chest pain, n/v, and fatigue over the past few days that she associated to the illness. This morning, she woke up with worsening chest pain, SOB, and vomiting so she called 911.     ED course notable for  ST elevation in anterior leads, STEMI alert activated she was given 324 ASA, 180 Brilinta, ntg, morphine and taken to emergently to the cath lab. LHC revealed 100% thrombotic occlusion to the mid-LAD s/p aspiration thrombectomy and PCI with Synergy 2.5x 32 LIAM. Patient was admitted to the ICU post-catheterization. Cardiology is consulted for post STEMI care/recommendations.     Home CV Medications: Amlodipine-Benazepril 10mg-40mg daily, Atorvastatin 20mg daily, HCTZ 12.5mg daily    #CAD c/b STEMI to the LAD- Cath revealed 100% thrombotic occlusion to the mid-LAD s/p aspiration thrombectomy and PCI with Synergy 2.5x 32 LIAM  -ASA/Ticagrelor for 1 year without interruption followed by lifelong ASA  -Atorvastatin increased to 80mg daily    #HTN- BP currently elevated, resume home medications   #HLD- , increase statin to high intensity  #DM- Optimize glycemic control      RECOMMENDATIONS:  -TTE to be completed in AM  -Trend troponin q4 to peak then stop  -DAPT with ASA/Ticagrelor x1yr without interruption, then lifelong ASA  -Increase statin to high intensity  -Start low dose BB if tolerated  -Resume home medications as indicated for BP control  -Check HgbA1c, may need endocrine follow-up for improved glycemic control, SSI while admitted  -Continuous telemetry monitoring  -Monitor electrolytes, replete for K <4 and Mg <2  -To remain in the ICU 24 hours post-cath for close monitoring  -Cardiac rehab referral at discharge  -Patient will need FU with cardiology in 2-3 weeks post-discharge      XOCHITL Palacios-CNP  Advanced Practice Provider  Cardiology  [facility]  01/12/25 11:44 AM     ==========================  Attending note  ==========================  Both the JULY and I  have had a face to face encounter with the patient today. I have examined the patient and edited the documented physical examination as necessary.  I personally reviewed the patient's recent labs, medications, orders, EKGs, and pertinent cardiac imaging.  I have reviewed the JULY's encounter note, approve the JULY's documentation and have edited the note to reflect the diagnostic and therapeutic plan.    69-year-old female with a history of hypertension, hyperlipidemia, asymptomatic sinus bradycardia, type 2 diabetes, breast cancer post-mastectomy/chemotherapy/radiation, IgA deficiency, aortic stenosis, osteoarthritis, GERD, and gastric bypass, presented to [facility] on 1/12/2025 with chest pain following a recent urgent care visit for URI symptoms where she was treated with oral antibiotics; she had persistent chest pain, nausea, vomiting, and fatigue over several days. On presentation, she had worsening chest pain, shortness of breath, and vomiting, prompting a 911 call. ED evaluation revealed ST elevation in the anterior leads, STEMI alert was activated, and the patient received  mg, Brilinta 180 mg, nitroglycerin, and morphine before emergent LHC, which showed a 100% thrombotic occlusion of the mid-LAD, treated successfully with aspiration thrombectomy and PCI with a 2.5x32 Synergy LIAM. She was admitted to the ICU post-catheterization for continued management, and cardiology is consulted for post-STEMI care and recommendations. Home cardiovascular medications include amlodipine-benazepril 10mg-40mg daily, atorvastatin 20mg daily, and HCTZ 12.5mg daily.    Past medical history:  As above.    Medications were reviewed.    Allergies were reviewed.    Vital signs, telemetry, medications, labs, and imaging were reviewed as well.    Physical Exam:   Vitals and nursing notes reviewed.  /74   Pulse 65   Temp 37.1 °C (98.8 °F) (Temporal)   Resp 20   Wt 81.6 kg (180 lb)   SpO2 95%   BMI 30.90  kg/m²   GENERAL: Alert and awake, cooperative; in no acute distress  SKIN: Warm and dry, cap refill <2, cath site wo s/s bleeding or infection  HEENT: Normocephalic, PEERL, mucous membranes pink and moist  NECK: No JVD or hepatojugular reflex  CARDIAC: Regular rate and rhythm, S1S2, no murmurs or abnormal heart sounds  CHEST: Normal respiratory effort, no abnormal breath sounds  ABDOMEN: Soft, non-distended, non-tender with palpation  EXTREMITIES: No lower extremity edema, normal pulses all 4 extremities  NEURO: Alert and oriented, mental status at baseline, no focal deficits  PSYCH: Behavior and affect as expected     Tele: SB HR 50s  Code Status: Full Code    Assessment/Plan   Marsha Tapia is a 69 y.o. female, with a PMH of HTN, HLD, asymptomatic sinus bradycardia, T2DM, breast CA s/p mastectomy/chemo/radiation, IgA deficiency, aortic stenosis, OA, GERD, and hx of gastric bypass, who presented to [facility] on 1/12/2025 for chest pain. Patient reports she was initially seen in urgent care on 1/9 for URI symptoms- nasal congestion, cough, rhinorrhea, headache, and myalgias. She was given PO abx and sent home. Her BP was noted to be high during that visit, 180/72, but improved prior to her leaving. She continued to feel worse with increase chest pain, n/v, and fatigue over the past few days that she associated to the illness. This morning, she woke up with worsening chest pain, SOB, and vomiting so she called 911.     ED course notable for  ST elevation in anterior leads, STEMI alert activated she was given 324 ASA, 180 Brilinta, ntg, morphine and taken to emergently to the cath lab. LHC revealed 100% thrombotic occlusion to the mid-LAD s/p aspiration thrombectomy and PCI with Synergy 2.5x 32 LIAM. Patient was admitted to the ICU post-catheterization. Cardiology is consulted for post STEMI care/recommendations.     Home CV Medications: Amlodipine-Benazepril 10mg-40mg daily, Atorvastatin 20mg daily, HCTZ  12.5mg daily    #CAD c/b STEMI to the LAD- Cath revealed 100% thrombotic occlusion to the mid-LAD s/p aspiration thrombectomy and PCI with Synergy 2.5x 32 LIAM  -ASA/Ticagrelor for 1 year without interruption followed by lifelong ASA  -Atorvastatin increased to 80mg daily    #HTN- BP currently elevated, resume home medications   #HLD- , increase statin to high intensity  #DM- Optimize glycemic control      RECOMMENDATIONS:  -TTE to be completed in AM  -Trend troponin q4 to peak then stop  -DAPT with ASA/Ticagrelor x1yr without interruption, then lifelong ASA  -Increase statin to high intensity  -Start low dose BB if tolerated  -Resume medications as indicated for BP control  -Check HgbA1c, may need endocrine follow-up for improved glycemic control, SSI while admitted  -Continuous telemetry monitoring  -Monitor electrolytes, replete for K <4 and Mg <2  -To remain in the ICU 24 hours post-cath for close monitoring  -Cardiac rehab referral at discharge  -Patient will need FU with cardiology in 2-3 weeks post-discharge    Srinivas Perez MD

## 2025-01-12 NOTE — H&P
Critical Care Medicine History and Physical  Subjective   Patient is a 69 y.o. female admitted on 1/12/2025  4:27 AM  with chief complaint of STEMI.     HPI:  Marsha Tapia 69 y.o female with PMH including breast cancer, GERD, gastric bypass, vitamin deficiency, HTN, HLD, T2DM with complaints of upper respiratory symptoms since Thursday was seen at urgent care prescribed abx and sent home.  The past few day she continued to feel worse with increased chest pain, nausea, vomiting, and fatigue.  This morning she called 911 d/t worsening chest pain, vomiting and shortness of breath.  Upon arrival to ED she was found to have ST elevation in anterior leads, STEMI alert activated she was given 324 ASA, 180 Brilinta , ntg, morphine and taken to Cath lab.   LHC revealed LAD: mid 100% thrombotic occlusion now s/p aspiration thrombectomy and PCI with Synergy 2.5 x 32 LIAM and was admitted to ICU for further observation.     Past Medical History:   Diagnosis Date    Breast cancer (Multi)     Hx antineoplastic chemo     Localized enlarged lymph nodes 06/22/2021    Axillary adenopathy    Personal history of irradiation     Personal history of malignant neoplasm of breast     History of malignant neoplasm of left breast     Past Surgical History:   Procedure Laterality Date    BREAST LUMPECTOMY      MR HEAD ANGIO WO IV CONTRAST  07/20/2021    MR HEAD ANGIO WO IV CONTRAST 7/20/2021 [facility]    MR NECK ANGIO WO IV CONTRAST  07/20/2021    MR NECK ANGIO WO IV CONTRAST 7/20/2021 [facility]    OTHER SURGICAL HISTORY  10/12/2020    Shoulder replacement    OTHER SURGICAL HISTORY  10/12/2020    Bariatric surgery    OTHER SURGICAL HISTORY  10/12/2020    Cataract surgery    OTHER SURGICAL HISTORY  10/12/2020    Hysterectomy    OTHER SURGICAL HISTORY  10/28/2022    Lumpectomy    OTHER SURGICAL HISTORY  09/30/2021    Cyst excision     Medications Prior to Admission   Medication Sig Dispense Refill Last Dose/Taking     "acetaminophen (Tylenol) 325 mg tablet Take 2 tablets (650 mg) by mouth every 6 hours if needed.       amLODIPine-benazepriL (Lotrel) 10-40 mg capsule Take 1 capsule by mouth once daily. 90 capsule 0     atorvastatin (Lipitor) 20 mg tablet Take 1 tablet (20 mg) by mouth once daily.       betamethasone dipropionate (Diprolene) 0.05 % ointment 1 Application.       cephalexin (Keftab) 500 mg tablet Take 1 tablet (500 mg) by mouth 3 times a day.       cetirizine (ZyrTEC) 10 mg tablet Take 1 tablet (10 mg) by mouth once daily.       cyclobenzaprine (Flexeril) 10 mg tablet Take 1 tablet (10 mg) by mouth every 8 hours if needed.       docusate sodium (Colace) 100 mg capsule Take 1 capsule (100 mg) by mouth twice a day.       doxycycline (Vibramycin) 100 mg capsule Take 1 capsule PO every day x10 days. Take with at least 8 ounces (large glass) of water, do not lie down for 30 minutes after. 10 capsule 0     fluticasone (Flonase) 50 mcg/actuation nasal spray Administer 2 sprays into affected nostril(s) once daily.       foam bandage (Allevyn) 2 X 2 \" bandage Apply to right axilla as directed 10 each 0     hydroCHLOROthiazide (Microzide) 12.5 mg tablet Take 1 tablet (12.5 mg) by mouth once daily. 90 tablet 0     ibuprofen 600 mg tablet Take 1 tablet (600 mg) by mouth 3 times a day.       lidocaine (Lidoderm) 5 % patch Apply topically to affected area once a day, to either side of surgical incision       metFORMIN (Glucophage) 500 mg tablet Take 1 tablet (500 mg) by mouth 2 times daily (morning and late afternoon). 120 tablet 1     mupirocin (Bactroban) 2 % ointment Apply a thin layer to the affected area three times a day x 1 week 15 g 0     nitroglycerin (Nitrostat) 0.4 mg SL tablet Place 1 tablet (0.4 mg) under the tongue every 5 minutes if needed for chest pain.       nystatin (Mycostatin) 100,000 unit/gram powder Apply 1 Application topically 2 times a day. 60 g 1     predniSONE (Deltasone) 20 mg tablet 1 tablet (20 mg).   "     triamcinolone (Kenalog) 0.1 % cream 1 Application.        Patient has no known allergies.  Social History     Tobacco Use    Smoking status: Never   Substance Use Topics    Alcohol use: Never    Drug use: Never     No family history on file.    Scheduled Medications:   [Held by provider] amLODIPine, 10 mg, oral, Daily   And  [Held by provider] lisinopril, 40 mg, oral, Daily  atorvastatin, 80 mg, oral, Daily  insulin lispro, 0-5 Units, subcutaneous, TID AC  ipratropium-albuteroL, 3 mL, nebulization, q6h  magnesium sulfate, 2 g, intravenous, Once  perflutren lipid microspheres, 0.5-10 mL of dilution, intravenous, Once in imaging  perflutren protein A microsphere, 0.5 mL, intravenous, Once in imaging  polyethylene glycol, 17 g, oral, Daily  sennosides-docusate sodium, 1 tablet, oral, Nightly  sulfur hexafluoride microsphr, 2 mL, intravenous, Once in imaging       Continuous Medications:         PRN Medications:   PRN medications: acetaminophen, dextrose, dextrose, fluticasone, glucagon, glucagon, ipratropium-albuteroL, nitroglycerin    Review of Systems:  .Review of Systems   Constitutional:  Positive for activity change and fatigue.   HENT:  Positive for congestion and sore throat.    Respiratory:  Positive for cough, shortness of breath and wheezing.    Cardiovascular:  Positive for chest pain. Negative for palpitations and leg swelling.   Gastrointestinal: Negative.    Genitourinary: Negative.    Musculoskeletal:  Positive for back pain.   Neurological:  Positive for dizziness and light-headedness.   All other systems reviewed and are negative.       Objective   Vitals:  Most Recent:  Vitals:    01/12/25 0915   BP:    Pulse:    Resp:    Temp: 37.1 °C (98.8 °F)   SpO2:        24hr Min/Max:  Temp  Min: 36.5 °C (97.7 °F)  Max: 37.1 °C (98.8 °F)  Pulse  Min: 58  Max: 65  BP  Min: 141/74  Max: 164/72  Resp  Min: 18  Max: 20  SpO2  Min: 95 %  Max: 100 %    LDA:   Arterial Sheath 01/12/25 6 Fr. Right (Active)    Placement Date: 01/12/25   Sheath Size: 6 Fr.  Line Orientation: Right  Site Prep: Chlorhexidine    Number of days: 0       Arterial Sheath 01/12/25 6 Fr. Right Femoral (Active)   Placement Date: 01/12/25   Sheath Size: 6 Fr.  Line Orientation: Right  Sheath Insertion Site: Femoral  Site Prep: Chlorhexidine    Number of days: 0         Vent settings:   N/a    Hemodynamic parameters for last 24 hours:         Intake/Output Summary (Last 24 hours) at 1/12/2025 0956  Last data filed at 1/12/2025 0821  Gross per 24 hour   Intake --   Output 10 ml   Net -10 ml           Physical exam:    Physical Exam  Vitals reviewed.   Constitutional:       General: She is not in acute distress.     Appearance: She is obese. She is not toxic-appearing or diaphoretic.      Comments: Resting comfortably, arouses easily, conversant    Cardiovascular:      Rate and Rhythm: Normal rate and regular rhythm.      Pulses: Normal pulses.      Heart sounds: No murmur heard.     Comments: Right radial band in place no edema  Right femoral site c/d/I soft minor bruising  Pulmonary:      Effort: Pulmonary effort is normal. No respiratory distress.      Breath sounds: No stridor. Wheezing and rhonchi present.   Abdominal:      Palpations: Abdomen is soft.   Musculoskeletal:         General: No swelling.   Skin:     General: Skin is warm and dry.      Findings: No bruising.   Neurological:      Mental Status: She is alert.        Lab/Radiology/Diagnostic Review:  Results for orders placed or performed during the hospital encounter of 01/12/25 (from the past 24 hours)   CBC and Auto Differential   Result Value Ref Range    WBC 7.1 4.4 - 11.3 x10*3/uL    nRBC 0.0 0.0 - 0.0 /100 WBCs    RBC 4.15 4.00 - 5.20 x10*6/uL    Hemoglobin 9.7 (L) 12.0 - 16.0 g/dL    Hematocrit 33.2 (L) 36.0 - 46.0 %    MCV 80 80 - 100 fL    MCH 23.4 (L) 26.0 - 34.0 pg    MCHC 29.2 (L) 32.0 - 36.0 g/dL    RDW 18.4 (H) 11.5 - 14.5 %    Platelets 231 150 - 450 x10*3/uL     Neutrophils % 66.5 40.0 - 80.0 %    Immature Granulocytes %, Automated 0.4 0.0 - 0.9 %    Lymphocytes % 20.5 13.0 - 44.0 %    Monocytes % 8.0 2.0 - 10.0 %    Eosinophils % 3.9 0.0 - 6.0 %    Basophils % 0.7 0.0 - 2.0 %    Neutrophils Absolute 4.71 1.20 - 7.70 x10*3/uL    Immature Granulocytes Absolute, Automated 0.03 0.00 - 0.70 x10*3/uL    Lymphocytes Absolute 1.45 1.20 - 4.80 x10*3/uL    Monocytes Absolute 0.57 0.10 - 1.00 x10*3/uL    Eosinophils Absolute 0.28 0.00 - 0.70 x10*3/uL    Basophils Absolute 0.05 0.00 - 0.10 x10*3/uL   Comprehensive Metabolic Panel   Result Value Ref Range    Glucose 194 (H) 74 - 99 mg/dL    Sodium 133 (L) 136 - 145 mmol/L    Potassium 4.8 3.5 - 5.3 mmol/L    Chloride 103 98 - 107 mmol/L    Bicarbonate 23 21 - 32 mmol/L    Anion Gap 12 10 - 20 mmol/L    Urea Nitrogen 11 6 - 23 mg/dL    Creatinine 0.70 0.50 - 1.05 mg/dL    eGFR >90 >60 mL/min/1.73m*2    Calcium 8.2 (L) 8.6 - 10.3 mg/dL    Albumin 3.6 3.4 - 5.0 g/dL    Alkaline Phosphatase 102 33 - 136 U/L    Total Protein 6.7 6.4 - 8.2 g/dL    AST 13 9 - 39 U/L    Bilirubin, Total 0.4 0.0 - 1.2 mg/dL    ALT 8 7 - 45 U/L   Magnesium   Result Value Ref Range    Magnesium 1.64 1.60 - 2.40 mg/dL   B-Type Natriuretic Peptide   Result Value Ref Range     (H) 0 - 99 pg/mL   Troponin I, High Sensitivity, Initial   Result Value Ref Range    Troponin I, High Sensitivity 122 (HH) 0 - 13 ng/L   Troponin, High Sensitivity, 1 Hour   Result Value Ref Range    Troponin I, High Sensitivity 135 (HH) 0 - 13 ng/L   aPTT - baseline   Result Value Ref Range    aPTT 32 27 - 38 seconds   ACTIVATED CLOTTING TIME LOW   Result Value Ref Range    POCT Activated Clotting Time Low Range 300 (H) 83 - 199 sec   ACTIVATED CLOTTING TIME LOW   Result Value Ref Range    POCT Activated Clotting Time Low Range       XR chest 1 view    Result Date: 1/12/2025  STUDY: Chest Radiograph; 01/12/2025 5:42 AM INDICATION: Chest pain. COMPARISON: None. ACCESSION NUMBER(S):  [id number] ORDERING CLINICIAN: ABBI GAN TECHNIQUE:  Frontal chest was obtained at 05:41:00 hours. FINDINGS: The size of the cardiac silhouette is enlarged.  No overt edema, pleural effusion, pneumothorax, or consolidation.    No overt edema, pneumothorax, pleural effusion, or focal consolidation. Signed by John Morrison MD      Assessment /Plan    Assessment & Plan  ST elevation myocardial infarction (STEMI), unspecified artery (Multi)  Marsha Tapia 69 y.o female with PMH including breast cancer, GERD, gastric bypass, vitamin deficiency, HTN, HLD, T2DM with complaints of upper respiratory symptoms since Thursday was seen at urgent care prescribed abx and sent home.  The past few day she continued to feel worse with increased chest pain, nausea, vomiting, and fatigue.  This morning she called 911 d/t worsening chest pain, vomiting and shortness of breath.  Upon arrival to ED she was found to have ST elevation in anterior leads, STEMI alert activated she was given 324 ASA, 180 Brilinta , ntg, morphine and taken to Cath lab.   LHC revealed LAD: mid 100% thrombotic occlusion now s/p aspiration thrombectomy and PCI with Synergy 2.5 x 32 LIAM and was admitted to ICU for further observation.     NEURO:   - Serial neuro and pain assessments   - A-F Bundle  - PRN tylenol     CV: 1/12 STEMI: LHC revealed LAD: mid 100% thrombotic occlusion now s/p aspiration thrombectomy and PCI with Synergy 2.5 x 32 LIAM   #HTN, HLD  EKG upon arrival to ICU:  NSR 74 with anterior infarct  -Continuous EKG and NIBP monitoring  - Maintain MAP goal MAP >65   - Hold home antihypertensives  - Eplerenone x1 today given hypertension and MI  - Start Brilinta 90 mg bid today  - Increase home statin to 80 mg daily  - Start ASA tomorrow  - Cardiology consulted appreciate recs    PULM:    Presumed URI in setting of productive cough, rhonchi and wheezing  - RT consult  - Duo nebs scheduled and prn   - Monitor SpO2, goal >92%.   - Continue  Augmentin   - Respiratory Pannle    :    Urinary retention 450 on bladder scan  - Insert lala catheter for strict I/Os.    - Check renal function panel and replete electrolytes as needed.    GI:    - Bowel regimen- senokot and miralax  - Diet advance as tolerate    HEME:    - SCDs  for DVT prophylaxis     ENDO:    DMII  - Hold home metformin s/p cath  - ICU SSI and hypoglycemia     ID: Pneumonia vs URI vs Sinusitis  - Continue home Augmentin  - Trend WBC    Dispo: Admit to ICU      This critically ill patient continues to be at-risk for clinically significant deterioration / failure due to the above mentioned dysfunctional, unstable organ systems.  I have personally identified and managed all complex critical care issues with efforts to prevent aforementioned clinical deterioration.  Critical care time is spent at bedside and/or the immediate area and has included, but is not limited to, the review of diagnostic tests, labs, radiographs, serial assessments of hemodynamics, respiratory status, ventilatory management, and family updates.  Time spent in procedures and teaching are reported separately.    CRITICAL CARE TIME: 70 minutes    Sara Mc, APRN-CNP

## 2025-01-12 NOTE — ASSESSMENT & PLAN NOTE
Marsha Tapia 69 y.o female with PMH including breast cancer, GERD, gastric bypass, vitamin deficiency, HTN, HLD, T2DM with complaints of upper respiratory symptoms since Thursday was seen at urgent care prescribed abx and sent home.  The past few day she continued to feel worse with increased chest pain, nausea, vomiting, and fatigue.  This morning she called 911 d/t worsening chest pain, vomiting and shortness of breath.  Upon arrival to ED she was found to have ST elevation in anterior leads, STEMI alert activated she was given 324 ASA, 180 Brilinta , ntg, morphine and taken to Cath lab.   LHC revealed LAD: mid 100% thrombotic occlusion now s/p aspiration thrombectomy and PCI with Synergy 2.5 x 32 LIAM and was admitted to ICU for further observation.

## 2025-01-12 NOTE — Clinical Note
Angioplasty of the left anterior descending lesion. Inflation 1: Pressure = 12 justo; Duration = 10 sec. Inflation 2: Pressure = 12 justo; Duration = 7 sec. Door to balloon

## 2025-01-12 NOTE — POST-PROCEDURE NOTE
Physician Transition of Care Summary  Invasive Cardiovascular Lab    Procedure Date: 1/12/2025  Attending:    * Brett Chavez - Primary  Resident/Fellow/Other Assistant: Surgeons and Role:  * No surgeons found with a matching role *    Indications:   Pre-op Diagnosis      * STEMI (ST elevation myocardial infarction) (Multi) [I21.3]    Post-procedure diagnosis:   Post-op Diagnosis     * STEMI (ST elevation myocardial infarction) (Multi) [I21.3]    Procedure(s):   PCI    Left Heart Cath, No LV  [id number] - MD L HRT CATH W/NJX L VENTRICULOGRAPHY IMG S&I    Procedure Findings:   Right Radial 6 Fr -- TR Band  Right Femoral 6 Fr -- Angioseal  LM: no obstructive CAD   LAD: mid 100% thrombotic occlusion now s/p aspiration thrombectomy and PCI with Synergy 2.5 x 32 LIAM  LCX: mid 50% stenosis  RCA: no obstructive CAD    Description of the Procedure:   As above    Complications:   NA    Stents/Implants:   Implants       Stent    Stent, Synergy Xd, Mr Ramirez, 2.50 X 32mm - [id number] - Implanted        Inventory item: STENT, SYNERGY XD, MR RAMIREZ, 2.50 X 32MM Model/Cat number: [id number]    : [employer] Lot number: [id number]    Device identifier: [id number]        GUDID Information       Request status Successful        Brand name: SYNERGY™ XD Version/Model: [id number]    Company name: [employer] MRI safety info as of 1/12/25: MR Louise    Contains dry or latex rubber: No      GMDN P.T. name: Drug-eluting coronary artery stent, non-bioabsorbable-polymer-coated                As of 1/12/2025       Status: Implanted                              Anticoagulation/Antiplatelet Plan:   ASA/Ticagrelor for 1 year without interruption followed by lifelong ASA.     Estimated Blood Loss:   * No values recorded between 1/12/2025  7:04 AM and 1/12/2025  8:17 AM *    Anesthesia: Moderate Sedation Anesthesia Staff: No anesthesia staff entered.    Any Specimen(s) Removed:   No specimens  collected during this procedure.    Disposition:   ICU      Electronically signed by: Anahy Ramirez MD, 1/12/2025 8:17 AM

## 2025-01-12 NOTE — ED PROVIDER NOTES
Chief Complaint   Patient presents with    Chest Pain     Limitations to History: vomiting    HPI:   Marsha Tapia is an 69 y.o. female complicated PMH including breast cancer, GERD, gastric bypass, vitamin deficiency, HTN, HLD, T2DM who presents to the ED via EMS for evaluation of chest pain that began yesterday.  Localizes it to the middle of her chest.  Unable to tell me whether there are any aggravating or relieving factors.  Pain started after she began taking Augmentin for a sinus infection.  She is unsure whether she has had Augmentin in the past.  She endorses associated nausea, vomiting.  Denies hemoptysis.  Cannot provide any other history.    Medications:  Soc HX:  No Known Allergies: NKDA  Past Medical History:   Diagnosis Date    Breast cancer (Multi)     Hx antineoplastic chemo     Localized enlarged lymph nodes 06/22/2021    Axillary adenopathy    Personal history of irradiation     Personal history of malignant neoplasm of breast     History of malignant neoplasm of left breast     Past Surgical History:   Procedure Laterality Date    BREAST LUMPECTOMY      MR HEAD ANGIO WO IV CONTRAST  07/20/2021    MR HEAD ANGIO WO IV CONTRAST 7/20/2021 [facility]    MR NECK ANGIO WO IV CONTRAST  07/20/2021    MR NECK ANGIO WO IV CONTRAST 7/20/2021 [facility]    OTHER SURGICAL HISTORY  10/12/2020    Shoulder replacement    OTHER SURGICAL HISTORY  10/12/2020    Bariatric surgery    OTHER SURGICAL HISTORY  10/12/2020    Cataract surgery    OTHER SURGICAL HISTORY  10/12/2020    Hysterectomy    OTHER SURGICAL HISTORY  10/28/2022    Lumpectomy    OTHER SURGICAL HISTORY  09/30/2021    Cyst excision     No family history on file.     Physical Exam  Vitals and nursing note reviewed.   Constitutional:       General: She is not in acute distress.     Appearance: Normal appearance. She is ill-appearing. She is not toxic-appearing.      Comments: Elevated BMI.  Dry heaving clear sputum into emesis bag during  assessment   HENT:      Right Ear: External ear normal.      Left Ear: External ear normal.   Eyes:      Pupils: Pupils are equal, round, and reactive to light.   Cardiovascular:      Rate and Rhythm: Normal rate and regular rhythm.      Pulses: Normal pulses.           Radial pulses are 2+ on the right side and 2+ on the left side.        Dorsalis pedis pulses are 2+ on the right side and 2+ on the left side.      Heart sounds: Murmur heard.   Pulmonary:      Effort: Pulmonary effort is normal. No respiratory distress.      Breath sounds: Normal breath sounds.   Chest:      Chest wall: No mass or tenderness.   Abdominal:      General: Bowel sounds are normal.      Palpations: Abdomen is soft.      Tenderness: There is no abdominal tenderness. There is no guarding or rebound.      Comments: No CVA tenderness   Musculoskeletal:         General: No deformity or signs of injury. Normal range of motion.      Cervical back: Normal range of motion.   Skin:     General: Skin is warm and dry.      Findings: No bruising.   Neurological:      Mental Status: She is alert.      Cranial Nerves: No cranial nerve deficit.     VS: As documented in the triage note and EMR flowsheet from this visit were reviewed.    External Records Reviewed: I reviewed recent and relevant outside records including: Reviewed PCP note 1/9/2025.  Patient seen for productive cough, body aches, headaches and phlegm x 7 days.  Diagnosed with viral cough.  She was prescribed Augmentin and brompheniramine-pseudoephedrine  Reviewed echo 11/2024 which shows increased LVEF with EF 65 to 70%.  Also with aortic stenosis    EKG INTERPRETATION:      Personally Reviewed      Rhythm: Sinus bradycardia with PACs      Rate: 50 bpm      Axis: RAD      Intervals:  Normal TX interval 180 ms     QRS Complex: Low voltage      ST Segment: T wave inversion lead III with T wave flattening throughout and abnormal ST changes V2, V3  QT Interval: QTc 454 ms     Compared with  Prior: T wave changes are new, QTc has shortened    REPEAT EKG INTERPRETATION:      Personally Reviewed      Rhythm: STEMI      Rate: 50 bpm      Axis: Normal axis      Intervals:  Normal CO interval 170 ms      QRS Complex:  Normal      ST Segment: ST elevation anterior lateral leads     QT Interval:  Normal QTc 474 ms         Medical Decision Making:   ED Course as of 01/12/25 0629   Sun Jan 12, 2025   4305 Vitals Reviewed: Afebrile. Hypertensive. Not tachycardic nor tachypneic. No hypoxia.   [KA]   0525 Patient is 69-year-old female presents to the ED for evaluation of chest pain.  She is unable to provide good history because she starts dry heaving and spitting into emesis bag during my assessment.  She has no CVA tenderness.  No abdominal tenderness.  Chest pain is not reproducible to palpation.  Symmetric pulses.  Have initiated cardiac workup.  Patient's symptoms started after she began taking Augmentin.  Says she is unsure if she is taking this in the past.  At this time no rashes, shortness of breath, tongue or lip swelling to suggest anaphylaxis.  Patient heart score is a 5 without troponin for age, risk factors and T wave changes [KA]   0612 Personally reviewed labs.  Magnesium normal.  CBC shows hemoglobin 9.7 no white count.  BNP elevated at 217.  Creatinine normal.  Troponin elevated at 122.  Because of this I have asked for repeat ECG.  Went in to evaluate patient help nurse obtain ECG.  New ECG shows acute MI.  Patient still actively having chest pain.  We have activated STEMI alert.  Patient to be given aspirin, Brilinta, heparin bolus, nitrostat [KA]   0627 Patient signed out to attending pending discussion with cardiology and disposition to cath lab [KA]      ED Course User Index  [KA] Mimi Reed PA-C         Diagnoses as of 01/12/25 0629   ST elevation myocardial infarction (STEMI), unspecified artery (Multi)      Escalation of Care: Appropriate for hospitalization  Discussion of  Management with Other Providers:  I discussed the patient/results with: Attending Douglas    Counseling: Spoke with the patient and discussed today´s findings, in addition to providing specific details for the plan of care and expected course.  Patient was given the opportunity to ask questions.  Educated on the common potential side effects of medications prescribed.    The plan of care was mutually agreed upon with the patient. The patient and/or family were given the opportunity to ask questions. All questions asked today in the ED were answered to the best of my ability with today's information.    This patient was cared for in the setting of nationwide stress on resources and staffing.    This report was transcribed using voice recognition software.  Every effort was made to ensure accuracy, however, inadvertently computerized transcription errors may be present.       Mimi Reed PA-C  01/12/25 0629       Mimi Reed PA-C  01/13/25 0614

## 2025-01-12 NOTE — ED PROCEDURE NOTE
Procedure  Critical Care    Performed by: Denice Cole MD  Authorized by: Denice Cole MD    Critical care provider statement:     Critical care time (minutes):  55    Critical care time was exclusive of:  Separately billable procedures and treating other patients    Critical care was necessary to treat or prevent imminent or life-threatening deterioration of the following conditions:  Cardiac failure (STEMI)    Critical care was time spent personally by me on the following activities:  Blood draw for specimens, development of treatment plan with patient or surrogate, discussions with consultants, evaluation of patient's response to treatment, examination of patient, review of old charts, re-evaluation of patient's condition, pulse oximetry, ordering and review of radiographic studies, ordering and review of laboratory studies, ordering and performing treatments and interventions and obtaining history from patient or surrogate    Care discussed with: admitting provider                 Denice Cole MD  01/12/25 7419

## 2025-01-12 NOTE — Clinical Note
Angioplasty of the left anterior descending lesion. Inflation 1: Pressure = 22 justo; Duration = 20 sec. Inflation 2: Pressure = 24 justo; Duration = 15 sec. Inflation 3: Pressure = 26 justo; Duration = 12 sec.

## 2025-01-12 NOTE — CARE PLAN
Problem: Pain - Adult  Goal: Verbalizes/displays adequate comfort level or baseline comfort level  Outcome: Progressing     Problem: Safety - Adult  Goal: Free from fall injury  Outcome: Progressing     Problem: Respiratory  Goal: Clear secretions with interventions this shift  Outcome: Progressing  Goal: Minimize anxiety/maximize coping throughout shift  Outcome: Progressing  Goal: Minimal/no exertional discomfort or dyspnea this shift  Outcome: Progressing  Goal: No signs of respiratory distress (eg. Use of accessory muscles. Peds grunting)  Outcome: Progressing    The clinical goals for the shift include Patient will be hemodynamically stable    Over the shift, the patient complains about nausea and abdominal pain. Per orders Chewable Simethicone and I/V Zofran given.Ambulation encouraged. Safety maintained

## 2025-01-13 VITALS
BODY MASS INDEX: 28.68 KG/M2 | HEART RATE: 50 BPM | WEIGHT: 167.11 LBS | OXYGEN SATURATION: 94 % | RESPIRATION RATE: 20 BRPM | SYSTOLIC BLOOD PRESSURE: 149 MMHG | TEMPERATURE: 98.3 F | DIASTOLIC BLOOD PRESSURE: 53 MMHG

## 2025-01-13 DIAGNOSIS — I21.3 ST ELEVATION MYOCARDIAL INFARCTION (STEMI), UNSPECIFIED ARTERY (MULTI): Primary | ICD-10-CM

## 2025-01-13 DIAGNOSIS — E11.65 TYPE 2 DIABETES MELLITUS WITH HYPERGLYCEMIA, WITHOUT LONG-TERM CURRENT USE OF INSULIN: ICD-10-CM

## 2025-01-13 LAB
ALBUMIN SERPL BCP-MCNC: 3 G/DL (ref 3.4–5)
ANION GAP SERPL CALC-SCNC: 7 MMOL/L (ref 10–20)
ATRIAL RATE: 50 BPM
BUN SERPL-MCNC: 10 MG/DL (ref 6–23)
CALCIUM SERPL-MCNC: 7.6 MG/DL (ref 8.6–10.3)
CARDIAC TROPONIN I PNL SERPL HS: ABNORMAL NG/L (ref 0–13)
CARDIAC TROPONIN I PNL SERPL HS: ABNORMAL NG/L (ref 0–13)
CHLORIDE SERPL-SCNC: 108 MMOL/L (ref 98–107)
CO2 SERPL-SCNC: 24 MMOL/L (ref 21–32)
CREAT SERPL-MCNC: 0.67 MG/DL (ref 0.5–1.05)
EGFRCR SERPLBLD CKD-EPI 2021: >90 ML/MIN/1.73M*2
ERYTHROCYTE [DISTWIDTH] IN BLOOD BY AUTOMATED COUNT: 18.4 % (ref 11.5–14.5)
FLUAV RNA RESP QL NAA+PROBE: NOT DETECTED
FLUBV RNA RESP QL NAA+PROBE: NOT DETECTED
GLUCOSE BLD MANUAL STRIP-MCNC: 108 MG/DL (ref 74–99)
GLUCOSE BLD MANUAL STRIP-MCNC: 114 MG/DL (ref 74–99)
GLUCOSE BLD MANUAL STRIP-MCNC: 128 MG/DL (ref 74–99)
GLUCOSE BLD MANUAL STRIP-MCNC: 132 MG/DL (ref 74–99)
GLUCOSE BLD MANUAL STRIP-MCNC: 133 MG/DL (ref 74–99)
GLUCOSE BLD MANUAL STRIP-MCNC: 158 MG/DL (ref 74–99)
GLUCOSE BLD MANUAL STRIP-MCNC: 171 MG/DL (ref 74–99)
GLUCOSE SERPL-MCNC: 129 MG/DL (ref 74–99)
HCT VFR BLD AUTO: 29.6 % (ref 36–46)
HGB BLD-MCNC: 9.1 G/DL (ref 12–16)
MAGNESIUM SERPL-MCNC: 1.91 MG/DL (ref 1.6–2.4)
MCH RBC QN AUTO: 23.9 PG (ref 26–34)
MCHC RBC AUTO-ENTMCNC: 30.7 G/DL (ref 32–36)
MCV RBC AUTO: 78 FL (ref 80–100)
NRBC BLD-RTO: 0 /100 WBCS (ref 0–0)
P AXIS: -17 DEGREES
P OFFSET: 194 MS
P ONSET: 126 MS
PHOSPHATE SERPL-MCNC: 3.3 MG/DL (ref 2.5–4.9)
PLATELET # BLD AUTO: 214 X10*3/UL (ref 150–450)
POTASSIUM SERPL-SCNC: 3.4 MMOL/L (ref 3.5–5.3)
PR INTERVAL: 180 MS
Q ONSET: 216 MS
QRS COUNT: 8 BEATS
QRS DURATION: 74 MS
QT INTERVAL: 498 MS
QTC CALCULATION(BAZETT): 454 MS
QTC FREDERICIA: 468 MS
R AXIS: -6 DEGREES
RBC # BLD AUTO: 3.81 X10*6/UL (ref 4–5.2)
RSV RNA RESP QL NAA+PROBE: DETECTED
SARS-COV-2 RNA RESP QL NAA+PROBE: DETECTED
SODIUM SERPL-SCNC: 136 MMOL/L (ref 136–145)
T AXIS: -17 DEGREES
T OFFSET: 465 MS
VENTRICULAR RATE: 50 BPM
WBC # BLD AUTO: 9 X10*3/UL (ref 4.4–11.3)

## 2025-01-13 PROCEDURE — RXMED WILLOW AMBULATORY MEDICATION CHARGE

## 2025-01-13 PROCEDURE — 2060000001 HC INTERMEDIATE ICU ROOM DAILY

## 2025-01-13 PROCEDURE — 85027 COMPLETE CBC AUTOMATED: CPT | Performed by: STUDENT IN AN ORGANIZED HEALTH CARE EDUCATION/TRAINING PROGRAM

## 2025-01-13 PROCEDURE — 2500000001 HC RX 250 WO HCPCS SELF ADMINISTERED DRUGS (ALT 637 FOR MEDICARE OP): Performed by: STUDENT IN AN ORGANIZED HEALTH CARE EDUCATION/TRAINING PROGRAM

## 2025-01-13 PROCEDURE — 2500000002 HC RX 250 W HCPCS SELF ADMINISTERED DRUGS (ALT 637 FOR MEDICARE OP, ALT 636 FOR OP/ED): Performed by: STUDENT IN AN ORGANIZED HEALTH CARE EDUCATION/TRAINING PROGRAM

## 2025-01-13 PROCEDURE — 2500000002 HC RX 250 W HCPCS SELF ADMINISTERED DRUGS (ALT 637 FOR MEDICARE OP, ALT 636 FOR OP/ED)

## 2025-01-13 PROCEDURE — 99232 SBSQ HOSP IP/OBS MODERATE 35: CPT | Performed by: NURSE PRACTITIONER

## 2025-01-13 PROCEDURE — 84484 ASSAY OF TROPONIN QUANT: CPT | Performed by: NURSE PRACTITIONER

## 2025-01-13 PROCEDURE — 37799 UNLISTED PX VASCULAR SURGERY: CPT | Performed by: NURSE PRACTITIONER

## 2025-01-13 PROCEDURE — 2500000001 HC RX 250 WO HCPCS SELF ADMINISTERED DRUGS (ALT 637 FOR MEDICARE OP): Performed by: NURSE PRACTITIONER

## 2025-01-13 PROCEDURE — 82947 ASSAY GLUCOSE BLOOD QUANT: CPT

## 2025-01-13 PROCEDURE — 80069 RENAL FUNCTION PANEL: CPT | Performed by: STUDENT IN AN ORGANIZED HEALTH CARE EDUCATION/TRAINING PROGRAM

## 2025-01-13 PROCEDURE — 99291 CRITICAL CARE FIRST HOUR: CPT | Performed by: STUDENT IN AN ORGANIZED HEALTH CARE EDUCATION/TRAINING PROGRAM

## 2025-01-13 PROCEDURE — 2500000002 HC RX 250 W HCPCS SELF ADMINISTERED DRUGS (ALT 637 FOR MEDICARE OP, ALT 636 FOR OP/ED): Performed by: NURSE PRACTITIONER

## 2025-01-13 PROCEDURE — 2500000001 HC RX 250 WO HCPCS SELF ADMINISTERED DRUGS (ALT 637 FOR MEDICARE OP)

## 2025-01-13 PROCEDURE — 94640 AIRWAY INHALATION TREATMENT: CPT

## 2025-01-13 PROCEDURE — 87637 SARSCOV2&INF A&B&RSV AMP PRB: CPT | Performed by: NURSE PRACTITIONER

## 2025-01-13 PROCEDURE — 83735 ASSAY OF MAGNESIUM: CPT | Performed by: STUDENT IN AN ORGANIZED HEALTH CARE EDUCATION/TRAINING PROGRAM

## 2025-01-13 PROCEDURE — 99232 SBSQ HOSP IP/OBS MODERATE 35: CPT | Performed by: INTERNAL MEDICINE

## 2025-01-13 PROCEDURE — 2500000004 HC RX 250 GENERAL PHARMACY W/ HCPCS (ALT 636 FOR OP/ED): Performed by: NURSE PRACTITIONER

## 2025-01-13 RX ORDER — ASPIRIN 81 MG/1
81 TABLET ORAL DAILY
Status: DISCONTINUED | OUTPATIENT
Start: 2025-01-13 | End: 2025-01-15 | Stop reason: HOSPADM

## 2025-01-13 RX ORDER — GUAIFENESIN 600 MG/1
600 TABLET, EXTENDED RELEASE ORAL 2 TIMES DAILY PRN
Status: DISCONTINUED | OUTPATIENT
Start: 2025-01-13 | End: 2025-01-15 | Stop reason: HOSPADM

## 2025-01-13 RX ORDER — ALBUTEROL SULFATE 90 UG/1
2 INHALANT RESPIRATORY (INHALATION) EVERY 2 HOUR PRN
Status: DISCONTINUED | OUTPATIENT
Start: 2025-01-13 | End: 2025-01-15 | Stop reason: HOSPADM

## 2025-01-13 RX ORDER — ENOXAPARIN SODIUM 100 MG/ML
40 INJECTION SUBCUTANEOUS EVERY 24 HOURS
Status: DISCONTINUED | OUTPATIENT
Start: 2025-01-13 | End: 2025-01-15 | Stop reason: HOSPADM

## 2025-01-13 RX ORDER — ASPIRIN 81 MG/1
81 TABLET ORAL DAILY
Qty: 30 TABLET | Refills: 11 | Status: SHIPPED | OUTPATIENT
Start: 2025-01-14 | End: 2026-01-14

## 2025-01-13 RX ORDER — LISINOPRIL 20 MG/1
40 TABLET ORAL DAILY
Status: DISCONTINUED | OUTPATIENT
Start: 2025-01-14 | End: 2025-01-15 | Stop reason: HOSPADM

## 2025-01-13 RX ORDER — ATORVASTATIN CALCIUM 80 MG/1
80 TABLET, FILM COATED ORAL DAILY
Qty: 30 TABLET | Refills: 11 | Status: SHIPPED | OUTPATIENT
Start: 2025-01-14 | End: 2026-01-14

## 2025-01-13 RX ORDER — POTASSIUM CHLORIDE 20 MEQ/1
20 TABLET, EXTENDED RELEASE ORAL ONCE
Status: COMPLETED | OUTPATIENT
Start: 2025-01-13 | End: 2025-01-13

## 2025-01-13 RX ADMIN — POLYETHYLENE GLYCOL 3350 17 G: 17 POWDER, FOR SOLUTION ORAL at 08:53

## 2025-01-13 RX ADMIN — TICAGRELOR 90 MG: 90 TABLET ORAL at 20:22

## 2025-01-13 RX ADMIN — BENZOCAINE AND MENTHOL 1 LOZENGE: 15; 3.6 LOZENGE ORAL at 12:19

## 2025-01-13 RX ADMIN — SIMETHICONE 160 MG: 80 TABLET, CHEWABLE ORAL at 12:20

## 2025-01-13 RX ADMIN — BENZOCAINE AND MENTHOL 1 LOZENGE: 15; 3.6 LOZENGE ORAL at 17:13

## 2025-01-13 RX ADMIN — POTASSIUM CHLORIDE 20 MEQ: 1500 TABLET, EXTENDED RELEASE ORAL at 08:49

## 2025-01-13 RX ADMIN — SENNOSIDES AND DOCUSATE SODIUM 1 TABLET: 50; 8.6 TABLET ORAL at 20:22

## 2025-01-13 RX ADMIN — SIMETHICONE 160 MG: 80 TABLET, CHEWABLE ORAL at 20:22

## 2025-01-13 RX ADMIN — AMOXICILLIN AND CLAVULANATE POTASSIUM 1 TABLET: 875; 125 TABLET, FILM COATED ORAL at 08:49

## 2025-01-13 RX ADMIN — SIMETHICONE 160 MG: 80 TABLET, CHEWABLE ORAL at 17:13

## 2025-01-13 RX ADMIN — IPRATROPIUM BROMIDE AND ALBUTEROL SULFATE 3 ML: 2.5; .5 SOLUTION RESPIRATORY (INHALATION) at 07:30

## 2025-01-13 RX ADMIN — ENOXAPARIN SODIUM 40 MG: 40 INJECTION SUBCUTANEOUS at 11:48

## 2025-01-13 RX ADMIN — ATORVASTATIN CALCIUM 80 MG: 80 TABLET, FILM COATED ORAL at 08:50

## 2025-01-13 RX ADMIN — LISINOPRIL 20 MG: 20 TABLET ORAL at 08:50

## 2025-01-13 RX ADMIN — SIMETHICONE 160 MG: 80 TABLET, CHEWABLE ORAL at 08:50

## 2025-01-13 RX ADMIN — ASPIRIN 81 MG: 81 TABLET, COATED ORAL at 08:50

## 2025-01-13 RX ADMIN — TICAGRELOR 90 MG: 90 TABLET ORAL at 08:50

## 2025-01-13 ASSESSMENT — PAIN - FUNCTIONAL ASSESSMENT
PAIN_FUNCTIONAL_ASSESSMENT: 0-10

## 2025-01-13 ASSESSMENT — COGNITIVE AND FUNCTIONAL STATUS - GENERAL
DRESSING REGULAR UPPER BODY CLOTHING: A LITTLE
TOILETING: A LITTLE
STANDING UP FROM CHAIR USING ARMS: A LITTLE
MOVING FROM LYING ON BACK TO SITTING ON SIDE OF FLAT BED WITH BEDRAILS: A LITTLE
DRESSING REGULAR LOWER BODY CLOTHING: A LITTLE
MOVING TO AND FROM BED TO CHAIR: A LITTLE
TURNING FROM BACK TO SIDE WHILE IN FLAT BAD: A LITTLE
WALKING IN HOSPITAL ROOM: A LITTLE
DAILY ACTIVITIY SCORE: 20
MOBILITY SCORE: 17
CLIMB 3 TO 5 STEPS WITH RAILING: A LOT
HELP NEEDED FOR BATHING: A LITTLE

## 2025-01-13 ASSESSMENT — PAIN SCALES - GENERAL
PAINLEVEL_OUTOF10: 0 - NO PAIN

## 2025-01-13 ASSESSMENT — ACTIVITIES OF DAILY LIVING (ADL): LACK_OF_TRANSPORTATION: NO

## 2025-01-13 NOTE — NURSING NOTE
Patient ID band will not allow accu check , but allows med administration. No resources available at this time who knows how to fix it.     Blood glucose  01/12/2024 2017  Results 158

## 2025-01-13 NOTE — SIGNIFICANT EVENT
"Discussed abnormal CT finding with vascular surgery (Dr. Kaitlyn Dunphy).  Unable to do radial approach in cath lab.  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 There is apparent stenosis of the right subclavian artery and vein, likely due to compression between the clavicle and rib\"     Dr. Dunphy reviewed imaging; said with these cases the most concerning thing is to evaluate for aneurysm, which is not seen on her imaging. She recommends follow up with herself in 3 months as an outpatient.  She will need periodic serial CT scans.      "

## 2025-01-13 NOTE — PROGRESS NOTES
01/13/25 1016   Discharge Planning   Living Arrangements Alone   Support Systems Family members   Assistance Needed I met with patient in room. She lives alone in an apt. She has pcp dr. king; drives to [employer]. She has no hhc, and cares for self, ambulates without assistive devices. HNN   Type of Residence Private residence   Number of Stairs to Enter Residence 5   Number of Stairs Within Residence 0   Do you have animals or pets at home? No   Home or Post Acute Services None   Expected Discharge Disposition Home   Financial Resource Strain   How hard is it for you to pay for the very basics like food, housing, medical care, and heating? Not hard   Housing Stability   In the last 12 months, was there a time when you were not able to pay the mortgage or rent on time? N   In the past 12 months, how many times have you moved where you were living? 1   At any time in the past 12 months, were you homeless or living in a shelter (including now)? N   Transportation Needs   In the past 12 months, has lack of transportation kept you from medical appointments or from getting medications? no   In the past 12 months, has lack of transportation kept you from meetings, work, or from getting things needed for daily living? No     Marsha Tapia is a 69 y.o. female on day 1 of admission presenting with ST elevation myocardial infarction (STEMI), unspecified artery (Multi).    Carolyne Qureshi RN

## 2025-01-13 NOTE — PROGRESS NOTES
Marsha Tapia is a 69 y.o. female on day 1 of admission presenting with ST elevation myocardial infarction (STEMI), unspecified artery (Multi).    Subjective     Seen this afternoon.  Patient currently cleaning up in the bathroom.  Denies chest pain or shortness of breath.    Ongoing cough.  On RA.     Objective     Physical Exam    Constitutional: NAD, pt alert and cooperative  Extremities: normal extremities, no edema  Neurological: A&O x 3, speech clear, follows commands appropriately, cr. n. grossly intact, sensation grossly intact, motor 5/5 throughout  Skin: Warm and dry, no lesions, no rashes, right radial site and right groin site C/D/I without erythema, swelling, ecchymosis   Psych: calm, stable mood      Last Recorded Vitals  Blood pressure 132/56, pulse 54, temperature 36.1 °C (97 °F), temperature source Temporal, resp. rate 16, weight 75.8 kg (167 lb 1.7 oz), SpO2 98%.  Intake/Output last 3 Shifts:  I/O last 3 completed shifts:  In: 450 (5.9 mL/kg) [P.O.:400; I.V.:50 (0.7 mL/kg)]  Out: 1885 (24.9 mL/kg) [Urine:1875 (0.7 mL/kg/hr); Blood:10]  Weight: 75.8 kg     Relevant Results            This patient currently has cardiac telemetry ordered; if you would like to modify or discontinue the telemetry order, click here to go to the orders activity to modify/discontinue the order.      Scheduled medications  [Held by provider] amLODIPine, 10 mg, oral, Daily   And  [Held by provider] lisinopril, 40 mg, oral, Daily  amoxicillin-pot clavulanate, 1 tablet, oral, q12h TEN  aspirin, 81 mg, oral, Daily  atorvastatin, 80 mg, oral, Daily  enoxaparin, 40 mg, subcutaneous, q24h  insulin lispro, 0-5 Units, subcutaneous, TID AC  [START ON 1/14/2025] lisinopril, 40 mg, oral, Daily  perflutren lipid microspheres, 0.5-10 mL of dilution, intravenous, Once in imaging  perflutren protein A microsphere, 0.5 mL, intravenous, Once in imaging  polyethylene glycol, 17 g, oral, Daily  sennosides-docusate sodium, 1 tablet, oral,  Nightly  simethicone, 160 mg, oral, 4x daily  sulfur hexafluoride microsphr, 2 mL, intravenous, Once in imaging  ticagrelor, 90 mg, oral, BID      Continuous medications     PRN medications  PRN medications: acetaminophen, albuterol, benzocaine-menthol, dextrose, dextrose, fluticasone, glucagon, glucagon, guaiFENesin, nitroglycerin, ondansetron     Results for orders placed or performed during the hospital encounter of 01/12/25 (from the past 24 hours)   Drug Screen, Urine   Result Value Ref Range    Amphetamine Screen, Urine Presumptive Negative Presumptive Negative    Barbiturate Screen, Urine Presumptive Negative Presumptive Negative    Benzodiazepines Screen, Urine Presumptive Positive (A) Presumptive Negative    Cannabinoid Screen, Urine Presumptive Negative Presumptive Negative    Cocaine Metabolite Screen, Urine Presumptive Negative Presumptive Negative    Fentanyl Screen, Urine Presumptive Positive (A) Presumptive Negative    Opiate Screen, Urine Presumptive Positive (A) Presumptive Negative    Oxycodone Screen, Urine Presumptive Negative Presumptive Negative    PCP Screen, Urine Presumptive Negative Presumptive Negative    Methadone Screen, Urine Presumptive Negative Presumptive Negative   Urinalysis with Reflex Culture and Microscopic   Result Value Ref Range    Color, Urine Light-Orange (N) Light-Yellow, Yellow, Dark-Yellow    Appearance, Urine Clear Clear    Specific Gravity, Urine >1.050 (N) 1.005 - 1.035    pH, Urine 5.5 5.0, 5.5, 6.0, 6.5, 7.0, 7.5, 8.0    Protein, Urine 50 (1+) (A) NEGATIVE, 10 (TRACE), 20 (TRACE) mg/dL    Glucose, Urine Normal Normal mg/dL    Blood, Urine OVER (3+) (A) NEGATIVE    Ketones, Urine NEGATIVE NEGATIVE mg/dL    Bilirubin, Urine NEGATIVE NEGATIVE    Urobilinogen, Urine Normal Normal mg/dL    Nitrite, Urine NEGATIVE NEGATIVE    Leukocyte Esterase, Urine NEGATIVE NEGATIVE   Urinalysis Microscopic   Result Value Ref Range    WBC, Urine NONE 1-5, NONE /HPF    RBC, Urine >20  (A) NONE, 1-2, 3-5 /HPF    Squamous Epithelial Cells, Urine 1-9 (SPARSE) Reference range not established. /HPF   Troponin I, High Sensitivity   Result Value Ref Range    Troponin I, High Sensitivity 69,505 (HH) 0 - 13 ng/L   POCT GLUCOSE   Result Value Ref Range    POCT Glucose 158 (H) 74 - 99 mg/dL   Troponin I, High Sensitivity   Result Value Ref Range    Troponin I, High Sensitivity 78,957 (HH) 0 - 13 ng/L   Troponin I, High Sensitivity   Result Value Ref Range    Troponin I, High Sensitivity 61,834 (HH) 0 - 13 ng/L   Renal Function Panel   Result Value Ref Range    Glucose 129 (H) 74 - 99 mg/dL    Sodium 136 136 - 145 mmol/L    Potassium 3.4 (L) 3.5 - 5.3 mmol/L    Chloride 108 (H) 98 - 107 mmol/L    Bicarbonate 24 21 - 32 mmol/L    Anion Gap 7 (L) 10 - 20 mmol/L    Urea Nitrogen 10 6 - 23 mg/dL    Creatinine 0.67 0.50 - 1.05 mg/dL    eGFR >90 >60 mL/min/1.73m*2    Calcium 7.6 (L) 8.6 - 10.3 mg/dL    Phosphorus 3.3 2.5 - 4.9 mg/dL    Albumin 3.0 (L) 3.4 - 5.0 g/dL   Magnesium   Result Value Ref Range    Magnesium 1.91 1.60 - 2.40 mg/dL   POCT GLUCOSE   Result Value Ref Range    POCT Glucose 133 (H) 74 - 99 mg/dL   CBC   Result Value Ref Range    WBC 9.0 4.4 - 11.3 x10*3/uL    nRBC 0.0 0.0 - 0.0 /100 WBCs    RBC 3.81 (L) 4.00 - 5.20 x10*6/uL    Hemoglobin 9.1 (L) 12.0 - 16.0 g/dL    Hematocrit 29.6 (L) 36.0 - 46.0 %    MCV 78 (L) 80 - 100 fL    MCH 23.9 (L) 26.0 - 34.0 pg    MCHC 30.7 (L) 32.0 - 36.0 g/dL    RDW 18.4 (H) 11.5 - 14.5 %    Platelets 214 150 - 450 x10*3/uL   POCT GLUCOSE   Result Value Ref Range    POCT Glucose 128 (H) 74 - 99 mg/dL   Sars-CoV-2 PCR   Result Value Ref Range    Coronavirus 2019, PCR Detected (A) Not Detected   Influenza A, and B PCR   Result Value Ref Range    Flu A Result Not Detected Not Detected    Flu B Result Not Detected Not Detected   RSV PCR   Result Value Ref Range    RSV PCR Detected (A) Not Detected   POCT GLUCOSE   Result Value Ref Range    POCT Glucose 114 (H) 74 - 99  mg/dL     CT angio chest abdomen pelvis    Result Date: 1/13/2025  Interpreted By:  Enzo Meraz, STUDY: CT ANGIO CHEST ABDOMEN PELVIS;  1/12/2025 12:46 pm   INDICATION: Signs/Symptoms:arterio lusoria concern.   COMPARISON: CT abdomen and pelvis with contrast from may 11, 2021 CT of the chest with contrast from Kym 15, 2021   ACCESSION NUMBER(S): [id number]   ORDERING CLINICIAN: KAILEY BEVERLY   TECHNIQUE: Axial non-contrast images of the chest, abdomen, and pelvis with coronal and sagittal reformatted images. Axial CT images of the chest, abdomen and pelvis after the intravenous administration of 90 mL of Omnipaque 350 using angiographic technique with coronal and sagittal reformatted images. MIP images were provided and reviewed. 3D reconstructions were created on a separate independent workstation and reviewed.   FINDINGS: VASCULATURE:   PULMONARY ARTERIES: Main pulmonary artery is enlarged measuring up to 4.3 cm in greatest transverse dimension. No acute pulmonary embolism.   THORACIC AORTA: Non-contrast images show no evidence of acute intramural hematoma. No thoracic aortic aneurysm or dissection. Minor thoracic aortic atherosclerosis. Again seen is an aberrant right subclavian artery coursing behind the esophagus. This vessel contains calcific disease without significant stenosis. The included proximal segments of the other great vessels are patent. Compression of the right subclavian arterial structures is noted as it courses between the clavicle and right 1st rib.   ABDOMINAL AORTA: No abdominal aortic aneurysm or dissection. Contains diffuse partially calcified abdominal aortic atherosclerosis without significant stenosis.   ABDOMINAL AND PELVIC ARTERIES: Proximal celiac artery demonstrates downward displacement with resultant severe stenosis. Its major branch vessels are patent. The SMA is widely patent and supplies a replaced hepatic artery. The right renal artery and ANA are patent. The left renal  artery origin contains calcific disease resulting in moderate stenosis. Pelvic vascularity demonstrates diffuse calcific disease which results in moderate stenosis of the bilateral internal iliac arteries and mild stenosis of the bilateral common femoral arteries.     CT CHEST:   MEDIASTINUM AND LYMPH NODES: Again seen is a heterogeneous partially calcified nodule in the left lobe of the thyroid gland measuring up to 1.9 cm. The right lobe of the thyroid gland is unremarkable. Enlarged right paratracheal lymph node measures up to 1.1 cm in short axis. Additional smaller lymph nodes are seen throughout the mediastinum. No pneumomediastinum.   HEART: The heart is enlarged. There is coronary artery calcifications present. There is an endovascular stent within the LAD. Aortic and mitral annular calcification is present.  No significant pericardial effusion.   LUNG, PLEURA, LARGE AIRWAYS: The central airways are patent. The small airways leading to the lower lobes demonstrate mild peribronchial thickening. There is interlobular septal thickening and bibasilar atelectasis. There is no consolidation or pleural effusion, or pneumothorax.   OSSEOUS STRUCTURES/CHEST WALL: There is a right shoulder arthroplasty. There are degenerative changes of the spine. No acute osseous abnormality. Again seen is asymmetric skin thickening of the left breast. Retro areolar soft tissue density is again seen, which now contains a punctate calcification. Correlate with any prior mammographic studies.     CT ABDOMEN/PELVIS:   LIVER: The liver is enlarged, but without focal parenchymal abnormality.   BILE DUCTS: No significant intrahepatic or extrahepatic dilatation.   GALLBLADDER: Contains calcified gallstones without findings of cholecystitis.   PANCREAS: There is fatty atrophy of the pancreas.   SPLEEN: No significant abnormality.   ADRENALS: No significant abnormality.   KIDNEYS, URETERS, BLADDER: There is a punctate cyst in the lower pole  of the left kidney. There is no hydroureteronephrosis. There is a Dumont catheter balloon around a collapsed urinary bladder containing gas.   REPRODUCTIVE ORGANS: No significant abnormality.   VESSELS: (See above). No additional significant abnormality.   RETROPERITONEUM/LYMPH NODES: No enlarged lymph nodes.   BOWEL/MESENTERY/PERITONEUM: No inflammatory bowel wall thickening or dilatation. Status post gastric bypass. Normal appendix.   No significant ascites, free air, or fluid collection.     ABDOMINAL WALL: No significant abnormality. Skin defect over the right groin, related to recent cardiac catheterization.   OSSEOUS STRUCTURES: Degenerative changes of the spine. Stable sclerotic lesion involving the right iliac crest with nonaggressive features, likely a bone island. There are degenerative changes of the hips. No acute osseous abnormality.       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 There is apparent stenosis of the right subclavian artery and vein, likely due to compression between the clavicle and rib.   Similar appearance of left breast skin thickening and a 1.9 cm retroareolar nodule that now contains a punctate calcification. Correlate with any prior mammographic studies to exclude inflammatory neoplasm.   Enlarged right paratracheal lymph node as well as mildly prominent mediastinal lymph nodes are indeterminate may be reactive or neoplastic.   Cardiomegaly with coronary artery calcifications and a stent within the LAD.   Findings of mild pulmonary vascular congestion within the lungs.   Additional stable chronic findings, as above.   Signed by: Enzo Meraz 1/13/2025 11:06 AM Dictation workstation:   [id number]    ECG 12 lead    Result Date: 1/13/2025  Normal sinus rhythm with sinus arrhythmia Anterior infarct (cited on or before 12-JAN-2025) T wave abnormality, consider lateral ischemia Prolonged QT Abnormal ECG When compared with ECG of 12-JAN-2025  09:19, (unconfirmed) No significant change was found    ECG 12 lead    Result Date: 1/13/2025  Sinus bradycardia with Premature atrial complexes Low voltage QRS Possible Inferior infarct , age undetermined Abnormal ECG When compared with ECG of 08-JUL-2024 23:37, Premature atrial complexes are now Present Borderline criteria for Inferior infarct are now Present ST elevation now present in Anterolateral leads T wave inversion no longer evident in Lateral leads QT has shortened    Cardiac Catheterization Procedure    Result Date: 1/12/2025   [facility], Cath Lab, [address] [place] [address] Cardiovascular Catheterization Report Patient Name:      YARA GUZMAN         Performing Physician: [id number] Chavez MD Study Date:        1/12/2025            Verifying Physician:  [id number] Chavez MD MRN/PID:           [id number] Accession#:        [id number]         Ordering Provider:    [id number] BROOKLYN CHAVEZ Date of Birth/Age: 1955 / 69 years Cardiologist:         [id number] Srinivas Perez MD Gender:            F                    Fellow:               [id number] Anahy Ramirez MD Encounter#:        [id number]  Study: Left Heart Cath with PCI Intensivist: [id number] Autumn Ackerman MD  Indications: YARA GUZMAN is a 70 year old female who presents with dyslipidemia, hypertension and diabetes. YARA GUZMAN is a 70 year old female who presents with dyslipidemia, hypertension, diabetes and a chest pain assessment of typical angina. Acute coronary syndrome - STEMI. Objective evidence of ischemia, andacute MI. Medical History: Stress test performed: No. CTA performed: Jacklyn Luong  accessed: No. LVEF Assessed: No. Cardiac arrest: No. Cardiac surgical consult: No. Cardiovascular instability: No. Frailty status of patient entering lab: 3 = Managing well.  Procedure Description: After infiltration with 2% Lidocaine, the right radial artery was cannulated with a modified Seldinger technique. Subsequently a 6 Cymraes sheath was placed in the right radial artery. After infiltration with 2% Lidocaine, a second arterial access was obtained via the right femoral artery with a modified Seldinger technique and a 6 Cymraes sheath was placed. This second arterial access was obtained to allow for unable to obtain wire access to the right subclavian likely due to arteria lusoria. Femoral artery angiography was performed at the additional arterial access site. This demonstrated a common femoral artery puncture appropriate for closure. An Angio-Seal Evolution 6F ([employer]) vascular closure device was placed per protocol.  Coronary Angiography: The coronary circulation is co-dominant.  Left Main Coronary Artery: The left main coronary artery is a normal caliber vessel. The left main arises normally from the left coronary sinus of Valsalva and bifurcates into the LAD and circumflex coronary arteries. The left main coronary artery showed no significant disease or stenosis greater than 30%.  Left Anterior Descending Coronary Artery Distribution: The mid left anterior descending coronary artery showed 100% stenosis. This lesion was thrombotic. The 1st diagonal branch showed no significant disease or stenosis greater than 30%.  Circumflex Coronary Artery Distribution: The circumflex coronary artery is a normal caliber vessel. The circumflex arises normally from the left main coronary artery and terminates in the AV groove. The mid circumflex coronary artery showed 50% stenosis. The 1st obtuse marginal branch is a small caliber vessel. The 1st obtuse marginal branch showed no significant disease or stenosis  greater than 30%. The 2nd obtuse marginal branch is a small caliber vessel. The 2nd obtuse marginal branch demonstrated no significant disease or stenosis greater than 30%.  Right Coronary Artery Distribution: The right coronary artery is a normal caliber vessel. The RCA arises normally from the right sinus of Valsalva. The RCA showed no significant disease or stenosis greater than 30%. The acute marginal branch showed no significant disease or stenosis greater than 30%. The right posterolateral branch showed no significant disease or stenosis greater than 30%. The right posterior descending artery showed no significant disease or stenosis greater than 30%.  Coronary Interventions: Angiography reveals a 100% stenosis of the mid left anterior descending coronary artery. Pre-intervention JEANCARLOS flow was 0. Percutaneous coronary intervention was performed within the mid left anterior descending. The vessel was pre-dilated using a compliant balloon 2.0 mm x 12 mm at 12 ELLI. Synergy 2.5 mm x 32 mm was advanced to the lesion and implanted at 20 ELLI. The stent was post dilated using a non-compliant balloon 2.75 mm x 15 mm at 18-24 ELLI. The stenosis was successfully reduced from 100% to 0%. Post-intervention JEANCARLOS flow was 3. Heparin was given and therapeutic ACTs were obtained for the entirity of the procedure. Patient had been loaded with ASA and Ticagrelor. The LM was engaged using a 6 Fr IKARI 3.5 guide catheter. A RunThrough was placed into the distal LAD. Initial pre-dilation was performed using a 2.0 x 12 Euphora SC at 12 elli. Two boluses of Integrelin were given. Subsequent angiogram showed JEANCARLOS 3 flow but residual thrombus in the mid LAD. Penumbra aspiration catheter was advanced and 2 runs of aspiration were performed. A 2.5 x 32 Synergy LIAM was then deployed at 20 elli. OCT demonstrated mild malapposition without any proximal or distal edge dissections. Post-dilation was performed using a 2.75 x 15 NC Euphora at  18-24 justo with care taken to avoid the distal most edge of the stented segment. Final angiograms showed JEANCARLOS 3 flow without any residual stenosis, guide dissection, or distal wire perforation. Equipment was removed and the femoral arteriotomy was closed with an Angioseal.  Coronary Lesion Summary: Vessel       Stenosis    Vessel Segment LAD        100% stenosis      mid Circumflex 50% stenosis       mid  Hemo Personnel: +----------------+---------+ Name            Duty      +----------------+---------+ Chance Chavez MD 1 +----------------+---------+  Hemodynamic Pressures:  +----+-------------------+---------+------------+-------------+------+---------+ Site     Date Time       Phase    Systolic    Diastolic    ED  Mean mmHg                          Name       mmHg        mmHg      mmHg           +----+-------------------+---------+------------+-------------+------+---------+   AO  1/12/2025 7:22:35 AIR REST         143           72            102                      AM                                                  +----+-------------------+---------+------------+-------------+------+---------+   LV  1/12/2025 7:36:30 AIR REST         157           11    23                               AM                                                  +----+-------------------+---------+------------+-------------+------+---------+   LV  1/12/2025 7:36:38 AIR REST         157           11    25                               AM                                                  +----+-------------------+---------+------------+-------------+------+---------+  LVp  1/12/2025 7:36:50 AIR REST         154           14    26                               AM                                                  +----+-------------------+---------+------------+-------------+------+---------+   AO  1/12/2025 7:53:06 AIR REST         106            55             75                      AM                                                  +----+-------------------+---------+------------+-------------+------+---------+  Cardiac Cath Post Procedure Notes: Post Procedure Diagnosis: Successful PCI of the LAD. Blood Loss:               Estimated blood loss during the procedure was 10 mls. Specimens Removed:        Number of specimen(s) removed: none.  Recommendations: Optimize medical therapy. Agressive risk factor modification efforts. Anti-platelet therapy with Aspirin and Ticagrelor 90mgs BID. Consider referral to cardiac rehabilitation. Medical management of coronary artery disease. Follow recommendations per Dr Perez/Dr Ackerman. ____________________________________________________________________________________ CONCLUSIONS:  1. Anterior STEMI  2. LAD: mid 100% thrombotic stenosis s/p aspiration thrombectomy and OCT guided PCI with deployement of a 2.5 x 32 Synergy LIAM.  3. Rt dominant system. LM: no obstructive CAD. LCX: mid 50% stenosis. RCA: no obstructive CAD  4. Successful PPCI -LAD with Penumbra thrombectomy and LIAM with OCT guidance: Excellent results JEANCARLOS 3 flow and zero residual stenosis. ICD 10 Codes: ST elevation (STEMI) myocardial infarction involving left anterior descending coronary artery-I21.02  CPT Codes: Ultrasound guidance for vascular access-98142; Revasc during AMI stent/angio/atherc,ins asp Thrombectomy, Left Anterior Descending single Vessel (PCI)-11028.LD; Thrombectomy, aspiration-56794; OCT Initial Vessel (coronary native vessel or graft) during diagnostic evaluation and/or therapeutic intervention, initial vessel-51029; Moderate Sedation Services initial 15 minutes patient >5 years-07922; Moderate Sedation Services 2nd additional 15 minutes patient >5 years-25289; Moderate Sedation Services 1st additional 15 minutes patient >5 years-51296; Moderate Sedation Services 3rd additional 15 minutes patient >5 years-17297;  Moderate Sedation Services 4th additional 15 minutes patient >5 years-49921  [id number] Brett Chavez MD Performing Physician Electronically signed by [id number] Brett Chavez MD on 1/12/2025 at 7:01:18 PM  ** Final **     XR chest 1 view    Result Date: 1/12/2025  STUDY: Chest Radiograph; 01/12/2025 5:42 AM INDICATION: Chest pain. COMPARISON: None. ACCESSION NUMBER(S): [id number] ORDERING CLINICIAN: ABBI GAN TECHNIQUE:  Frontal chest was obtained at 05:41:00 hours. FINDINGS: The size of the cardiac silhouette is enlarged.  No overt edema, pleural effusion, pneumothorax, or consolidation.    No overt edema, pneumothorax, pleural effusion, or focal consolidation. Signed by John Morrison MD              Assessment/Plan   Assessment & Plan  ST elevation myocardial infarction (STEMI), unspecified artery (Multi)    Marsha Tapia is a 68 yo female with PMH of breast cancer s/p mastectomy, chemo, and radiation, DM, HTN, HLD, GERD, gastric bypass, who presented to [facility] ED with chest pain, nausea/vomiting, fatigue.    She had upper respiratory symptoms for the past few days, was seen at urgent care on 1/9/25, diagnosed with URI and prescribed augmentin.  She continued to feel worse and ultimately called 911 d/t chest pain, vomiting, and SOB.  In ED, she was found to have ST elevation in anterior leads.  STEMI alert was activated; she was taken to cath lab and LHC revealed mid 100% thrombotic occlusion now s/p aspiration thrombectomy and PCI with Synergy 2.5 x 32 LIAM.      STEMI now s/p aspiration thrombectomy and PCI with Synergy 2.5 x 32 LIAM  - troponin peaked at >125,000, now downtrending   - cardiology following  - plan for aspirin/Brillinta x 1 year without interruption followed by aspirin monotherapy   - continue high intensity statin   - metoprolol held in AM due to bradycardia   - TTE is pending    - referred to cardiac rehab    Aberrant right subclavian artery   - unable to do radial approach for C;  "right femoral accessed instead   - CT revealed \"stable appearance of an aberrant right subclavian artery with a retroesophageal course. There is apparent stenosis of the right subclavian artery and vein, likely due to compression between the clavicle and rib\"   - discussed with [facility] vascular surgery; to follow as outpatient in 3 months with periodic serial CT scans     COVID-19 Infection   RSV Infection   - continue supportive care, mucolytics, cough suppressants, analgesics, albuterol inhaler   - not a candidate for inpatient COVID therapies; remains on room air   - will discontinue augmentin as low suspicion for concomitant bacterial infection. CT imaging shows mild peribronchial thickening and atelectasis, negative for consolidation     HTN   - BP variable/soft at times.  Continue lisinopril 20 mg daily. Continue to hold amlodipine     DM2   - A1c 7.3  - sliding scale insulin, hypoglycemia protocol     GERD   - stable     VTE/GI prophylaxis   - ok by cardiology to initiate DVT ppx today- lovenox ordered   - bowel regimen in place     Discharge planning   - plan to discharge home when medically stable     Discussed with Frantz Vargsa (cardiology JULY)            I spent 45 minutes in the professional and overall care of this patient.      Gail Kirby, APRN-CNP      "

## 2025-01-13 NOTE — PROGRESS NOTES
01/13/25 1016   ACS Disability Status   Are you blind or do you have serious difficulty seeing, even when wearing glasses? N   Because of a physical, mental, or emotional condition, do you have serious difficulty concentrating, remembering, or making decisions? (5 years old or older) N   Do you have serious difficulty walking or climbing stairs? N   Do you have serious difficulty dressing or bathing? N   Because of a physical, mental, or emotional condition, do you have serious difficulty doing errands alone such as visiting the doctor? N     Marsha Tapia is a 69 y.o. female on day 1 of admission presenting with ST elevation myocardial infarction (STEMI), unspecified artery (Multi).    Carolyne Qureshi RN

## 2025-01-13 NOTE — RESEARCH NOTES
Artificial Intelligence Monitoring in Nursing (AIMS Nursing) Study    Principle Investigator - Dr. Toney Marquez  Research Coordinator - Gerardo Osuna RN     Patient Name - Marsha Tapia  Date - 1/13/2025 9:50 AM  Location - [facility] 4th floor ICU, [facility]    Marsha Tapia was approached by Gerardo Osuna RN to talk about participating in the AIMS Nursing Study. The patient declined to participate in the study. Study protocol was followed and patient was given study contact information.     Gerardo Osuna RN

## 2025-01-13 NOTE — PROGRESS NOTES
"Marsha Tapia is a 69 y.o. female on day 1 of admission presenting with ST elevation myocardial infarction (STEMI), unspecified artery (Multi).    Subjective   \"Feels better, no belly pain like yesterday\"       Objective     Physical Exam  Constitutional:       General: She is not in acute distress.     Appearance: She is obese.      Comments: Older adult female, sitting in chair, very pleasant   Cardiovascular:      Rate and Rhythm: Normal rate and regular rhythm.   Pulmonary:      Effort: Pulmonary effort is normal. No respiratory distress.      Breath sounds: No stridor. No wheezing.      Comments: Slight rhonchi Lt upper lobe, otherwise clear. Equal chest rise bilaterally, no conversational dyspnea, unlabored  Abdominal:      General: There is no distension.      Palpations: Abdomen is soft.      Tenderness: There is no abdominal tenderness.   Genitourinary:     Comments: Dumont with clear yellow urine  Skin:     General: Skin is warm and dry.   Neurological:      General: No focal deficit present.      Mental Status: She is alert and oriented to person, place, and time.   Psychiatric:         Mood and Affect: Mood normal.         Behavior: Behavior normal.       Last Recorded Vitals  Blood pressure 143/53, pulse 53, temperature 36.3 °C (97.4 °F), temperature source Tympanic, resp. rate 14, weight 75.8 kg (167 lb 1.7 oz), SpO2 98%.  Intake/Output last 3 Shifts:  I/O last 3 completed shifts:  In: 450 (5.9 mL/kg) [P.O.:400; I.V.:50 (0.7 mL/kg)]  Out: 1085 (14.3 mL/kg) [Urine:1075 (0.4 mL/kg/hr); Blood:10]  Weight: 75.8 kg     Relevant Results  Scheduled medications  [Held by provider] amLODIPine, 10 mg, oral, Daily   And  [Held by provider] lisinopril, 40 mg, oral, Daily  amoxicillin-pot clavulanate, 1 tablet, oral, q12h TEN  atorvastatin, 80 mg, oral, Daily  insulin lispro, 0-5 Units, subcutaneous, TID AC  ipratropium-albuteroL, 3 mL, nebulization, q6h  lisinopril, 20 mg, oral, Daily  metoprolol tartrate, 25 mg, " oral, BID  perflutren lipid microspheres, 0.5-10 mL of dilution, intravenous, Once in imaging  perflutren protein A microsphere, 0.5 mL, intravenous, Once in imaging  polyethylene glycol, 17 g, oral, Daily  sennosides-docusate sodium, 1 tablet, oral, Nightly  simethicone, 160 mg, oral, 4x daily  sulfur hexafluoride microsphr, 2 mL, intravenous, Once in imaging  ticagrelor, 90 mg, oral, BID      Continuous medications     PRN medications  PRN medications: acetaminophen, dextrose, dextrose, fluticasone, glucagon, glucagon, ipratropium-albuteroL, nitroglycerin, ondansetron    LABS:  CMP:  Results from last 7 days   Lab Units 01/12/25  0512   SODIUM mmol/L 133*   POTASSIUM mmol/L 4.8   CHLORIDE mmol/L 103   CO2 mmol/L 23   ANION GAP mmol/L 12   BUN mg/dL 11   CREATININE mg/dL 0.70   EGFR mL/min/1.73m*2 >90   MAGNESIUM mg/dL 1.64   ALBUMIN g/dL 3.6   ALT U/L 8   AST U/L 13   BILIRUBIN TOTAL mg/dL 0.4     CBC:  Results from last 7 days   Lab Units 01/12/25  0512   WBC AUTO x10*3/uL 7.1   HEMOGLOBIN g/dL 9.7*   HEMATOCRIT % 33.2*   PLATELETS AUTO x10*3/uL 231   MCV fL 80     COAG:     HEME/ENDO:  Results from last 7 days   Lab Units 01/12/25  0633 01/12/25  0512   TSH mIU/L 3.09  --    HEMOGLOBIN A1C %  --  7.3*      CARDIAC:   Results from last 7 days   Lab Units 01/13/25  0036 01/12/25 2013 01/12/25  1621 01/12/25  0633 01/12/25  0512   TROPHS ng/L 78,957* 69,505* >125,000* 135* 122*     This patient has a urinary catheter   Reason for the urinary catheter remaining today? urinary retention/bladder outlet obstruction, acute or chronic         Assessment/Plan   Assessment & Plan  ST elevation myocardial infarction (STEMI), unspecified artery (Multi)    Marsha Tapia 69 y.o female with PMH including breast cancer, GERD, gastric bypass, vitamin deficiency, HTN, HLD, T2DM with complaints of upper respiratory symptoms since Thursday was seen at urgent care prescribed abx and sent home.  The past few day she continued to feel  worse with increased chest pain, nausea, vomiting, and fatigue.  This morning she called 911 d/t worsening chest pain, vomiting and shortness of breath.  Upon arrival to ED she was found to have ST elevation in anterior leads, STEMI alert activated she was given 324 ASA, 180 Brilinta , ntg, morphine and taken to Cath lab.   LHC revealed LAD: mid 100% thrombotic occlusion now s/p aspiration thrombectomy and PCI with Synergy 2.5 x 32 LIAM and was admitted to ICU for further observation.    NEURO:   - Serial neuro and pain assessments  - A-F Bundle  - PRN tylenol     CV:   # STEMI LAD: mid 100% thrombotic occlusion now s/p aspiration thrombectomy and PCI with LIAM  # HTN, HLD  NSR 70s  - Continuous EKG and NIBP monitoring  - Maintain MAP goal MAP >65   - Hold home antihypertensives given BP; resume as HDs permit  - Home statin 80 mg daily  - Cardiology following; appreciate recs   -> BB, added hold parameters today given SB overnight   -> TTE ordered   -> Troponins peaked and downtrended; discontinued  -> ASA, Brilinta 90mg bid     PULM:    # Presumed URI in setting of productive cough, rhonchi and wheezing  - RT consulted  - Duo nebs scheduled and prn   - Monitor SpO2, goal >92%     :    # Acute urinary retention  - Dumont placed yesterday for retention  - Strict I/Os  - RFP daily and replete electrolytes as needed     GI:    - Bowel regimen- senokot and miralax  - Diabetic diet     HEME:    - Trend CBC  - DVT prophylaxis: SCDs, chemoppx held I/s/o Brilinta load. Resume pending cardiology rec     ENDO:    # DMII  - Hold home metformin s/p cath  - POCT BG and ISS qAC/HS, hypoglycemia protocol. Maintain BG < 180     ID:  # Sinusitis vs URI vs Pneumonia  Resp panel pending  UA negative for LEs, nitrites  Utox (+) opiates, fentanyl, benzos after sedation with LHC  - Continue home Augmentin  - Trend WBC, temps     Dispo: transfer to SDU         Alondra Krishnamurthy PA-C

## 2025-01-13 NOTE — PROGRESS NOTES
Marsha Tapia is a 69 y.o. female     Transferred to ICU  Currently chest pain-free  Records reviewed    Review of Systems     Constitutional: no fever, no chills, not feeling poorly, not feeling tired   Cardiovascular: no chest pain   Respiratory: no cough, wheezing or shortness of breath a  Gastrointestinal: no abdominal pain, no constipation, no melena, no nausea, no diarrhea, no vomiting and no blood in stools.   Neurological: no headache,   All other systems have been reviewed and are negative for complaint.       Vitals:    01/13/25 1200   BP: 133/62   Pulse: 56   Resp: 17   Temp:    SpO2: 96%        Scheduled medications  [Held by provider] amLODIPine, 10 mg, oral, Daily   And  [Held by provider] lisinopril, 40 mg, oral, Daily  amoxicillin-pot clavulanate, 1 tablet, oral, q12h TEN  aspirin, 81 mg, oral, Daily  atorvastatin, 80 mg, oral, Daily  enoxaparin, 40 mg, subcutaneous, q24h  insulin lispro, 0-5 Units, subcutaneous, TID AC  ipratropium-albuteroL, 3 mL, nebulization, q6h  [START ON 1/14/2025] lisinopril, 40 mg, oral, Daily  perflutren lipid microspheres, 0.5-10 mL of dilution, intravenous, Once in imaging  perflutren protein A microsphere, 0.5 mL, intravenous, Once in imaging  polyethylene glycol, 17 g, oral, Daily  sennosides-docusate sodium, 1 tablet, oral, Nightly  simethicone, 160 mg, oral, 4x daily  sulfur hexafluoride microsphr, 2 mL, intravenous, Once in imaging  ticagrelor, 90 mg, oral, BID      Continuous medications     PRN medications  PRN medications: acetaminophen, benzocaine-menthol, dextrose, dextrose, fluticasone, glucagon, glucagon, guaiFENesin, ipratropium-albuteroL, nitroglycerin, ondansetron    Lab Review   Results from last 7 days   Lab Units 01/13/25  0858 01/12/25  0512   WBC AUTO x10*3/uL 9.0 7.1   HEMOGLOBIN g/dL 9.1* 9.7*   HEMATOCRIT % 29.6* 33.2*   PLATELETS AUTO x10*3/uL 214 231     Results from last 7 days   Lab Units 01/13/25  0539 01/12/25  0512   SODIUM mmol/L 136 133*    POTASSIUM mmol/L 3.4* 4.8   CHLORIDE mmol/L 108* 103   CO2 mmol/L 24 23   BUN mg/dL 10 11   CREATININE mg/dL 0.67 0.70   CALCIUM mg/dL 7.6* 8.2*   PROTEIN TOTAL g/dL  --  6.7   BILIRUBIN TOTAL mg/dL  --  0.4   ALK PHOS U/L  --  102   ALT U/L  --  8   AST U/L  --  13   GLUCOSE mg/dL 129* 194*     Results from last 7 days   Lab Units 01/13/25  0539 01/13/25  0036 01/12/25 2013   TROPHS ng/L 61,834* 78,957* 69,505*        CT angio chest abdomen pelvis   Final Result   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 There is apparent stenosis of the right   subclavian artery and vein, likely due to compression between the   clavicle and rib.        Similar appearance of left breast skin thickening and a 1.9 cm   retroareolar nodule that now contains a punctate calcification.   Correlate with any prior mammographic studies to exclude inflammatory   neoplasm.        Enlarged right paratracheal lymph node as well as mildly prominent   mediastinal lymph nodes are indeterminate may be reactive or   neoplastic.        Cardiomegaly with coronary artery calcifications and a stent within   the LAD.        Findings of mild pulmonary vascular congestion within the lungs.        Additional stable chronic findings, as above.        Signed by: Enzo Meraz 1/13/2025 11:06 AM   Dictation workstation:   [id number]      Cardiac Catheterization Procedure   Final Result      XR chest 1 view   Final Result   No overt edema, pneumothorax, pleural effusion, or focal   consolidation.    Signed by John Morrison MD      Transthoracic Echo (TTE) Complete    (Results Pending)         Physical Exam    Constitutional   General appearance: Alert and in no acute distress.   Pulmonary   Respiratory assessment: No respiratory distress, normal respiratory rhythm and effort.    Auscultation of Lungs: Clear bilateral breath sounds.   Cardiovascular   Auscultation of heart: Apical pulse normal, heart rate  and rhythm normal, normal S1 and S2, no murmurs and no pericardial rub.    Exam for edema: No peripheral edema.   Abdomen   Abdominal Exam: No bruits, normal bowel sounds, soft, non-tender, no abdominal mass palpated.    Liver and Spleen exam: No hepato-splenomegaly.   Musculoskeletal   Examination of gait: Normal.    Inspection of digits and nails: No clubbing or cyanosis of the fingernails.    Inspection/palpation of joints, bones and muscles: No joint swelling. Normal movement of all extremities.   Neurologic   Cranial nerves: Nerves 2-12 were intact, no focal neuro defects.         Assessment/Plan      #STEMI  Secondary to thrombosed blood vessel  Currently chest pain-free and no shortness of breath  On aspirin and Brilinta    #Breast abnormality on CAT scan  Is due for her mammogram in March  We will try to get it done sooner    #Dyslipidemia  Target LDL less than 70    #Diabetes mellitus  Current A1c is 7.0  Will initiate SGLT2    #Hypertension  Stable continue home medications

## 2025-01-13 NOTE — PROGRESS NOTES
"Subjective Data:  Still feels a little congested- currently on room air   No chest pain, dizziness or lightheadedness  Plan to transfer to stepdown today  Metoprolol held this morning due to bradycardia       Overnight Events:    N/A     Objective Data:  Last Recorded Vitals:  Vitals:    01/13/25 0600 01/13/25 0700 01/13/25 0800 01/13/25 0900   BP: 135/61 134/51 147/78 121/52   BP Location:   Left arm    Patient Position:   Sitting    Pulse: (!) 45 50 61 68   Resp: 19 19 25 (!) 30   Temp:   36.1 °C (97 °F)    TempSrc:   Temporal    SpO2: 99% 100% 99% 98%   Weight:           Last Labs:  LABS:  CMP:  Results from last 7 days   Lab Units 01/13/25  0539 01/12/25  0512   SODIUM mmol/L 136 133*   POTASSIUM mmol/L 3.4* 4.8   CHLORIDE mmol/L 108* 103   CO2 mmol/L 24 23   ANION GAP mmol/L 7* 12   BUN mg/dL 10 11   CREATININE mg/dL 0.67 0.70   EGFR mL/min/1.73m*2 >90 >90   MAGNESIUM mg/dL 1.91 1.64   ALBUMIN g/dL 3.0* 3.6   ALT U/L  --  8   AST U/L  --  13   BILIRUBIN TOTAL mg/dL  --  0.4     CBC:  Results from last 7 days   Lab Units 01/13/25  0858 01/12/25  0512   WBC AUTO x10*3/uL 9.0 7.1   HEMOGLOBIN g/dL 9.1* 9.7*   HEMATOCRIT % 29.6* 33.2*   PLATELETS AUTO x10*3/uL 214 231   MCV fL 78* 80     COAG:     ABO: No results found for: \"ABO\"  HEME/ENDO:  Results from last 7 days   Lab Units 01/12/25  0633 01/12/25  0512   TSH mIU/L 3.09  --    HEMOGLOBIN A1C %  --  7.3*      CARDIAC:   Results from last 7 days   Lab Units 01/13/25  0539 01/13/25  0036 01/12/25  2013 01/12/25  1621 01/12/25  0633 01/12/25  0512   TROPHS ng/L 61,834* 78,957* 69,505* >125,000* 135* 122*   BNP pg/mL  --   --   --   --   --  217*     Recent Labs     01/12/25  0633 04/16/24  0835 11/09/22  0817 04/27/21  1215 10/19/20  0731   CHOL 160 220* 182 206* 212*   LDLF  --   --  120* 140* 155*   LDLCALC 104* 156*  --   --   --    HDL 42.3 50.7 52.0 49.6 40.6   TRIG 67 69 52 80 80      Last I/O:  I/O last 3 completed shifts:  In: 450 (5.9 mL/kg) [P.O.:400; " I.V.:50 (0.7 mL/kg)]  Out: 1885 (24.9 mL/kg) [Urine:1875 (0.7 mL/kg/hr); Blood:10]  Weight: 75.8 kg     Past Cardiology Tests (Last 3 Years):  EKG:  ECG 12 lead 01/12/2025     Echo:  Transthoracic Echo (TTE) Complete 11/04/2024   1. Left ventricular ejection fraction is normal, by visual estimate at 65-70%.   2. Spectral Doppler shows a Grade II (pseudonormal pattern) of left ventricular diastolic filling with an elevated left atrial pressure.   3. There is moderately increased septal and moderately increased posterior left ventricular wall thickness.   4. There is severely increased concentric left ventricular hypertrophy.   5. There is normal right ventricular global systolic function.   6. The left atrium is severely dilated.   7. The right atrium is moderately dilated.   8. Moderate aortic valve stenosis.   9. There is moderate aortic valve cusp calcification.    7/21/2021   1. The left ventricular systolic function is normal with a 70-75% estimated ejection fraction.   2. Poorly visualized anatomical structures due to suboptimal image quality.   3. Spectral Doppler shows a pseudonormal pattern of left ventricular diastolic filling.   4. Increased LV mass.   5. The left atrium is severely dilated.   6. Mild aortic valve stenosis.      Ejection Fractions:  EF   Date/Time Value Ref Range Status   11/04/2024 10:34 AM 68 %      Cath:  Cardiac Catheterization Procedure 01/12/2025  LM: no obstructive CAD            LAD: mid 100% thrombotic occlusion now s/p aspiration thrombectomy and PCI with Synergy 2.5 x 32 LIAM  LCX: mid 50% stenosis  RCA: no obstructive CAD      Stress Test:  Nuclear Stress Test 2021  1. Relatively normal stress myocardial perfusion imaging in response to pharmacologic stress in the setting of moderate breast attenuation.  2. Well-maintained left ventricular function.    Cardiac Imaging:  CT Heart Calcium Score 2021  1. Coronary artery calcium score of 208*.     Coronary Artery Score             Annual Risk   0-99                                         0.4%  100-399                                     1.3%  >400                                          2.4%    Inpatient Medications:  Scheduled medications   Medication Dose Route Frequency    [Held by provider] amLODIPine  10 mg oral Daily    And    [Held by provider] lisinopril  40 mg oral Daily    amoxicillin-pot clavulanate  1 tablet oral q12h TEN    aspirin  81 mg oral Daily    atorvastatin  80 mg oral Daily    insulin lispro  0-5 Units subcutaneous TID AC    ipratropium-albuteroL  3 mL nebulization q6h    lisinopril  20 mg oral Daily    metoprolol tartrate  25 mg oral BID    perflutren lipid microspheres  0.5-10 mL of dilution intravenous Once in imaging    perflutren protein A microsphere  0.5 mL intravenous Once in imaging    polyethylene glycol  17 g oral Daily    sennosides-docusate sodium  1 tablet oral Nightly    simethicone  160 mg oral 4x daily    sulfur hexafluoride microsphr  2 mL intravenous Once in imaging    ticagrelor  90 mg oral BID     PRN medications   Medication    acetaminophen    benzocaine-menthol    dextrose    dextrose    fluticasone    glucagon    glucagon    guaiFENesin    ipratropium-albuteroL    nitroglycerin    ondansetron     Continuous Medications   Medication Dose Last Rate       Physical Exam:  GENERAL: alert, cooperative, pleasant, in no acute distress  SKIN: warm, dry Right radial cath site with no ooze or hematoma   NECK: no JVD  CARDIAC: Regular rate and rhythm 2/6 systolic murmur   PULMONARY: Normal respiratory efforts, lungs clear to auscultation bilaterally.  ABDOMEN: soft, nondistended  EXTREMITIES: no lower extremity edema  NEURO: Alert and oriented x 3.  Grossly normal.  Moves all 4 extremities.     I reviewed EKGs, labs and all imaging reports  I reviewed telemetry which showed  SR/SB HR 40-60s PVCs      Assessment/Plan   Marsha Tapia is a 69 y.o. female, with a PMH of HTN, HLD, asymptomatic sinus bradycardia,  T2DM, breast CA s/p mastectomy/chemo/radiation, IgA deficiency, aortic stenosis, OA, GERD, and hx of gastric bypass, who presented to [facility] on 1/12/2025 for chest pain. Patient reports she was initially seen in urgent care on 1/9 for URI symptoms- nasal congestion, cough, rhinorrhea, headache, and myalgias. She was given PO abx and sent home. Her BP was noted to be high during that visit, 180/72, but improved prior to her leaving. She continued to feel worse with increase chest pain, n/v, and fatigue over the past few days that she associated to the illness. This morning, she woke up with worsening chest pain, SOB, and vomiting so she called 911.      ED course notable for  ST elevation in anterior leads, STEMI alert activated she was given 324 ASA, 180 Brilinta, ntg, morphine and taken to emergently to the cath lab. LHC revealed 100% thrombotic occlusion to the mid-LAD s/p aspiration thrombectomy and PCI with Synergy 2.5x 32 LIAM. Patient was admitted to the ICU post-catheterization. Cardiology is consulted for post STEMI care/recommendations.      Home CV Medications: Amlodipine-Benazepril 10mg-40mg daily, Atorvastatin 20mg daily, HCTZ 12.5mg daily     #CAD c/b STEMI to the LAD- Cath revealed 100% thrombotic occlusion to the mid-LAD s/p aspiration thrombectomy and PCI with Synergy 2.5x 32 LIAM  -ASA/Ticagrelor for 1 year without interruption followed by lifelong ASA  -Continue increased Atorvastatin  80mg daily  -Troponin peaked at >125,000 and downtrended   -Metoprolol held this morning due to bradycardia   -Continue Lisinopril 20 mg daily      #HTN- variable. Currently on Lisinopril 20 mg daily.     #HLD- , Continue statin as above  #DM- Optimize glycemic control per primary. HbgA1C 7.3        RECOMMENDATIONS:  -TTE today   -DAPT with ASA/Ticagrelor x1yr without interruption, then lifelong ASA  -Resume medications as indicated for BP control  -Continuous telemetry monitoring  -Monitor  electrolytes, replete for K <4 and Mg <2  -Cardiac rehab referral at discharge  -Patient will need FU with cardiology in 2-3 weeks post-discharge    Code Status:  Full Code    Frantz Nathan, APRN-CNP

## 2025-01-13 NOTE — PROGRESS NOTES
Marsha Tapia was identified as being a new start on a high cost medication.    Medication name(s): Brilinta 90 mg BID    Discussed with patient. Per claim on  Pharmacy software, the copay is $88.20 per month. The cost is not affordable for patient.     Since cost not affordable, discussed cost assistance options that may be available to the patient:  Patient Assistance - PCP. Outreach will be made to  provider for pharmacy referral. Patient reports her yearly income is <400% of [employer] and she lives alone.    30-day free trial will be used for meds to beds.    Medication counseling provided to patient on what the medication does, dosing, side effects, and monitoring parameters. Answered all questions regarding medication, cost, and assistance options to the best of my ability.    Florence Wright, PharmD  Transitions of Care  P: [phone]

## 2025-01-14 ENCOUNTER — DOCUMENTATION (OUTPATIENT)
Dept: CARDIAC REHAB | Facility: CLINIC | Age: 70
End: 2025-01-14
Payer: MEDICARE

## 2025-01-14 ENCOUNTER — APPOINTMENT (OUTPATIENT)
Dept: CARDIOLOGY | Facility: HOSPITAL | Age: 70
End: 2025-01-14
Payer: MEDICARE

## 2025-01-14 LAB
ALBUMIN SERPL BCP-MCNC: 3.3 G/DL (ref 3.4–5)
ANION GAP SERPL CALC-SCNC: 9 MMOL/L (ref 10–20)
AORTIC VALVE MEAN GRADIENT: 19 MMHG
AORTIC VALVE PEAK VELOCITY: 2.94 M/S
AV PEAK GRADIENT: 34 MMHG
BUN SERPL-MCNC: 11 MG/DL (ref 6–23)
CALCIUM SERPL-MCNC: 8.4 MG/DL (ref 8.6–10.3)
CHLORIDE SERPL-SCNC: 106 MMOL/L (ref 98–107)
CO2 SERPL-SCNC: 26 MMOL/L (ref 21–32)
CREAT SERPL-MCNC: 0.84 MG/DL (ref 0.5–1.05)
EGFRCR SERPLBLD CKD-EPI 2021: 75 ML/MIN/1.73M*2
EJECTION FRACTION APICAL 4 CHAMBER: 57.9
EJECTION FRACTION: 60 %
ERYTHROCYTE [DISTWIDTH] IN BLOOD BY AUTOMATED COUNT: 18.5 % (ref 11.5–14.5)
FERRITIN SERPL-MCNC: 30 NG/ML (ref 8–150)
FOLATE SERPL-MCNC: 10.3 NG/ML
GLUCOSE BLD MANUAL STRIP-MCNC: 103 MG/DL (ref 74–99)
GLUCOSE BLD MANUAL STRIP-MCNC: 118 MG/DL (ref 74–99)
GLUCOSE BLD MANUAL STRIP-MCNC: 90 MG/DL (ref 74–99)
GLUCOSE SERPL-MCNC: 143 MG/DL (ref 74–99)
HCT VFR BLD AUTO: 27.1 % (ref 36–46)
HGB BLD-MCNC: 8.2 G/DL (ref 12–16)
HOLD SPECIMEN: NORMAL
IRON SATN MFR SERPL: 8 % (ref 25–45)
IRON SERPL-MCNC: 26 UG/DL (ref 35–150)
LEFT VENTRICLE INTERNAL DIMENSION DIASTOLE: 4.2 CM (ref 3.5–6)
MAGNESIUM SERPL-MCNC: 1.94 MG/DL (ref 1.6–2.4)
MCH RBC QN AUTO: 23.6 PG (ref 26–34)
MCHC RBC AUTO-ENTMCNC: 30.3 G/DL (ref 32–36)
MCV RBC AUTO: 78 FL (ref 80–100)
MITRAL VALVE E/A RATIO: 1.36
NRBC BLD-RTO: 0 /100 WBCS (ref 0–0)
PHOSPHATE SERPL-MCNC: 3.1 MG/DL (ref 2.5–4.9)
PLATELET # BLD AUTO: 203 X10*3/UL (ref 150–450)
POTASSIUM SERPL-SCNC: 3.9 MMOL/L (ref 3.5–5.3)
RBC # BLD AUTO: 3.47 X10*6/UL (ref 4–5.2)
SODIUM SERPL-SCNC: 137 MMOL/L (ref 136–145)
TIBC SERPL-MCNC: 321 UG/DL (ref 240–445)
TRICUSPID ANNULAR PLANE SYSTOLIC EXCURSION: 2.9 CM
UIBC SERPL-MCNC: 295 UG/DL (ref 110–370)
VIT B12 SERPL-MCNC: 299 PG/ML (ref 211–911)
WBC # BLD AUTO: 7.9 X10*3/UL (ref 4.4–11.3)

## 2025-01-14 PROCEDURE — 82728 ASSAY OF FERRITIN: CPT | Performed by: NURSE PRACTITIONER

## 2025-01-14 PROCEDURE — 99233 SBSQ HOSP IP/OBS HIGH 50: CPT | Performed by: NURSE PRACTITIONER

## 2025-01-14 PROCEDURE — 99232 SBSQ HOSP IP/OBS MODERATE 35: CPT | Performed by: INTERNAL MEDICINE

## 2025-01-14 PROCEDURE — 85027 COMPLETE CBC AUTOMATED: CPT | Performed by: STUDENT IN AN ORGANIZED HEALTH CARE EDUCATION/TRAINING PROGRAM

## 2025-01-14 PROCEDURE — 36415 COLL VENOUS BLD VENIPUNCTURE: CPT | Performed by: STUDENT IN AN ORGANIZED HEALTH CARE EDUCATION/TRAINING PROGRAM

## 2025-01-14 PROCEDURE — 2500000004 HC RX 250 GENERAL PHARMACY W/ HCPCS (ALT 636 FOR OP/ED): Performed by: NURSE PRACTITIONER

## 2025-01-14 PROCEDURE — 82607 VITAMIN B-12: CPT | Mod: AHULAB | Performed by: NURSE PRACTITIONER

## 2025-01-14 PROCEDURE — 83550 IRON BINDING TEST: CPT | Performed by: NURSE PRACTITIONER

## 2025-01-14 PROCEDURE — 2500000002 HC RX 250 W HCPCS SELF ADMINISTERED DRUGS (ALT 637 FOR MEDICARE OP, ALT 636 FOR OP/ED): Performed by: STUDENT IN AN ORGANIZED HEALTH CARE EDUCATION/TRAINING PROGRAM

## 2025-01-14 PROCEDURE — 99232 SBSQ HOSP IP/OBS MODERATE 35: CPT | Performed by: NURSE PRACTITIONER

## 2025-01-14 PROCEDURE — RXMED WILLOW AMBULATORY MEDICATION CHARGE

## 2025-01-14 PROCEDURE — 82746 ASSAY OF FOLIC ACID SERUM: CPT | Mod: AHULAB | Performed by: NURSE PRACTITIONER

## 2025-01-14 PROCEDURE — 93308 TTE F-UP OR LMTD: CPT

## 2025-01-14 PROCEDURE — 2500000001 HC RX 250 WO HCPCS SELF ADMINISTERED DRUGS (ALT 637 FOR MEDICARE OP): Performed by: STUDENT IN AN ORGANIZED HEALTH CARE EDUCATION/TRAINING PROGRAM

## 2025-01-14 PROCEDURE — 2060000001 HC INTERMEDIATE ICU ROOM DAILY

## 2025-01-14 PROCEDURE — 93308 TTE F-UP OR LMTD: CPT | Performed by: INTERNAL MEDICINE

## 2025-01-14 PROCEDURE — 82947 ASSAY GLUCOSE BLOOD QUANT: CPT

## 2025-01-14 PROCEDURE — 2500000004 HC RX 250 GENERAL PHARMACY W/ HCPCS (ALT 636 FOR OP/ED): Performed by: STUDENT IN AN ORGANIZED HEALTH CARE EDUCATION/TRAINING PROGRAM

## 2025-01-14 PROCEDURE — 80069 RENAL FUNCTION PANEL: CPT | Performed by: STUDENT IN AN ORGANIZED HEALTH CARE EDUCATION/TRAINING PROGRAM

## 2025-01-14 PROCEDURE — 2500000001 HC RX 250 WO HCPCS SELF ADMINISTERED DRUGS (ALT 637 FOR MEDICARE OP)

## 2025-01-14 PROCEDURE — 83735 ASSAY OF MAGNESIUM: CPT | Performed by: STUDENT IN AN ORGANIZED HEALTH CARE EDUCATION/TRAINING PROGRAM

## 2025-01-14 RX ORDER — LISINOPRIL 40 MG/1
40 TABLET ORAL DAILY
Qty: 30 TABLET | Refills: 1 | Status: SHIPPED | OUTPATIENT
Start: 2025-01-15 | End: 2025-01-24 | Stop reason: SINTOL

## 2025-01-14 RX ADMIN — SIMETHICONE 160 MG: 80 TABLET, CHEWABLE ORAL at 16:29

## 2025-01-14 RX ADMIN — PERFLUTREN 10 ML OF DILUTION: 6.52 INJECTION, SUSPENSION INTRAVENOUS at 17:53

## 2025-01-14 RX ADMIN — SIMETHICONE 160 MG: 80 TABLET, CHEWABLE ORAL at 21:10

## 2025-01-14 RX ADMIN — SIMETHICONE 160 MG: 80 TABLET, CHEWABLE ORAL at 06:06

## 2025-01-14 RX ADMIN — ENOXAPARIN SODIUM 40 MG: 40 INJECTION SUBCUTANEOUS at 12:21

## 2025-01-14 RX ADMIN — ASPIRIN 81 MG: 81 TABLET, COATED ORAL at 09:57

## 2025-01-14 RX ADMIN — POLYETHYLENE GLYCOL 3350 17 G: 17 POWDER, FOR SOLUTION ORAL at 09:57

## 2025-01-14 RX ADMIN — ATORVASTATIN CALCIUM 80 MG: 80 TABLET, FILM COATED ORAL at 09:57

## 2025-01-14 RX ADMIN — SIMETHICONE 160 MG: 80 TABLET, CHEWABLE ORAL at 12:21

## 2025-01-14 RX ADMIN — TICAGRELOR 90 MG: 90 TABLET ORAL at 21:10

## 2025-01-14 RX ADMIN — SENNOSIDES AND DOCUSATE SODIUM 1 TABLET: 50; 8.6 TABLET ORAL at 21:10

## 2025-01-14 RX ADMIN — LISINOPRIL 40 MG: 20 TABLET ORAL at 09:57

## 2025-01-14 RX ADMIN — TICAGRELOR 90 MG: 90 TABLET ORAL at 09:57

## 2025-01-14 ASSESSMENT — COGNITIVE AND FUNCTIONAL STATUS - GENERAL
DAILY ACTIVITIY SCORE: 20
MOVING FROM LYING ON BACK TO SITTING ON SIDE OF FLAT BED WITH BEDRAILS: A LITTLE
MOBILITY SCORE: 17
DRESSING REGULAR UPPER BODY CLOTHING: A LITTLE
MOVING TO AND FROM BED TO CHAIR: A LITTLE
STANDING UP FROM CHAIR USING ARMS: A LITTLE
TURNING FROM BACK TO SIDE WHILE IN FLAT BAD: A LITTLE
WALKING IN HOSPITAL ROOM: A LITTLE
HELP NEEDED FOR BATHING: A LITTLE
TOILETING: A LITTLE
DRESSING REGULAR LOWER BODY CLOTHING: A LITTLE
CLIMB 3 TO 5 STEPS WITH RAILING: A LOT

## 2025-01-14 ASSESSMENT — PAIN SCALES - GENERAL
PAINLEVEL_OUTOF10: 3
PAINLEVEL_OUTOF10: 0 - NO PAIN
PAINLEVEL_OUTOF10: 0 - NO PAIN

## 2025-01-14 ASSESSMENT — PAIN - FUNCTIONAL ASSESSMENT
PAIN_FUNCTIONAL_ASSESSMENT: 0-10
PAIN_FUNCTIONAL_ASSESSMENT: 0-10

## 2025-01-14 NOTE — PROGRESS NOTES
"Subjective Data:  No chest pain or shortness of breath   Still has cough   Echo pending     Telemetry SB/SR     Overnight Events:    N/A     Objective Data:  Last Recorded Vitals:  Vitals:    01/14/25 0700 01/14/25 0800 01/14/25 0900 01/14/25 1218   BP:  127/70  (!) 137/49   BP Location:    Right arm   Patient Position:    Lying   Pulse: (!) 49 55 81 56   Resp: 21 (!) 30 24 25   Temp:    36.2 °C (97.2 °F)   TempSrc:    Temporal   SpO2: 96% 97% 100% 98%   Weight:           Last Labs:  LABS:  CMP:  Results from last 7 days   Lab Units 01/14/25  0606 01/13/25  0539 01/12/25  0512   SODIUM mmol/L 137 136 133*   POTASSIUM mmol/L 3.9 3.4* 4.8   CHLORIDE mmol/L 106 108* 103   CO2 mmol/L 26 24 23   ANION GAP mmol/L 9* 7* 12   BUN mg/dL 11 10 11   CREATININE mg/dL 0.84 0.67 0.70   EGFR mL/min/1.73m*2 75 >90 >90   MAGNESIUM mg/dL 1.94 1.91 1.64   ALBUMIN g/dL 3.3* 3.0* 3.6   ALT U/L  --   --  8   AST U/L  --   --  13   BILIRUBIN TOTAL mg/dL  --   --  0.4     CBC:  Results from last 7 days   Lab Units 01/14/25  0606 01/13/25  0858 01/12/25  0512   WBC AUTO x10*3/uL 7.9 9.0 7.1   HEMOGLOBIN g/dL 8.2* 9.1* 9.7*   HEMATOCRIT % 27.1* 29.6* 33.2*   PLATELETS AUTO x10*3/uL 203 214 231   MCV fL 78* 78* 80     COAG:     ABO: No results found for: \"ABO\"  HEME/ENDO:  Results from last 7 days   Lab Units 01/14/25  0606 01/12/25  0633 01/12/25  0512   FERRITIN ng/mL 30  --   --    IRON SATURATION % 8*  --   --    TSH mIU/L  --  3.09  --    HEMOGLOBIN A1C %  --   --  7.3*      CARDIAC:   Results from last 7 days   Lab Units 01/13/25  0539 01/13/25  0036 01/12/25  2013 01/12/25  1621 01/12/25  0633 01/12/25  0512   TROPHS ng/L 61,834* 78,957* 69,505* >125,000* 135* 122*   BNP pg/mL  --   --   --   --   --  217*     Recent Labs     01/12/25  0633 04/16/24  0835 11/09/22  0817 04/27/21  1215 10/19/20  0731   CHOL 160 220* 182 206* 212*   LDLF  --   --  120* 140* 155*   LDLCALC 104* 156*  --   --   --    HDL 42.3 50.7 52.0 49.6 40.6   TRIG 67 " 69 52 80 80      Last I/O:  I/O last 3 completed shifts:  In: - (0 mL/kg)   Out: 935 (12.3 mL/kg) [Urine:935 (0.3 mL/kg/hr)]  Weight: 75.8 kg     Past Cardiology Tests (Last 3 Years):  EKG:  ECG 12 lead 01/12/2025     Echo:  Transthoracic Echo (TTE) Complete 11/04/2024   1. Left ventricular ejection fraction is normal, by visual estimate at 65-70%.   2. Spectral Doppler shows a Grade II (pseudonormal pattern) of left ventricular diastolic filling with an elevated left atrial pressure.   3. There is moderately increased septal and moderately increased posterior left ventricular wall thickness.   4. There is severely increased concentric left ventricular hypertrophy.   5. There is normal right ventricular global systolic function.   6. The left atrium is severely dilated.   7. The right atrium is moderately dilated.   8. Moderate aortic valve stenosis.   9. There is moderate aortic valve cusp calcification.    7/21/2021   1. The left ventricular systolic function is normal with a 70-75% estimated ejection fraction.   2. Poorly visualized anatomical structures due to suboptimal image quality.   3. Spectral Doppler shows a pseudonormal pattern of left ventricular diastolic filling.   4. Increased LV mass.   5. The left atrium is severely dilated.   6. Mild aortic valve stenosis.      Ejection Fractions:  EF   Date/Time Value Ref Range Status   11/04/2024 10:34 AM 68 %      Cath:  Cardiac Catheterization Procedure 01/12/2025  LM: no obstructive CAD            LAD: mid 100% thrombotic occlusion now s/p aspiration thrombectomy and PCI with Synergy 2.5 x 32 LIAM  LCX: mid 50% stenosis  RCA: no obstructive CAD      Stress Test:  Nuclear Stress Test 2021  1. Relatively normal stress myocardial perfusion imaging in response to pharmacologic stress in the setting of moderate breast attenuation.  2. Well-maintained left ventricular function.    Cardiac Imaging:  CT Heart Calcium Score 2021  1. Coronary artery calcium score of  208*.     Coronary Artery Score            Annual Risk   0-99                                         0.4%  100-399                                     1.3%  >400                                          2.4%    Inpatient Medications:  Scheduled medications   Medication Dose Route Frequency    [Held by provider] amLODIPine  10 mg oral Daily    And    [Held by provider] lisinopril  40 mg oral Daily    aspirin  81 mg oral Daily    atorvastatin  80 mg oral Daily    enoxaparin  40 mg subcutaneous q24h    insulin lispro  0-5 Units subcutaneous TID AC    lisinopril  40 mg oral Daily    perflutren lipid microspheres  0.5-10 mL of dilution intravenous Once in imaging    perflutren protein A microsphere  0.5 mL intravenous Once in imaging    polyethylene glycol  17 g oral Daily    sennosides-docusate sodium  1 tablet oral Nightly    simethicone  160 mg oral 4x daily    sulfur hexafluoride microsphr  2 mL intravenous Once in imaging    ticagrelor  90 mg oral BID     PRN medications   Medication    acetaminophen    albuterol    benzocaine-menthol    dextrose    dextrose    fluticasone    glucagon    glucagon    guaiFENesin    nitroglycerin    ondansetron     Continuous Medications   Medication Dose Last Rate       Physical Exam:  GENERAL: alert, cooperative, pleasant, in no acute distress  SKIN: warm, dry Right radial cath site with no ooze or hematoma   NECK: no JVD  CARDIAC: Regular rate and rhythm 2/6 systolic murmur   PULMONARY: Normal respiratory efforts, lungs clear to auscultation bilaterally.  ABDOMEN: soft, nondistended  EXTREMITIES: no lower extremity edema  NEURO: Alert and oriented x 3.  Grossly normal.  Moves all 4 extremities.        Assessment/Plan   Marsha Tapia is a 69 y.o. female, with a PMH of HTN, HLD, asymptomatic sinus bradycardia, T2DM, breast CA s/p mastectomy/chemo/radiation, IgA deficiency, aortic stenosis, OA, GERD, and hx of gastric bypass, who presented to [facility] on 1/12/2025 for  chest pain. Patient reports she was initially seen in urgent care on 1/9 for URI symptoms- nasal congestion, cough, rhinorrhea, headache, and myalgias. She was given PO abx and sent home. Her BP was noted to be high during that visit, 180/72, but improved prior to her leaving. She continued to feel worse with increase chest pain, n/v, and fatigue over the past few days that she associated to the illness. This morning, she woke up with worsening chest pain, SOB, and vomiting so she called 911.      ED course notable for  ST elevation in anterior leads, STEMI alert activated she was given 324 ASA, 180 Brilinta, ntg, morphine and taken to emergently to the cath lab. LHC revealed 100% thrombotic occlusion to the mid-LAD s/p aspiration thrombectomy and PCI with Synergy 2.5x 32 LIAM. Patient was admitted to the ICU post-catheterization. Cardiology is consulted for post STEMI care/recommendations.      Home CV Medications: Amlodipine-Benazepril 10mg-40mg daily, Atorvastatin 20mg daily, HCTZ 12.5mg daily     #CAD c/b STEMI to the LAD- Cath revealed 100% thrombotic occlusion to the mid-LAD s/p aspiration thrombectomy and PCI with Synergy 2.5x 32 LIAM  -ASA/Ticagrelor for 1 year without interruption followed by lifelong ASA  -Continue increased Atorvastatin  80mg daily  -Troponin peaked at >125,000 and downtrended   -Metoprolol held due to bradycardia.   -Continue Lisinopril 40 mg daily      #HTN- variable. Currently on Lisinopril 40 mg daily. Holding home amlodipine    #HLD- , Continue statin as above  #DM- Optimize glycemic control per primary. HbgA1C 7.3  #Covid and RSV positive- management per primary team         RECOMMENDATIONS:  -TTE  pending   -DAPT with ASA/Ticagrelor x1yr without interruption, then lifelong ASA  -Continuous telemetry monitoring  -Monitor electrolytes, replete for K <4 and Mg <2  -Cardiac rehab referral at discharge  -Patient will need FU with cardiology in 2-3 weeks post-discharge (appointment  to be arranged by his )  -Okay to discharge today if echo is stable     Code Status:  Full Code    Frantz Nathan, APRN-CNP

## 2025-01-14 NOTE — PROGRESS NOTES
Marsha Tapia is a 69 y.o. female on day 2 of admission presenting with ST elevation myocardial infarction (STEMI), unspecified artery (Multi).    Subjective     Feeling better today.  Still with mild phlegmy cough but otherwise no respiratory symptoms.  Denies chest pain, shortness of breath.     Objective     Physical Exam    Constitutional: NAD, pt alert and cooperative  Eyes: no icterus  ENMT: mucous membranes moist, no lesions seen  Head/Neck: Neck supple  Respiratory/Thorax: CTA bilaterally, non-labored breathing, no cough during exam, on RA  Cardiovascular: Regular rate and rhythm, + systolic murmur  Gastrointestinal: Nondistended, soft, non-tender, BS present x 4  : no Dumont, no SP discomfort  Musculoskeletal: ROM intact, no joint swelling  Extremities: normal extremities, no edema  Neurological: A&O x 3, speech clear, follows commands appropriately, cr. n. grossly intact, sensation grossly intact, motor 5/5 throughout  Skin: Warm and dry, no lesions, no rashes  Psych: calm, stable mood      Last Recorded Vitals  Blood pressure (!) 133/49, pulse 56, temperature 36.7 °C (98 °F), temperature source Temporal, resp. rate 25, weight 75.8 kg (167 lb 1.7 oz), SpO2 98%.  Intake/Output last 3 Shifts:  I/O last 3 completed shifts:  In: - (0 mL/kg)   Out: 935 (12.3 mL/kg) [Urine:935 (0.3 mL/kg/hr)]  Weight: 75.8 kg     Relevant Results            This patient currently has cardiac telemetry ordered; if you would like to modify or discontinue the telemetry order, click here to go to the orders activity to modify/discontinue the order.      Scheduled medications  aspirin, 81 mg, oral, Daily  atorvastatin, 80 mg, oral, Daily  enoxaparin, 40 mg, subcutaneous, q24h  insulin lispro, 0-5 Units, subcutaneous, TID AC  lisinopril, 40 mg, oral, Daily  perflutren lipid microspheres, 0.5-10 mL of dilution, intravenous, Once in imaging  perflutren protein A microsphere, 0.5 mL, intravenous, Once in imaging  polyethylene glycol, 17  g, oral, Daily  sennosides-docusate sodium, 1 tablet, oral, Nightly  simethicone, 160 mg, oral, 4x daily  sulfur hexafluoride microsphr, 2 mL, intravenous, Once in imaging  ticagrelor, 90 mg, oral, BID      Continuous medications     PRN medications  PRN medications: acetaminophen, albuterol, benzocaine-menthol, dextrose, dextrose, fluticasone, glucagon, glucagon, guaiFENesin, nitroglycerin, ondansetron     Results for orders placed or performed during the hospital encounter of 01/12/25 (from the past 24 hours)   POCT GLUCOSE   Result Value Ref Range    POCT Glucose 108 (H) 74 - 99 mg/dL   Renal Function Panel   Result Value Ref Range    Glucose 143 (H) 74 - 99 mg/dL    Sodium 137 136 - 145 mmol/L    Potassium 3.9 3.5 - 5.3 mmol/L    Chloride 106 98 - 107 mmol/L    Bicarbonate 26 21 - 32 mmol/L    Anion Gap 9 (L) 10 - 20 mmol/L    Urea Nitrogen 11 6 - 23 mg/dL    Creatinine 0.84 0.50 - 1.05 mg/dL    eGFR 75 >60 mL/min/1.73m*2    Calcium 8.4 (L) 8.6 - 10.3 mg/dL    Phosphorus 3.1 2.5 - 4.9 mg/dL    Albumin 3.3 (L) 3.4 - 5.0 g/dL   CBC   Result Value Ref Range    WBC 7.9 4.4 - 11.3 x10*3/uL    nRBC 0.0 0.0 - 0.0 /100 WBCs    RBC 3.47 (L) 4.00 - 5.20 x10*6/uL    Hemoglobin 8.2 (L) 12.0 - 16.0 g/dL    Hematocrit 27.1 (L) 36.0 - 46.0 %    MCV 78 (L) 80 - 100 fL    MCH 23.6 (L) 26.0 - 34.0 pg    MCHC 30.3 (L) 32.0 - 36.0 g/dL    RDW 18.5 (H) 11.5 - 14.5 %    Platelets 203 150 - 450 x10*3/uL   Magnesium   Result Value Ref Range    Magnesium 1.94 1.60 - 2.40 mg/dL   SST TOP   Result Value Ref Range    Extra Tube Hold for add-ons.    Iron and TIBC   Result Value Ref Range    Iron 26 (L) 35 - 150 ug/dL    UIBC 295 110 - 370 ug/dL    TIBC 321 240 - 445 ug/dL    % Saturation 8 (L) 25 - 45 %   Ferritin   Result Value Ref Range    Ferritin 30 8 - 150 ng/mL   Vitamin B12   Result Value Ref Range    Vitamin B12 299 211 - 911 pg/mL   Folate   Result Value Ref Range    Folate, Serum 10.3 >5.0 ng/mL   POCT GLUCOSE   Result Value Ref  "Range    POCT Glucose 118 (H) 74 - 99 mg/dL   POCT GLUCOSE   Result Value Ref Range    POCT Glucose 90 74 - 99 mg/dL     No results found.             Assessment/Plan   Assessment & Plan  ST elevation myocardial infarction (STEMI), unspecified artery (Multi)    Marsha Tapia is a 70 yo female with PMH of breast cancer s/p mastectomy, chemo, and radiation, DM, HTN, HLD, GERD, gastric bypass, who presented to [facility] ED with chest pain, nausea/vomiting, fatigue.    She had upper respiratory symptoms for the past few days, was seen at urgent care on 1/9/25, diagnosed with URI and prescribed augmentin.  She continued to feel worse and ultimately called 911 d/t chest pain, vomiting, and SOB.  In ED, she was found to have ST elevation in anterior leads.  STEMI alert was activated; she was taken to cath lab and LHC revealed mid 100% thrombotic occlusion now s/p aspiration thrombectomy and PCI with Synergy 2.5 x 32 LIAM.      STEMI now s/p aspiration thrombectomy and PCI with Synergy 2.5 x 32 LIAM  - troponin peaked at >125,000  - cardiology following  - plan for aspirin/Brillinta x 1 year without interruption followed by aspirin monotherapy   - continue high intensity statin   - metoprolol held in AM due to bradycardia   - TTE is pending    - referred to cardiac rehab    Aberrant right subclavian artery   - unable to do radial approach for C; right femoral accessed instead   - CT revealed \"stable appearance of an aberrant right subclavian artery with a retroesophageal course. There is apparent stenosis of the right subclavian artery and vein, likely due to compression between the clavicle and rib\"   - [facility] with [facility]; to follow as outpatient in 3 months with periodic serial CT scans     COVID-19 Infection   RSV Infection   - continue supportive care, mucolytics, cough suppressants, analgesics, albuterol inhaler   - not a candidate for inpatient COVID therapies; remains on room air   - Augmentin is " discontinued as low suspicion for concomitant bacterial infection. CT imaging shows mild peribronchial thickening and atelectasis, negative for consolidation     HTN   - BP variable/soft at times.  Continue lisinopril 20 mg daily. Continue to hold amlodipine     DM2   - A1c 7.3  - sliding scale insulin, hypoglycemia protocol     GERD   - stable     VTE/GI prophylaxis   - subcutaneous lovenox   - bowel regimen in place     Discharge planning   - plan to discharge home when medically stable   - SW notified regarding questions about Meals on Wheels     Discussed with Frantz Vargas (cardiology JULY)            I spent 45 minutes in the professional and overall care of this patient.      Gail Kirby, APRN-CNP

## 2025-01-14 NOTE — CARE PLAN
Problem: Pain - Adult  Goal: Verbalizes/displays adequate comfort level or baseline comfort level  Outcome: Progressing     Problem: Safety - Adult  Goal: Free from fall injury  Outcome: Progressing     Problem: Respiratory  Goal: Clear secretions with interventions this shift  Outcome: Progressing  Goal: Minimize anxiety/maximize coping throughout shift  Outcome: Progressing  Goal: Minimal/no exertional discomfort or dyspnea this shift  Outcome: Progressing  Goal: No signs of respiratory distress (eg. Use of accessory muscles. Peds grunting)  Outcome: Progressing   The patient's goals for the shift include      The clinical goals for the shift include monitor vs, labs, I&O's; manage pain; promote safe ambulation with assistance; rest

## 2025-01-14 NOTE — CARE PLAN
Problem: Pain - Adult  Goal: Verbalizes/displays adequate comfort level or baseline comfort level  Outcome: Progressing     Problem: Safety - Adult  Goal: Free from fall injury  Outcome: Progressing     Problem: Respiratory  Goal: Clear secretions with interventions this shift  Outcome: Progressing  Goal: Minimize anxiety/maximize coping throughout shift  Outcome: Progressing  Goal: Minimal/no exertional discomfort or dyspnea this shift  Outcome: Progressing  Goal: No signs of respiratory distress (eg. Use of accessory muscles. Peds grunting)  Outcome: Progressing   The patient's goals for the shift include      The clinical goals for the shift include pt will remain hemodynamically stable this shift

## 2025-01-14 NOTE — PROGRESS NOTES
Care Coordinator Note:    Plan: Patient s/p STEMI with PCI. Covid positive.on brilinta. Echo, pending cardio clearance  Status: inpatient  Payor: [employer]  Disposition: Home alone no needs  Barrier: cardio   ADOD: ?tomorrow    Coral Gordillo [facility]      01/14/25 1310   Discharge Planning   Expected Discharge Disposition Home   Intensity of Service   Intensity of Service 0-30 min

## 2025-01-14 NOTE — PROGRESS NOTES
Marsha Tapia is a 69 y.o. female     Positive for RSV and COVID  However asymptomatic as respiratory symptoms:  Echocardiogram pending  Denies any chest pain shortness palpitations    Review of Systems     Constitutional: no fever, no chills, not feeling poorly, not feeling tired   Cardiovascular: no chest pain   Respiratory: no cough, wheezing or shortness of breath a  Gastrointestinal: no abdominal pain, no constipation, no melena, no nausea, no diarrhea, no vomiting and no blood in stools.   Neurological: no headache,   All other systems have been reviewed and are negative for complaint.       Vitals:    01/14/25 1218   BP: (!) 137/49   Pulse: 56   Resp: 25   Temp: 36.2 °C (97.2 °F)   SpO2: 98%        Scheduled medications  [Held by provider] amLODIPine, 10 mg, oral, Daily   And  [Held by provider] lisinopril, 40 mg, oral, Daily  aspirin, 81 mg, oral, Daily  atorvastatin, 80 mg, oral, Daily  enoxaparin, 40 mg, subcutaneous, q24h  insulin lispro, 0-5 Units, subcutaneous, TID AC  lisinopril, 40 mg, oral, Daily  perflutren lipid microspheres, 0.5-10 mL of dilution, intravenous, Once in imaging  perflutren protein A microsphere, 0.5 mL, intravenous, Once in imaging  polyethylene glycol, 17 g, oral, Daily  sennosides-docusate sodium, 1 tablet, oral, Nightly  simethicone, 160 mg, oral, 4x daily  sulfur hexafluoride microsphr, 2 mL, intravenous, Once in imaging  ticagrelor, 90 mg, oral, BID      Continuous medications     PRN medications  PRN medications: acetaminophen, albuterol, benzocaine-menthol, dextrose, dextrose, fluticasone, glucagon, glucagon, guaiFENesin, nitroglycerin, ondansetron    Lab Review   Results from last 7 days   Lab Units 01/14/25  0606 01/13/25  0858 01/12/25  0512   WBC AUTO x10*3/uL 7.9 9.0 7.1   HEMOGLOBIN g/dL 8.2* 9.1* 9.7*   HEMATOCRIT % 27.1* 29.6* 33.2*   PLATELETS AUTO x10*3/uL 203 214 231     Results from last 7 days   Lab Units 01/14/25  0606 01/13/25  0539 01/12/25  0512   SODIUM  mmol/L 137 136 133*   POTASSIUM mmol/L 3.9 3.4* 4.8   CHLORIDE mmol/L 106 108* 103   CO2 mmol/L 26 24 23   BUN mg/dL 11 10 11   CREATININE mg/dL 0.84 0.67 0.70   CALCIUM mg/dL 8.4* 7.6* 8.2*   PROTEIN TOTAL g/dL  --   --  6.7   BILIRUBIN TOTAL mg/dL  --   --  0.4   ALK PHOS U/L  --   --  102   ALT U/L  --   --  8   AST U/L  --   --  13   GLUCOSE mg/dL 143* 129* 194*     Results from last 7 days   Lab Units 01/13/25  0539 01/13/25  0036 01/12/25 2013   TROPHS ng/L 61,834* 78,957* 69,505*        CT angio chest abdomen pelvis   Final Result   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 There is apparent stenosis of the right   subclavian artery and vein, likely due to compression between the   clavicle and rib.        Similar appearance of left breast skin thickening and a 1.9 cm   retroareolar nodule that now contains a punctate calcification.   Correlate with any prior mammographic studies to exclude inflammatory   neoplasm.        Enlarged right paratracheal lymph node as well as mildly prominent   mediastinal lymph nodes are indeterminate may be reactive or   neoplastic.        Cardiomegaly with coronary artery calcifications and a stent within   the LAD.        Findings of mild pulmonary vascular congestion within the lungs.        Additional stable chronic findings, as above.        Signed by: Enzo Meraz 1/13/2025 11:06 AM   Dictation workstation:   [id number]      Cardiac Catheterization Procedure   Final Result      XR chest 1 view   Final Result   No overt edema, pneumothorax, pleural effusion, or focal   consolidation.    Signed by John Morrison MD      Transthoracic Echo (TTE) Complete    (Results Pending)   BI mammo bilateral diagnostic    (Results Pending)         Physical Exam    Constitutional   General appearance: Alert and in no acute distress.   Pulmonary   Respiratory assessment: No respiratory distress, normal respiratory rhythm and effort.     Auscultation of Lungs: Clear bilateral breath sounds.   Cardiovascular   Auscultation of heart: Apical pulse normal, heart rate and rhythm normal, normal S1 and S2, no murmurs and no pericardial rub.    Exam for edema: No peripheral edema.   Abdomen   Abdominal Exam: No bruits, normal bowel sounds, soft, non-tender, no abdominal mass palpated.    Liver and Spleen exam: No hepato-splenomegaly.   Musculoskeletal   Examination of gait: Normal.    Inspection of digits and nails: No clubbing or cyanosis of the fingernails.    Inspection/palpation of joints, bones and muscles: No joint swelling. Normal movement of all extremities.   Neurologic   Cranial nerves: Nerves 2-12 were intact, no focal neuro defects.         Assessment/Plan      #STEMI  Secondary to thrombosed blood vessel  Currently chest pain-free and no shortness of breath  On aspirin and Brilinta  Echocardiogram pending    #RSV  #COVID  Asymptomatic  Will hold off on remdesivir and steroids    #Breast abnormality on CAT scan  Mammogram is ordered  #Dyslipidemia  Target LDL less than 70    #Diabetes mellitus  Current A1c is 7.0  Will initiate SGLT2    #Hypertension  Stable continue home medications

## 2025-01-15 ENCOUNTER — PHARMACY VISIT (OUTPATIENT)
Dept: PHARMACY | Facility: CLINIC | Age: 70
End: 2025-01-15
Payer: COMMERCIAL

## 2025-01-15 VITALS
SYSTOLIC BLOOD PRESSURE: 159 MMHG | DIASTOLIC BLOOD PRESSURE: 85 MMHG | OXYGEN SATURATION: 97 % | TEMPERATURE: 98.2 F | WEIGHT: 167.11 LBS | BODY MASS INDEX: 28.68 KG/M2 | HEART RATE: 88 BPM | RESPIRATION RATE: 18 BRPM

## 2025-01-15 LAB
ADENOVIRUS RVP, VIRC: NOT DETECTED
ALBUMIN SERPL BCP-MCNC: 3.3 G/DL (ref 3.4–5)
ANION GAP SERPL CALC-SCNC: 10 MMOL/L (ref 10–20)
BUN SERPL-MCNC: 12 MG/DL (ref 6–23)
CALCIUM SERPL-MCNC: 8.4 MG/DL (ref 8.6–10.3)
CHLORIDE SERPL-SCNC: 106 MMOL/L (ref 98–107)
CO2 SERPL-SCNC: 27 MMOL/L (ref 21–32)
CREAT SERPL-MCNC: 0.8 MG/DL (ref 0.5–1.05)
EGFRCR SERPLBLD CKD-EPI 2021: 80 ML/MIN/1.73M*2
ENTEROVIRUS/RHINOVIRUS RVP, VIRC: NOT DETECTED
ERYTHROCYTE [DISTWIDTH] IN BLOOD BY AUTOMATED COUNT: 18.4 % (ref 11.5–14.5)
GLUCOSE SERPL-MCNC: 114 MG/DL (ref 74–99)
HCT VFR BLD AUTO: 29.9 % (ref 36–46)
HGB BLD-MCNC: 9.3 G/DL (ref 12–16)
HUMAN BOCAVIRUS RVP, VIRC: NOT DETECTED
HUMAN CORONAVIRUS RVP, VIRC: NOT DETECTED
INFLUENZA A , VIRC: NOT DETECTED
INFLUENZA A H1N1-09 , VIRC: NOT DETECTED
INFLUENZA B PCR, VIRC: NOT DETECTED
MAGNESIUM SERPL-MCNC: 2.02 MG/DL (ref 1.6–2.4)
MCH RBC QN AUTO: 24 PG (ref 26–34)
MCHC RBC AUTO-ENTMCNC: 31.1 G/DL (ref 32–36)
MCV RBC AUTO: 77 FL (ref 80–100)
METAPNEUMOVIRUS , VIRC: NOT DETECTED
NRBC BLD-RTO: 0 /100 WBCS (ref 0–0)
PARAINFLUENZA PCR, VIRC: NOT DETECTED
PHOSPHATE SERPL-MCNC: 4 MG/DL (ref 2.5–4.9)
PLATELET # BLD AUTO: 207 X10*3/UL (ref 150–450)
POTASSIUM SERPL-SCNC: 4.8 MMOL/L (ref 3.5–5.3)
RBC # BLD AUTO: 3.88 X10*6/UL (ref 4–5.2)
RSV PCR, RVP, VIRC: NOT DETECTED
SODIUM SERPL-SCNC: 138 MMOL/L (ref 136–145)
WBC # BLD AUTO: 7.5 X10*3/UL (ref 4.4–11.3)

## 2025-01-15 PROCEDURE — 80069 RENAL FUNCTION PANEL: CPT | Performed by: STUDENT IN AN ORGANIZED HEALTH CARE EDUCATION/TRAINING PROGRAM

## 2025-01-15 PROCEDURE — 99239 HOSP IP/OBS DSCHRG MGMT >30: CPT | Performed by: INTERNAL MEDICINE

## 2025-01-15 PROCEDURE — 2500000002 HC RX 250 W HCPCS SELF ADMINISTERED DRUGS (ALT 637 FOR MEDICARE OP, ALT 636 FOR OP/ED): Performed by: STUDENT IN AN ORGANIZED HEALTH CARE EDUCATION/TRAINING PROGRAM

## 2025-01-15 PROCEDURE — 2500000001 HC RX 250 WO HCPCS SELF ADMINISTERED DRUGS (ALT 637 FOR MEDICARE OP): Performed by: STUDENT IN AN ORGANIZED HEALTH CARE EDUCATION/TRAINING PROGRAM

## 2025-01-15 PROCEDURE — 36415 COLL VENOUS BLD VENIPUNCTURE: CPT | Performed by: STUDENT IN AN ORGANIZED HEALTH CARE EDUCATION/TRAINING PROGRAM

## 2025-01-15 PROCEDURE — 83735 ASSAY OF MAGNESIUM: CPT | Performed by: STUDENT IN AN ORGANIZED HEALTH CARE EDUCATION/TRAINING PROGRAM

## 2025-01-15 PROCEDURE — 82947 ASSAY GLUCOSE BLOOD QUANT: CPT

## 2025-01-15 PROCEDURE — 2500000001 HC RX 250 WO HCPCS SELF ADMINISTERED DRUGS (ALT 637 FOR MEDICARE OP)

## 2025-01-15 PROCEDURE — 2500000004 HC RX 250 GENERAL PHARMACY W/ HCPCS (ALT 636 FOR OP/ED): Performed by: NURSE PRACTITIONER

## 2025-01-15 PROCEDURE — 99233 SBSQ HOSP IP/OBS HIGH 50: CPT | Performed by: NURSE PRACTITIONER

## 2025-01-15 PROCEDURE — 85027 COMPLETE CBC AUTOMATED: CPT | Performed by: STUDENT IN AN ORGANIZED HEALTH CARE EDUCATION/TRAINING PROGRAM

## 2025-01-15 RX ADMIN — SIMETHICONE 160 MG: 80 TABLET, CHEWABLE ORAL at 12:23

## 2025-01-15 RX ADMIN — ENOXAPARIN SODIUM 40 MG: 40 INJECTION SUBCUTANEOUS at 12:23

## 2025-01-15 RX ADMIN — LISINOPRIL 40 MG: 20 TABLET ORAL at 08:57

## 2025-01-15 RX ADMIN — TICAGRELOR 90 MG: 90 TABLET ORAL at 08:57

## 2025-01-15 RX ADMIN — ASPIRIN 81 MG: 81 TABLET, COATED ORAL at 08:57

## 2025-01-15 RX ADMIN — ATORVASTATIN CALCIUM 80 MG: 80 TABLET, FILM COATED ORAL at 08:57

## 2025-01-15 ASSESSMENT — PAIN SCALES - GENERAL
PAINLEVEL_OUTOF10: 0 - NO PAIN
PAINLEVEL_OUTOF10: 0 - NO PAIN

## 2025-01-15 ASSESSMENT — PAIN - FUNCTIONAL ASSESSMENT: PAIN_FUNCTIONAL_ASSESSMENT: 0-10

## 2025-01-15 NOTE — DISCHARGE SUMMARY
"Discharge Diagnosis  ST elevation myocardial infarction (STEMI), unspecified artery (Multi)    Issues Requiring Follow-Up  Cardiology follow-up  CT surgery follow-up to monitor aneurysm    Test Results Pending At Discharge  Pending Labs       Order Current Status    Extra Urine Gray Tube Collected (01/12/25 1950)    Respiratory Viral Panel In process    Urinalysis with Reflex Culture and Microscopic In process            Hospital Course  Patient with a past medical history significant for acid reflux disease hypertension dyslipidemia diabetes mellitus history of breast carcinoma presented to the hospital with increasing fatigue shortness of breath and chest pain  She was previously treated at an urgent care center for upper respite tract infections  Workup in the emergency room showed elevated troponins and concerns for STEMI as evidenced by ST elevations in anterior leads  Was stabilized in the ER and taken to the Cath Lab  Cath Lab left heart cath showed 100% mid LAD thrombosis with total occlusion this was addressed with aspiration thrombectomy and a stent  Was transferred out to the ICU in stable condition and then transferred to the regular floor  Patient also tested positive for both RSV and COVID  However denies any shortness of breath or rhinorrhea  Patient has remained chest pain-free     CT shows \"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 There is apparent stenosis of the right subclavian artery and vein, likely due to compression between the clavicle and rib\"      Dr. Dunphy reviewed imaging; said with these cases the most concerning thing is to evaluate for aneurysm, which is not seen on her imaging. She recommends follow up with herself in 3 months as an outpatient.  She will need periodic serial CT scans.      Pertinent Physical Exam At Time of Discharge  Physical Exam    Constitutional   General appearance: Alert and in no acute distress.     Pulmonary " "  Respiratory assessment: No respiratory distress, normal respiratory rhythm and effort.    Auscultation of Lungs: Clear bilateral breath sounds.   Cardiovascular   Auscultation of heart: Apical pulse normal, heart rate and rhythm normal, normal S1 and S2, no murmurs and no pericardial rub.    Exam for edema: No peripheral edema.   Abdomen   Abdominal Exam: No bruits, normal bowel sounds, soft, non-tender, no abdominal mass palpated.    Liver and Spleen exam: No hepato-splenomegaly.   Musculoskeletal   Examination of gait: Normal.    Inspection of digits and nails: No clubbing or cyanosis of the fingernails.    Inspection/palpation of joints, bones and muscles: No joint swelling. Normal movement of all extremities.   Skin   Skin inspection: Normal skin color and pigmentation, normal skin turgor and no visible rash.   Neurologic   Cranial nerves: Nerves 2-12 were intact, no focal neuro defects.    Home Medications     Medication List      START taking these medications     aspirin 81 mg EC tablet; Take 1 tablet (81 mg) by mouth once daily.   lisinopril 40 mg tablet; Take 1 tablet (40 mg) by mouth once daily.   ticagrelor 90 mg tablet; Commonly known as: Brilinta; Take 1 tablet (90   mg) by mouth 2 times a day.     CHANGE how you take these medications     atorvastatin 80 mg tablet; Commonly known as: Lipitor; Take 1 tablet (80   mg) by mouth once daily.; What changed: medication strength, how much to   take     CONTINUE taking these medications     Allevyn 2 X 2 \" bandage; Generic drug: foam bandage; Apply to right   axilla as directed   docusate sodium 100 mg capsule; Commonly known as: Colace   fluticasone 50 mcg/actuation nasal spray; Commonly known as: Flonase   metFORMIN 500 mg tablet; Commonly known as: Glucophage; Take 1 tablet   (500 mg) by mouth 2 times daily (morning and late afternoon).   nitroglycerin 0.4 mg SL tablet; Commonly known as: Nitrostat     STOP taking these medications     acetaminophen 325 mg " tablet; Commonly known as: Tylenol   amLODIPine-benazepriL 10-40 mg capsule; Commonly known as: Lotrel   betamethasone dipropionate 0.05 % ointment; Commonly known as: Diprosone   cephalexin 500 mg tablet; Commonly known as: Keftab   cetirizine 10 mg tablet; Commonly known as: ZyrTEC   cyclobenzaprine 10 mg tablet; Commonly known as: Flexeril   doxycycline 100 mg capsule; Commonly known as: Vibramycin   hydroCHLOROthiazide 12.5 mg tablet; Commonly known as: Microzide   ibuprofen 600 mg tablet   lidocaine 5 % patch; Commonly known as: Lidoderm   mupirocin 2 % ointment; Commonly known as: Bactroban   nystatin 100,000 unit/gram powder; Commonly known as: Mycostatin   predniSONE 20 mg tablet; Commonly known as: Deltasone   triamcinolone 0.1 % cream; Commonly known as: Kenalog       Outpatient Follow-Up  Future Appointments   Date Time Provider Department Center   1/21/2025  8:20 AM Mary JainD [id number] [facility]   [facility]/23/2025  [facility]   1/24/2025 11:20 AM MIN ECHO/STRESS [id number] [facility]   2/3/2025  9:20 AM Srinivas Perez MD UCR1 [facility]     Patient seen at bedside. Events from the last visit reviewed. Discussed with staff. Results of tests and investigations from last visit reviewed and discussed with patient/Family. Electronic chart on [facility] reviewed. Input / Recommendations  from consultants  appreciated and reviewed and agreed with.     discharge summary and profile completed. medications reviewed and discussed with patient and family.  scripts completed and signed.     total discharge time in excess of 30 minutes.    Stephane Sandhu MD

## 2025-01-15 NOTE — CARE PLAN
The patient's goals for the shift include      The clinical goals for the shift include pt will remain hemodynamically stable      Problem: Pain - Adult  Goal: Verbalizes/displays adequate comfort level or baseline comfort level  Outcome: Progressing     Problem: Respiratory  Goal: Clear secretions with interventions this shift  Outcome: Progressing  Goal: Minimize anxiety/maximize coping throughout shift  Outcome: Progressing  Goal: Minimal/no exertional discomfort or dyspnea this shift  Outcome: Progressing  Goal: No signs of respiratory distress (eg. Use of accessory muscles. Peds grunting)  Outcome: Progressing

## 2025-01-15 NOTE — PROGRESS NOTES
"  Pharmacy Post-Discharge Visit    Marsha Tapia is a 69 y.o. female who was referred to Clinical Pharmacy Team to complete a post-discharge medication optimization and monitoring visit.  The patient was referred for their Diabetes and Post discharge.    Pt is here for First appointment.     Admission Date: 1/12/2025  Discharge Date: 1/15/2025    PCP/Referring Provider: Stephane Sandhu MD  Last Visit: 10/25/2025  Next visit: TBD    Subjective   HPI  STEMI  New to [facility] post STEMI, requiring cost assistance. Will screen for [facility] PAP    Type II Diabetes  Last A1c 7.3%  Current  Pharmacotherapy:   Metformin 500 mg BID     SECONDARY PREVENTION  - Statin? Atorvastatin 80 mg   LDL:   TC:  - ACE-I/ARB? Lisinopril 40 mg    UACR:   BP:   - Aspirin?  Yes    -The ASCVD Risk score (Colton POWELL, et al., 2019) failed to calculate for the following reasons:    Risk score cannot be calculated because patient has a medical history suggesting prior/existing ASCVD     Notable Medication changes following discharge  Start:   Aspirin 81 mg daily  Lisinopril 40 mg daily  Ticagrelor 90 mg BID  Stop:   Amlodipine-benazepril 10-40 mg  Cetirizine 10 mg  Cyclobenzaprine 10 mg   Hydrochlorothiazide 12.5 mg  Prednisone 20 mg   Change:  Atorvastatin 20 mg to 80 mg daily    Drug Interactions  No relevant drug interactions were noted.    Medication System Management  Patient[facility] pharmacy:  [facility]   Adherence/Organization: no concerns  Affordability/Accessibility: [facility] for Brilinta      Objective   No Known Allergies  Social History     Social History Narrative    Not on file      Medication Review  Current Outpatient Medications   Medication Instructions    aspirin 81 mg, oral, Daily    atorvastatin (LIPITOR) 80 mg, oral, Daily    Brilinta 90 mg, oral, 2 times daily    docusate sodium (COLACE) 100 mg, 2 times daily    fluticasone (Flonase) 50 mcg/actuation nasal spray 2 sprays, Daily    foam bandage (Allevyn) 2 X 2 \" " "bandage Apply to right axilla as directed    lisinopril 40 mg, oral, Daily    metFORMIN (GLUCOPHAGE) 500 mg, oral, 2 times daily (morning and late afternoon)    nitroglycerin (NITROSTAT) 0.4 mg, Every 5 min PRN      Vitals  BP Readings from Last 2 Encounters:   01/15/25 159/85   10/29/24 160/72     BMI Readings from Last 1 Encounters:   01/14/25 28.68 kg/m²      Labs  A1C  Lab Results   Component Value Date    HGBA1C 7.3 (H) 01/12/2025    HGBA1C 7.0 (A) 10/25/2024    HGBA1C 7.0 (H) 04/16/2024     BMP  Lab Results   Component Value Date    CALCIUM 8.4 (L) 01/15/2025     01/15/2025    K 4.8 01/15/2025    CO2 27 01/15/2025     01/15/2025    BUN 12 01/15/2025    CREATININE 0.80 01/15/2025    EGFR 80 01/15/2025     LFTs  Lab Results   Component Value Date    ALT 8 01/12/2025    AST 13 01/12/2025    ALKPHOS 102 01/12/2025    BILITOT 0.4 01/12/2025     FLP  Lab Results   Component Value Date    TRIG 67 01/12/2025    CHOL 160 01/12/2025    LDLF 120 (H) 11/09/2022    LDLCALC 104 (H) 01/12/2025    HDL 42.3 01/12/2025     Urine Microalbumin  No results found for: \"MICROALBCREA\"  Wt Readings from Last 3 Encounters:   01/14/25 75.8 kg (167 lb 1.7 oz)   10/29/24 77.4 kg (170 lb 9.6 oz)   10/25/24 76 kg (167 lb 9.6 oz)      There is no height or weight on file to calculate BMI.       Assessment/Plan   Problem List Items Addressed This Visit       DM (diabetes mellitus), type 2 (Multi)    ST elevation myocardial infarction (STEMI), unspecified artery (Multi)     Patient will be faxing [phone] form for  PAP application for Brilinta cost assistance. Addressed all questions and concerns about medication post discharge.    Clinical Pharmacist follow-up: PRN upon request of patient or provider    Continue all meds under the continuation of care with the referring provider and clinical pharmacy team.    Thank you,  Omer Puri, Jocelyne    Verbal consent to manage patient's drug therapy was obtained from the patient. They " were informed they may decline to participate or withdraw from participation in pharmacy services at any time.

## 2025-01-15 NOTE — PROGRESS NOTES
"Subjective Data:  No chest pain or shortness of breath   Discussed echocardiogram findings    Telemetry SB/SR PVCs     Overnight Events:    N/A     Objective Data:  Last Recorded Vitals:  Vitals:    01/15/25 0400 01/15/25 0405 01/15/25 0500 01/15/25 0850   BP:  (!) 126/46  (!) 133/45   BP Location:    Right arm   Patient Position:    Lying   Pulse:  57 51 50   Resp:  11 13 16   Temp: 36.2 °C (97.2 °F)   35.7 °C (96.2 °F)   TempSrc: Temporal   Temporal   SpO2:  98% 100% 92%   Weight:           Last Labs:  LABS:  CMP:  Results from last 7 days   Lab Units 01/15/25  0638 01/14/25  0606 01/13/25  0539 01/12/25  0512   SODIUM mmol/L 138 137 136 133*   POTASSIUM mmol/L 4.8 3.9 3.4* 4.8   CHLORIDE mmol/L 106 106 108* 103   CO2 mmol/L 27 26 24 23   ANION GAP mmol/L 10 9* 7* 12   BUN mg/dL 12 11 10 11   CREATININE mg/dL 0.80 0.84 0.67 0.70   EGFR mL/min/1.73m*2 80 75 >90 >90   MAGNESIUM mg/dL 2.02 1.94 1.91 1.64   ALBUMIN g/dL 3.3* 3.3* 3.0* 3.6   ALT U/L  --   --   --  8   AST U/L  --   --   --  13   BILIRUBIN TOTAL mg/dL  --   --   --  0.4     CBC:  Results from last 7 days   Lab Units 01/15/25  0638 01/14/25  0606 01/13/25  0858 01/12/25  0512   WBC AUTO x10*3/uL 7.5 7.9 9.0 7.1   HEMOGLOBIN g/dL 9.3* 8.2* 9.1* 9.7*   HEMATOCRIT % 29.9* 27.1* 29.6* 33.2*   PLATELETS AUTO x10*3/uL 207 203 214 231   MCV fL 77* 78* 78* 80     COAG:     ABO: No results found for: \"ABO\"  HEME/ENDO:  Results from last 7 days   Lab Units 01/14/25  0606 01/12/25  0633 01/12/25  0512   FERRITIN ng/mL 30  --   --    IRON SATURATION % 8*  --   --    TSH mIU/L  --  3.09  --    HEMOGLOBIN A1C %  --   --  7.3*      CARDIAC:   Results from last 7 days   Lab Units 01/13/25  0539 01/13/25  0036 01/12/25 2013 01/12/25  1621 01/12/25  0633 01/12/25  0512   TROPHS ng/L 61,834* 78,957* 69,505* >125,000* 135* 122*   BNP pg/mL  --   --   --   --   --  217*     Recent Labs     01/12/25  0633 04/16/24  0835 11/09/22  0817 04/27/21  1215 10/19/20  0731   CHOL 160 " 220* 182 206* 212*   LDLF  --   --  120* 140* 155*   LDLCALC 104* 156*  --   --   --    HDL 42.3 50.7 52.0 49.6 40.6   TRIG 67 69 52 80 80      Last I/O:  I/O last 3 completed shifts:  In: 458 (6 mL/kg) [P.O.:458]  Out: - (0 mL/kg)   Weight: 75.8 kg     Past Cardiology Tests (Last 3 Years):  EKG:  ECG 12 lead 01/12/2025     Echo:  Transthoracic Echo (TTE) 1/14/2025   1. This is a limited study.   2. Poorly visualized anatomical structures due to suboptimal image quality.   3. Left ventricular ejection fraction is normal, calculated by Dorado's biplane at 60%.   4. Abnormal left venticular wall motion.   5. Entire septum is abnormal.   6. Spectral Doppler shows an abnormal pattern of left ventricular diastolic filling with an elevated left atrial pressure.   7. There is normal right ventricular global systolic function.   8. The left atrium is enlarged.   9. Mild to moderate aortic valve stenosis.       Transthoracic Echo (TTE) Complete 11/04/2024   1. Left ventricular ejection fraction is normal, by visual estimate at 65-70%.   2. Spectral Doppler shows a Grade II (pseudonormal pattern) of left ventricular diastolic filling with an elevated left atrial pressure.   3. There is moderately increased septal and moderately increased posterior left ventricular wall thickness.   4. There is severely increased concentric left ventricular hypertrophy.   5. There is normal right ventricular global systolic function.   6. The left atrium is severely dilated.   7. The right atrium is moderately dilated.   8. Moderate aortic valve stenosis.   9. There is moderate aortic valve cusp calcification.    7/21/2021   1. The left ventricular systolic function is normal with a 70-75% estimated ejection fraction.   2. Poorly visualized anatomical structures due to suboptimal image quality.   3. Spectral Doppler shows a pseudonormal pattern of left ventricular diastolic filling.   4. Increased LV mass.   5. The left atrium is severely  dilated.   6. Mild aortic valve stenosis.      Ejection Fractions:  EF   Date/Time Value Ref Range Status   01/14/2025 05:20 PM 60 %    11/04/2024 10:34 AM 68 %      Cath:  Cardiac Catheterization Procedure 01/12/2025  LM: no obstructive CAD            LAD: mid 100% thrombotic occlusion now s/p aspiration thrombectomy and PCI with Synergy 2.5 x 32 LIAM  LCX: mid 50% stenosis  RCA: no obstructive CAD      Stress Test:  Nuclear Stress Test 2021  1. Relatively normal stress myocardial perfusion imaging in response to pharmacologic stress in the setting of moderate breast attenuation.  2. Well-maintained left ventricular function.    Cardiac Imaging:  CT Heart Calcium Score 2021  1. Coronary artery calcium score of 208*.     Coronary Artery Score            Annual Risk   0-99                                         0.4%  100-399                                     1.3%  >400                                          2.4%    Inpatient Medications:  Scheduled medications   Medication Dose Route Frequency    aspirin  81 mg oral Daily    atorvastatin  80 mg oral Daily    enoxaparin  40 mg subcutaneous q24h    insulin lispro  0-5 Units subcutaneous TID AC    lisinopril  40 mg oral Daily    perflutren protein A microsphere  0.5 mL intravenous Once in imaging    polyethylene glycol  17 g oral Daily    sennosides-docusate sodium  1 tablet oral Nightly    simethicone  160 mg oral 4x daily    sulfur hexafluoride microsphr  2 mL intravenous Once in imaging    ticagrelor  90 mg oral BID     PRN medications   Medication    acetaminophen    albuterol    benzocaine-menthol    dextrose    dextrose    fluticasone    glucagon    glucagon    guaiFENesin    nitroglycerin    ondansetron     Continuous Medications   Medication Dose Last Rate       Physical Exam:  GENERAL: alert, cooperative, pleasant, in no acute distress  SKIN: warm, dry Right radial cath site with no ooze or hematoma   NECK: no JVD  CARDIAC: Regular rate and rhythm 2/6  systolic murmur   PULMONARY: Normal respiratory efforts, lungs clear to auscultation bilaterally.  ABDOMEN: soft, nondistended  EXTREMITIES: no lower extremity edema  NEURO: Alert and oriented x 3.  Grossly normal.  Moves all 4 extremities.        Assessment/Plan   Marsha Tapia is a 69 y.o. female, with a PMH of HTN, HLD, asymptomatic sinus bradycardia, T2DM, breast CA s/p mastectomy/chemo/radiation, IgA deficiency, aortic stenosis, OA, GERD, and hx of gastric bypass, who presented to [facility] on 1/12/2025 for chest pain. Patient reports she was initially seen in urgent care on 1/9 for URI symptoms- nasal congestion, cough, rhinorrhea, headache, and myalgias. She was given PO abx and sent home. Her BP was noted to be high during that visit, 180/72, but improved prior to her leaving. She continued to feel worse with increase chest pain, n/v, and fatigue over the past few days that she associated to the illness. This morning, she woke up with worsening chest pain, SOB, and vomiting so she called 911.      ED course notable for  ST elevation in anterior leads, STEMI alert activated she was given 324 ASA, 180 Brilinta, ntg, morphine and taken to emergently to the cath lab. LHC revealed 100% thrombotic occlusion to the mid-LAD s/p aspiration thrombectomy and PCI with Synergy 2.5x 32 LIAM. Patient was admitted to the ICU post-catheterization. Cardiology is consulted for post STEMI care/recommendations.      Home CV Medications: Amlodipine-Benazepril 10mg-40mg daily, Atorvastatin 20mg daily, HCTZ 12.5mg daily     #CAD c/b STEMI to the LAD- Cath revealed 100% thrombotic occlusion to the mid-LAD s/p aspiration thrombectomy and PCI with Synergy 2.5x 32 LIAM  -ASA/Ticagrelor for 1 year without interruption followed by lifelong ASA  -Continue increased Atorvastatin  80mg daily  -Troponin peaked at >125,000 and downtrended   -Metoprolol held due to bradycardia.   -Continue Lisinopril 40 mg daily   -Echo showed normal  LV systolic function with wall motion abnormalities     #Mild to moderate aortic stenosis   Outpatient surveillance      #HTN- Acceptable Currently on Lisinopril 40 mg daily. Holding home amlodipine    #HLD- , Continue statin as above  #DM- Optimize glycemic control per primary. HbgA1C 7.3  #Covid and RSV positive- management per primary team         RECOMMENDATIONS:  -DAPT with ASA/Ticagrelor x1yr without interruption, then lifelong ASA  -Monitor electrolytes, replete for K <4 and Mg <2  -Follow up with cardiologist Dr. Perez as scheduled 2/3/2025  -Outpatient cardiac rehab  -Okay to discharge from CV standpoint       Code Status:  Full Code    Frantz Nathan, APRN-CNP

## 2025-01-15 NOTE — PROGRESS NOTES
01/15/25 1223   Discharge Planning   Expected Discharge Disposition Home         Patient has active discharge order. She is going home, no needs identified.

## 2025-01-15 NOTE — CARE PLAN
Problem: Pain - Adult  Goal: Verbalizes/displays adequate comfort level or baseline comfort level  Outcome: Progressing     Problem: Safety - Adult  Goal: Free from fall injury  Outcome: Progressing     Problem: Respiratory  Goal: Clear secretions with interventions this shift  Outcome: Progressing  Goal: Minimize anxiety/maximize coping throughout shift  Outcome: Progressing  Goal: Minimal/no exertional discomfort or dyspnea this shift  Outcome: Progressing  Goal: No signs of respiratory distress (eg. Use of accessory muscles. Peds grunting)  Outcome: Progressing   The patient's goals for the shift include      The clinical goals for the shift include monitor vs, labs, I&O's; manage pain; promote safe ambulation with assistance; rest       Right Hand/Right Foot

## 2025-01-15 NOTE — NURSING NOTE
Discharge instructions given to pt and pt sister Carmen. Pt and sister verbalized understanding. Pt states she already received her meds form meds to beds. Will call for transport.

## 2025-01-16 ENCOUNTER — PATIENT OUTREACH (OUTPATIENT)
Dept: PRIMARY CARE | Facility: CLINIC | Age: 70
End: 2025-01-16
Payer: MEDICARE

## 2025-01-16 DIAGNOSIS — I21.02 ST ELEVATION MYOCARDIAL INFARCTION INVOLVING LEFT ANTERIOR DESCENDING (LAD) CORONARY ARTERY (MULTI): Primary | ICD-10-CM

## 2025-01-16 LAB
ATRIAL RATE: 63 BPM
GLUCOSE BLD MANUAL STRIP-MCNC: 105 MG/DL (ref 74–99)
GLUCOSE BLD MANUAL STRIP-MCNC: 148 MG/DL (ref 74–99)
GLUCOSE BLD MANUAL STRIP-MCNC: 91 MG/DL (ref 74–99)
P AXIS: 60 DEGREES
P OFFSET: 199 MS
P ONSET: 135 MS
PR INTERVAL: 164 MS
Q ONSET: 217 MS
QRS COUNT: 10 BEATS
QRS DURATION: 88 MS
QT INTERVAL: 512 MS
QTC CALCULATION(BAZETT): 523 MS
QTC FREDERICIA: 520 MS
R AXIS: -1 DEGREES
T AXIS: 109 DEGREES
T OFFSET: 473 MS
VENTRICULAR RATE: 63 BPM

## 2025-01-16 NOTE — PROGRESS NOTES
Discharge Facility:[facility]  Discharge Diagnosis:ST Elevation MI  Admission Date:1/12/25  Discharge Date: 1/15/25    PCP Appointment Date:1/24//25  Specialist Appointment Date:   Hospital Encounter and Summary Linked: Yes  See discharge assessment below for further details  Engagement  Call Start Time: 0917 (1/16/2025  9:17 AM)    Medications  Medications reviewed with patient/caregiver?: Yes (asa 81mg lisinipril 40mg Brilinta 90mg) (1/16/2025  9:17 AM)  Is the patient having any side effects they believe may be caused by any medication additions or changes?: No (1/16/2025  9:17 AM)  Does the patient have all medications ordered at discharge?: Yes (1/16/2025  9:17 AM)  Is the patient taking all medications as directed (includes completed medication regime)?: Yes (1/16/2025  9:17 AM)    Appointments  Does the patient have a primary care provider?: Yes (1/16/2025  9:17 AM)  Care Management Interventions: Verified appointment date/time/provider (1/16/2025  9:17 AM)    Self Management  What is the home health agency?: N/A (1/16/2025  9:17 AM)  Has home health visited the patient within 72 hours of discharge?: Not applicable (1/16/2025  9:17 AM)  What Durable Medical Equipment (DME) was ordered?: N/A (1/16/2025  9:17 AM)  Has all Durable Medical Equipment (DME) been delivered?: No (1/16/2025  9:17 AM)    Patient Teaching  Does the patient have access to their discharge instructions?: Yes (1/16/2025  9:17 AM)  Care Management Interventions: Reviewed instructions with patient (1/16/2025  9:17 AM)  What is the patient's perception of their health status since discharge?: Improving (1/16/2025  9:17 AM)  Is the patient/caregiver able to teach back the hierarchy of who to call/visit for symptoms/problems? PCP, Specialist, Home Health nurse, Urgent Care, ED, 911: Yes (1/16/2025  9:17 AM)    Wrap Up  Wrap Up Additional Comments: discused discharge patient stated that she is improving just stil feeling a little tired, provided  contact information encouragedcall with questions. (1/16/2025  9:17 AM)  Call End Time: 0923 (1/16/2025  9:17 AM)

## 2025-01-17 ENCOUNTER — TELEMEDICINE (OUTPATIENT)
Dept: PHARMACY | Facility: HOSPITAL | Age: 70
End: 2025-01-17
Payer: MEDICARE

## 2025-01-17 DIAGNOSIS — I21.3 STEMI (ST ELEVATION MYOCARDIAL INFARCTION) (MULTI): ICD-10-CM

## 2025-01-17 DIAGNOSIS — I21.3 ST ELEVATION MYOCARDIAL INFARCTION (STEMI), UNSPECIFIED ARTERY (MULTI): ICD-10-CM

## 2025-01-17 DIAGNOSIS — E11.65 TYPE 2 DIABETES MELLITUS WITH HYPERGLYCEMIA, WITHOUT LONG-TERM CURRENT USE OF INSULIN: ICD-10-CM

## 2025-01-21 ENCOUNTER — DOCUMENTATION (OUTPATIENT)
Dept: CARDIAC REHAB | Facility: CLINIC | Age: 70
End: 2025-01-21

## 2025-01-21 ENCOUNTER — APPOINTMENT (OUTPATIENT)
Dept: PHARMACY | Facility: HOSPITAL | Age: 70
End: 2025-01-21
Payer: MEDICARE

## 2025-01-21 NOTE — PROGRESS NOTES
Marsha Tapia  01/21/25    Called patient to schedule initial assessment appointment for cardiac rehab at [facility]. Spoke with patient and scheduled initial assessment appointment on the phone Patient was provided with CPT code and advised to call the insurance in order to verify coverage and co-pays.  Patient called us and was very confused about her coverage and was upset and argumentative. I called her insurance to confirm and called her back about her coverage and benefits. She has to meet her OOP and then she will be 100% covered. Until she meets her OOP she has a 15$ copay and she is aware.        Cari De Leon RN

## 2025-01-23 ENCOUNTER — CLINICAL SUPPORT (OUTPATIENT)
Dept: CARDIAC REHAB | Facility: CLINIC | Age: 70
End: 2025-01-23
Payer: MEDICARE

## 2025-01-23 VITALS
BODY MASS INDEX: 28.51 KG/M2 | WEIGHT: 167 LBS | HEIGHT: 64 IN | HEART RATE: 88 BPM | SYSTOLIC BLOOD PRESSURE: 159 MMHG | OXYGEN SATURATION: 97 % | DIASTOLIC BLOOD PRESSURE: 85 MMHG | RESPIRATION RATE: 12 BRPM

## 2025-01-23 DIAGNOSIS — I21.02 ST ELEVATION MYOCARDIAL INFARCTION INVOLVING LEFT ANTERIOR DESCENDING (LAD) CORONARY ARTERY (MULTI): ICD-10-CM

## 2025-01-23 DIAGNOSIS — I21.3 STEMI (ST ELEVATION MYOCARDIAL INFARCTION) (MULTI): Primary | ICD-10-CM

## 2025-01-23 ASSESSMENT — DUKE ACTIVITY SCORE INDEX (DASI)
CAN YOU RUN A SHORT DISTANCE: NO
TOTAL_SCORE: 18.95
CAN YOU CLIMB A FLIGHT OF STAIRS OR WALK UP A HILL: YES
CAN YOU WALK INDOORS, SUCH AS AROUND YOUR HOUSE: YES
CAN YOU PARTICIPATE IN MODERATE RECREATIONAL ACTIVITIES LIKE GOLF, BOWLING, DANCING, DOUBLES TENNIS OR THROWING A BASEBALL OR FOOTBALL: NO
CAN YOU DO HEAVY WORK AROUND THE HOUSE LIKE SCRUBBING FLOORS OR LIFTING AND MOVING HEAVY FURNITURE: NO
CAN YOU HAVE SEXUAL RELATIONS: NO
CAN YOU DO LIGHT WORK AROUND THE HOUSE LIKE DUSTING OR WASHING DISHES: YES
CAN YOU WALK A BLOCK OR TWO ON LEVEL GROUND: YES
CAN YOU DO MODERATE WORK AROUND THE HOUSE LIKE VACUUMING, SWEEPING FLOORS OR CARRYING GROCERIES: YES
CAN YOU TAKE CARE OF YOURSELF (EAT, DRESS, BATHE, OR USE TOILET): YES
CAN YOU DO YARD WORK LIKE RAKING LEAVES, WEEDING OR PUSHING A MOWER: NO
DASI METS SCORE: 5.1
CAN YOU PARTICIPATE IN STRENOUS SPORTS LIKE SWIMMING, SINGLES TENNIS, FOOTBALL, BASKETBALL, OR SKIING: NO

## 2025-01-23 ASSESSMENT — PATIENT HEALTH QUESTIONNAIRE - PHQ9
SUM OF ALL RESPONSES TO PHQ9 QUESTIONS 1 & 2: 0
4. FEELING TIRED OR HAVING LITTLE ENERGY: SEVERAL DAYS
6. FEELING BAD ABOUT YOURSELF - OR THAT YOU ARE A FAILURE OR HAVE LET YOURSELF OR YOUR FAMILY DOWN: NOT AT ALL
5. POOR APPETITE OR OVEREATING: NEARLY EVERY DAY
SUM OF ALL RESPONSES TO PHQ QUESTIONS 1-9: 6
3. TROUBLE FALLING OR STAYING ASLEEP OR SLEEPING TOO MUCH: NOT AT ALL
7. TROUBLE CONCENTRATING ON THINGS, SUCH AS READING THE NEWSPAPER OR WATCHING TELEVISION: SEVERAL DAYS
9. THOUGHTS THAT YOU WOULD BE BETTER OFF DEAD, OR OF HURTING YOURSELF: NOT AT ALL
1. LITTLE INTEREST OR PLEASURE IN DOING THINGS: NOT AT ALL
SUM OF ALL RESPONSES TO PHQ QUESTIONS 1-9: 6
8. MOVING OR SPEAKING SO SLOWLY THAT OTHER PEOPLE COULD HAVE NOTICED. OR THE OPPOSITE, BEING SO FIGETY OR RESTLESS THAT YOU HAVE BEEN MOVING AROUND A LOT MORE THAN USUAL: SEVERAL DAYS
2. FEELING DOWN, DEPRESSED OR HOPELESS: NOT AT ALL

## 2025-01-23 NOTE — PROGRESS NOTES
Cardiac Rehabilitation Initial Treatment Plan    Name: Marsha Tapia  Medical Record Number: [id number]  YOB: 1955  Age: 69 y.o.    Today’s Date: 1/23/2025  Primary Care Physician: Stephane Sandhu MD  Referring Physician: Frantz Nathan AP* Francisco Lorrondo MD  Program Location: [address]  Primary Diagnosis:   1. STEMI (ST elevation myocardial infarction) (Multi)  Referral to Cardiac Rehab      2. ST elevation myocardial infarction involving left anterior descending (LAD) coronary artery (Multi)  Referral to Cardiac Rehab         Onset/Date of Diagnosis: 1/12/2025    Initial Assessment, not yet started program.    AACVPR Risk Stratification: High     Falls Risk: High  Psychosocial Assessment     Pre PHQ-9: 6      Sent PH-Q 9 to MD if score > 20: No; score < 20    Pt reported/currently experiencing stress: No  Patient uses stress management skills: No   History of: depression  Currently seeing a mental health provider: No  Social Support: Yes, Whom:family  friend  Quality of Life Survey: SF-36   SF-36 Pre Post   Physical Component Score TBD TBD   Mental Component Score TBd TBD     Learning Assessment:  Learning assessment/barriers: None  Preferred learning method: Auditory and Visual  Barriers: None  Comments:    Stages of Change:Preparation    Psychosocial Plan    Goal Status: Initial Assessment; goals not yet started    Psychosocial Goals: Demonstrating proper techniques for stress management, Maintain or lower PH-Q 9 score by discharge, and Identify strategies for managing depression    Psychosocial Interventions/Education: To be done in Cardiac Rehab.    Initial Assessment: in progress    Nutrition Assessment:    Hyperlipidemia: Yes     Lipids:   Lab Results   Component Value Date    CHOL 160 01/12/2025    HDL 42.3 01/12/2025    LDLF 120 (H) 11/09/2022    TRIG 67 01/12/2025       Current Dietary Guidelines: Low fat, Low sodium  Barriers to dietary change: no    Diet  "Habit Survey: Picture Your Plate  Pre: Initial survey given. Pending completion and return from patient.  Post: To be done at discharge.    Diabetes Assessment    Lab Results   Component Value Date    HGBA1C 7.3 (H) 01/12/2025       History of Diabetes: No    Weight Management    Height: 162.6 cm (5' 4.02\")  Weight: 75.8 kg (167 lb)  BMI (Calculated): 28.65  No data recorded    Nutrition Plan    Goal Status: Initial Assessment; goals not yet started    Nutrition Goals: Lipid Goal: HDL>45, LDL <70, Total <180, Trigs <150, Improve Diet Habit Survey score by 5-10 points by discharge, Adapt a low-sodium, DASH diet prior to discharge, Adapt a Mediterranean focused diet prior to discharge, Learn how to read and interpret nutrition labels prior to discharge, and Lose 1lb/week while enrolled in program    Nutrition Interventions/Education:   To be done in Cardiac Rehab.    Initial Assessment: in progress    Exercise Assessment    No  Mode: NA  Frequency: NA  Duration: NA    Exercise Prescription     Exercise Prescription based on: Duke Activity Status Index (DASI)    DASI Score: 18.95   MET Score: 5.1   Frequency:  3 days/week   Mode: Treadmill, NuStep, and Recumbent Cycle   Duration:  30 total aerobic minutes   Intensity: RPE 12-16  Target HR:  To be calculated after 6 attended sessions.  MET Level: 2.6  Patient wears supplemental O2: No     Modality Workload METs Duration (minutes)   1 Pre-Exercise      2 Treadmill 2.1 mph  2.6 10 :00   3 NuStep 3@48 burt  2.5 10 :00   4 Recumbent Bike 3@50 RPM  2.3 10 :00   5 Cooldown    5 :00   6 Post-Exercise        Resistance Training: No   Home Exercise Prescription given: To be given at session # 6    Exercise Plan    Goal Status: Initial Assessment; goals not yet started    Exercise Goals: Increase exercise MET level by 5-10% each week, Increase total exercise duration to 30-45 minutes, Obtain 150 minutes/week of moderate intensity aerobic exercise, and Establish a home exercise " "program before discharge    Exercise Interventions/Education:   To be done in Cardiac Rehab.    Initial Assessment: in progress    Other Core Components/Risk Factor Assessment:    Medication adherence  Current Medications:   Medication Documentation Review Audit       Reviewed by Cari De Leon RN (Registered Nurse) on 01/23/25 at 1316      Medication Order Taking? Sig Documenting Provider Last Dose Status   aspirin 81 mg EC tablet [id number] Yes Take 1 tablet (81 mg) by mouth once daily. ROEL Garza  Active   atorvastatin (Lipitor) 80 mg tablet [id number] Yes Take 1 tablet (80 mg) by mouth once daily. ROEL Garza  Active   docusate sodium (Colace) 100 mg capsule [id number] Yes Take 1 capsule (100 mg) by mouth twice a day. Historical Provider, MD  Active   fluticasone (Flonase) 50 mcg/actuation nasal spray [id number] Yes Administer 2 sprays into affected nostril(s) once daily. Historical Provider, MD  Active   foam bandage (Allevyn) 2 X 2 \" bandage [id number] Yes Apply to right axilla as directed John Aguila MD  Active   lisinopril 40 mg tablet [id number] Yes Take 1 tablet (40 mg) by mouth once daily. ROEL Garza  Active   metFORMIN (Glucophage) 500 mg tablet [id number] Yes Take 1 tablet (500 mg) by mouth 2 times daily (morning and late afternoon). Stephane Sandhu MD  Active   nitroglycerin (Nitrostat) 0.4 mg SL tablet [id number] Yes Place 1 tablet (0.4 mg) under the tongue every 5 minutes if needed for chest pain. Historical Provider, MD  Active     Discontinued 01/17/25 1459     Discontinued 01/17/25 1506   ticagrelor (Brilinta) 90 mg tablet [id number] Yes Take 1 tablet (90 mg) by mouth 2 times a day. Stephane Sandhu MD  Active                                 Medication compliance: Yes   Uses pill box/organizer: No    Carries medication list: No     Blood Pressure Management  History of Hypertension: Yes   Medication Changes: No   Resting BP:  Visit Vitals  /85 " (BP Location: Left arm, Patient Position: Sitting, BP Cuff Size: Large adult)   Pulse 88   Resp 12        Heart Failure Management  Hx of Heart Failure: No    Smoking/Tobacco Assessment  Social History     Tobacco Use   Smoking Status Never   Smokeless Tobacco Not on file       Other Core Component Plan    Goal Status: Initial Assessment; goals not yet started    Other Core Component Goals: Medication compliance and Achieve resting BP of < 130/80 by discharge    Other Core Component Interventions/Education:   Verbalize medication usage and drug actions by discharge and Verbalize SL NTG action and proper dosage by discharge    Initial Assessment: in progress     Individual Patient Goals:    Increase strength and endurance to help with balance by d/c  Learn healthy eating habits by education by midpoint     Goal Status: Initial Assessment; goals not yet started    Staff Comments:  Attend 3 days a week      Rehab Staff Signature: Cari De Leon RN

## 2025-01-24 ENCOUNTER — APPOINTMENT (OUTPATIENT)
Dept: PRIMARY CARE | Facility: CLINIC | Age: 70
End: 2025-01-24
Payer: MEDICARE

## 2025-01-24 VITALS
DIASTOLIC BLOOD PRESSURE: 70 MMHG | WEIGHT: 171 LBS | BODY MASS INDEX: 29.34 KG/M2 | TEMPERATURE: 98.3 F | HEART RATE: 66 BPM | RESPIRATION RATE: 16 BRPM | SYSTOLIC BLOOD PRESSURE: 130 MMHG

## 2025-01-24 DIAGNOSIS — H34.8392 BRANCH RETINAL VEIN OCCLUSION, UNSPECIFIED COMPLICATION STATUS, UNSPECIFIED LATERALITY (CMS-HCC): ICD-10-CM

## 2025-01-24 DIAGNOSIS — I10 BENIGN ESSENTIAL HTN: ICD-10-CM

## 2025-01-24 DIAGNOSIS — C50.912 MALIGNANT NEOPLASM OF LEFT FEMALE BREAST, UNSPECIFIED ESTROGEN RECEPTOR STATUS, UNSPECIFIED SITE OF BREAST: ICD-10-CM

## 2025-01-24 DIAGNOSIS — E13.69 OTHER SPECIFIED DIABETES MELLITUS WITH OTHER SPECIFIED COMPLICATION, UNSPECIFIED WHETHER LONG TERM INSULIN USE (MULTI): ICD-10-CM

## 2025-01-24 DIAGNOSIS — I25.83 CORONARY ARTERY DISEASE DUE TO LIPID RICH PLAQUE: ICD-10-CM

## 2025-01-24 DIAGNOSIS — I25.10 CORONARY ARTERY DISEASE DUE TO LIPID RICH PLAQUE: ICD-10-CM

## 2025-01-24 DIAGNOSIS — E11.65 TYPE 2 DIABETES MELLITUS WITH HYPERGLYCEMIA, WITHOUT LONG-TERM CURRENT USE OF INSULIN: ICD-10-CM

## 2025-01-24 DIAGNOSIS — J01.90 ACUTE NON-RECURRENT SINUSITIS, UNSPECIFIED LOCATION: Primary | ICD-10-CM

## 2025-01-24 LAB
POC FINGERSTICK BLOOD GLUCOSE: 118 MG/DL (ref 70–100)
POC HEMOGLOBIN A1C: 6.8 % (ref 4.2–6.5)

## 2025-01-24 PROCEDURE — 82962 GLUCOSE BLOOD TEST: CPT | Performed by: INTERNAL MEDICINE

## 2025-01-24 PROCEDURE — 4010F ACE/ARB THERAPY RXD/TAKEN: CPT | Performed by: INTERNAL MEDICINE

## 2025-01-24 PROCEDURE — 1160F RVW MEDS BY RX/DR IN RCRD: CPT | Performed by: INTERNAL MEDICINE

## 2025-01-24 PROCEDURE — 3078F DIAST BP <80 MM HG: CPT | Performed by: INTERNAL MEDICINE

## 2025-01-24 PROCEDURE — 83036 HEMOGLOBIN GLYCOSYLATED A1C: CPT | Performed by: INTERNAL MEDICINE

## 2025-01-24 PROCEDURE — 3049F LDL-C 100-129 MG/DL: CPT | Performed by: INTERNAL MEDICINE

## 2025-01-24 PROCEDURE — 3051F HG A1C>EQUAL 7.0%<8.0%: CPT | Performed by: INTERNAL MEDICINE

## 2025-01-24 PROCEDURE — 99495 TRANSJ CARE MGMT MOD F2F 14D: CPT | Performed by: INTERNAL MEDICINE

## 2025-01-24 PROCEDURE — 1111F DSCHRG MED/CURRENT MED MERGE: CPT | Performed by: INTERNAL MEDICINE

## 2025-01-24 PROCEDURE — 3075F SYST BP GE 130 - 139MM HG: CPT | Performed by: INTERNAL MEDICINE

## 2025-01-24 PROCEDURE — 1159F MED LIST DOCD IN RCRD: CPT | Performed by: INTERNAL MEDICINE

## 2025-01-24 RX ORDER — METFORMIN HYDROCHLORIDE 500 MG/1
500 TABLET ORAL
Qty: 120 TABLET | Refills: 1 | Status: SHIPPED | OUTPATIENT
Start: 2025-01-24

## 2025-01-24 RX ORDER — VALSARTAN 160 MG/1
160 TABLET ORAL DAILY
Qty: 100 TABLET | Refills: 3 | Status: SHIPPED | OUTPATIENT
Start: 2025-01-24 | End: 2026-02-28

## 2025-01-24 RX ORDER — NITROGLYCERIN 0.4 MG/1
0.4 TABLET SUBLINGUAL EVERY 5 MIN PRN
Qty: 90 TABLET | Refills: 1 | Status: SHIPPED | OUTPATIENT
Start: 2025-01-24

## 2025-01-24 RX ORDER — BENZONATATE 100 MG/1
100 CAPSULE ORAL 3 TIMES DAILY PRN
Qty: 42 CAPSULE | Refills: 0 | Status: SHIPPED | OUTPATIENT
Start: 2025-01-24 | End: 2025-02-23

## 2025-01-24 RX ORDER — AMOXICILLIN AND CLAVULANATE POTASSIUM 875; 125 MG/1; MG/1
1 TABLET, FILM COATED ORAL 2 TIMES DAILY
Qty: 14 TABLET | Refills: 0 | Status: SHIPPED | OUTPATIENT
Start: 2025-01-24 | End: 2025-01-31

## 2025-01-24 NOTE — PROGRESS NOTES
"Marsha Tapia is a 69 y.o. female   Patient with a past medical history of breast CA s/p mastectomy, chemo and radiation, DM, HTN, HLD, IgA deficiency, chronic diarrhea, CAD s/p PCI of LAD (1/25), Osteoarthritis, severe cervical central canal stenosis with cord compression and evidence of cord edema at C3-4 and C5-6,s/p C3-C6 laminoplasty on 7/23/21, Hearing loss, Colonoscopy due in 2031, MAmmogram (March)    Admit 1/12/25  DC 1/15/25    Patient with a past medical history significant for acid reflux disease hypertension dyslipidemia diabetes mellitus history of breast carcinoma presented to the hospital with increasing fatigue shortness of breath and chest pain  She was previously treated at an urgent care center for upper respite tract infections  Workup in the emergency room showed elevated troponins and concerns for STEMI as evidenced by ST elevations in anterior leads  Was stabilized in the ER and taken to the Cath Lab  Cath Lab left heart cath showed 100% mid LAD thrombosis with total occlusion this was addressed with aspiration thrombectomy and a stent  Was transferred out to the ICU in stable condition and then transferred to the regular floor  Patient also tested positive for both RSV and COVID  However denies any shortness of breath or rhinorrhea  Patient has remained chest pain-free      CT shows \"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 There is apparent stenosis of the right subclavian artery and vein, likely due to compression between the clavicle and rib\"      Dr. Dunphy reviewed imaging; said with these cases the most concerning thing is to evaluate for aneurysm, which is not seen on her imaging. She recommends follow up with herself in 3 months as an outpatient.  She will need periodic serial CT scans.      Still has a cough  Mostly dry  Also has sinus congestion and drainage though           Review of Systems     Constitutional: no fever, no chills, not " "feeling poorly, not feeling tired and no recent weight gain, no recent weight loss.   ENT: no earache, no hearing loss, no nosebleeds, no nasal discharge, no sore throat and no hoarseness.   Cardiovascular: the heart rate was not slow, the heart rate was not fast, no chest pain, no palpitations, no intermittent leg claudication and no lower extremity edema.   Respiratory: Cough  Gastrointestinal: no abdominal pain, no constipation, no melena, no nausea, no diarrhea, no vomiting and no blood in stools.   Musculoskeletal: no arthralgias, no myalgias, no back pain, no joint swelling, no joint stiffness, no limb pain and no limb swelling.   Integumentary: no skin rashes, no skin lesions, no itching, no skin wound and no dry skin.   Neurological: no headache, no confusion, no numbness, no dizziness, no tingling and no fainting.   All other systems have been reviewed and are negative for complaint.     Current Outpatient Medications   Medication Instructions    aspirin 81 mg, oral, Daily    atorvastatin (LIPITOR) 80 mg, oral, Daily    docusate sodium (COLACE) 100 mg, 2 times daily    fluticasone (Flonase) 50 mcg/actuation nasal spray 2 sprays, Daily    foam bandage (Allevyn) 2 X 2 \" bandage Apply to right axilla as directed    metFORMIN (GLUCOPHAGE) 500 mg, oral, 2 times daily (morning and late afternoon)    nitroglycerin (NITROSTAT) 0.4 mg, Every 5 min PRN    ticagrelor (BRILINTA) 90 mg, oral, 2 times daily         Vitals:    01/24/25 0819   BP: 130/70   Pulse: 66   Resp: 16   Temp: 36.8 °C (98.3 °F)        Physical Exam     Constitutional   General appearance: Alert and in no acute distress.     Pulmonary   Respiratory assessment: No respiratory distress, normal respiratory rhythm and effort.    Auscultation of Lungs: Clear bilateral breath sounds.   Cardiovascular   Auscultation of heart: Apical pulse normal, heart rate and rhythm normal, normal S1 and S2, no murmurs and no pericardial rub.    Exam for edema: No " peripheral edema.   Abdomen   Abdominal Exam: No bruits, normal bowel sounds, soft, non-tender, no abdominal mass palpated.    Liver and Spleen exam: No hepato-splenomegaly.   Musculoskeletal   Examination of gait: Normal.    Inspection of digits and nails: No clubbing or cyanosis of the fingernails.    Inspection/palpation of joints, bones and muscles: No joint swelling. Normal movement of all extremities.   Skin   Skin inspection: Normal skin color and pigmentation, normal skin turgor and no visible rash.   Neurologic   Cranial nerves: Nerves 2-12 were intact, no focal neuro defects.    Assessment/Plan          Patient with a past medical history of breast CA s/p mastectomy, chemo and radiation, DM, HTN, HLD, IgA deficiency, chronic diarrhea, CAD s/p PCI of LAD (1/25), Osteoarthritis, severe cervical central canal stenosis with cord compression and evidence of cord edema at C3-4 and C5-6,s/p C3-C6 laminoplasty on 7/23/21, Hearing loss, Colonoscopy due in 2031, MAmmogram (March)     # Persistent dry cough with sinus congestion  We will call in Augmentin for 7 days along with Tessalon Perles  Stop the lisinopril and switch to valsartan  CAT scan from the hospital visit reviewed  There was some mediastinal lymphadenopathy  I would like to repeat the CAT scan in 3 months    #Mammogram needed  Was supposed to be done yesterday and she forgot  Counseled the patient that she needs to get the mammogram done based on the CAT scan findings     #Coronary artery disease s/p PCI  Will be starting cardiac rehab  Continue Brilinta for at least 1 year  Continue statins lifelong and aspirin lifelong    #Hypertension  Stable  Will switch to an ARB because I suspect the ACE is contributing to the patient's cough     #Diabetes mellitus  Will restart metformin daily     #Dyslipidemia with elevated calcium scores  Target LDL less than 70

## 2025-01-24 NOTE — PROGRESS NOTES
"Marsha Tapia is a 69 y.o. female here for a Medicare Wellness Exam.    Chief Complaint   Patient presents with   • Follow-up        HPI     Medicare Wellness Exam    The patent is being seen for {INITIAL/FOLLOW UP:41278::\"an initial\"} annual wellness visit  Past Medical, Surgical and family History: Reviewed and updated in chart  Interval History: Patient {HAS HAS NOT:49140::\"has not\"} been hospitalized previously  Medications and Supplements: Review of all medications by a prescribing practitioner or clinical pharmacist (such as prescriptions, OTC, Herbal therapies and supplements) documented in the medical record.    Patient Self-Assessment of health: {GOOD/FAIR/POOR (Optional):37299::\"Excellent\"}  Tobacco Use: {YES/NO:97680::\"No\"}  Alcohol Use: {YES/NO:51422::\"No\"}  Illicit drug use: {YES/NO:47166::\"No\"}  Patient using opioids: {YES/NO:99710::\"No\"}    Current Diet: {Desc; diets:37733}  Adequate fluid intake: {YES/NO:61163::\"Yes\"}  Caffeine intake: {YES/NO:17755::\"Yes\"}  Exercise frequency: {exercise level:74826::\"moderately active\"}    Depression/Suicide screening: PHQ2/ PHQ9 (see screenings tab)    Hearing impairment: {YES/NO:72091::\"No\"}  Uses hearing aids {RIGHT/LEFT:15321}  Cognitive impairment Observation: {YES/NO:74033::\"No\"}   Patient or family reported cognitive impairment: {YES/NO:53408::\"No\"}    Bathing: {INDEPENDENT:17441::\"independent\"}  Dressing: {INDEPENDENT:31889::\"independent\"}  Walking: {INDEPENDENT:80069::\"independent\"}  Taking Medications: {INDEPENDENT:53639::\"independent\"}  Feeding: {INDEPENDENT:54703::\"independent\"}  Personal Hygiene: {INDEPENDENT:56348::\"independent\"}  Managing Finances: {INDEPENDENT:35331::\"independent\"}  Shopping: {INDEPENDENT:27559::\"independent\"}  Housework/Basic Home Maintenance: {INDEPENDENT:50288::\"independent\"}  Handling transportation: {INDEPENDENT:68382::\"independent\"}  Preparing meals: {INDEPENDENT:74618::\"independent\"}    Bowels: " "{continence:19529::\"continent\"}  Bladder: {continence:19529::\"continent\"}    Falls Risk: { HAS HAS NOT:18834::\"has not\"}fallen in last 6 months.   Their fall { HAS HAS NOT:18834::\"has not\"} resulted in an injury.   Fall risk Factors: {Fall Risk Factors:05141::\"***\"} {None/mild/mod/significant:76314::\"none\"}   Care Plan Risk: {Care Plan:13934}    Home Safety Risk Factors: {Home Safety Risk Factors:73455}  Advanced Directives:  Living will: {YES/NO:35611::\"Yes\"} POA: {YES/NO:99214::\"Yes\"}    Patient's End of Life Decisions: Provider {ATTESTATION AGREE:454861::\"agree\"} to follow.      Past Medical History:   Diagnosis Date   • Breast cancer (Multi)    • Hx antineoplastic chemo    • Localized enlarged lymph nodes 06/22/2021    Axillary adenopathy   • Personal history of irradiation    • Personal history of malignant neoplasm of breast     History of malignant neoplasm of left breast        Current Outpatient Medications   Medication Instructions   • aspirin 81 mg, oral, Daily   • atorvastatin (LIPITOR) 80 mg, oral, Daily   • docusate sodium (COLACE) 100 mg, 2 times daily   • fluticasone (Flonase) 50 mcg/actuation nasal spray 2 sprays, Daily   • foam bandage (Allevyn) 2 X 2 \" bandage Apply to right axilla as directed   • lisinopril 40 mg, oral, Daily   • metFORMIN (GLUCOPHAGE) 500 mg, oral, 2 times daily (morning and late afternoon)   • nitroglycerin (NITROSTAT) 0.4 mg, Every 5 min PRN   • ticagrelor (BRILINTA) 90 mg, oral, 2 times daily       Review of Systems     Constitutional: no fever, no chills, not feeling poorly, not feeling tired and no recent weight gain, no recent weight loss.   ENT: no earache, no hearing loss, no nosebleeds, no nasal discharge, no sore throat and no hoarseness.   Cardiovascular: the heart rate was not slow, the heart rate was not fast, no chest pain, no palpitations, no intermittent leg claudication and no lower extremity edema.   Respiratory: no cough, wheezing or shortness of breath at " "rest or exertion  Gastrointestinal: no abdominal pain, no constipation, no melena, no nausea, no diarrhea, no vomiting and no blood in stools.   Musculoskeletal: no arthralgias, no myalgias, no back pain, no joint swelling, no joint stiffness, no limb pain and no limb swelling.   Integumentary: no skin rashes, no skin lesions, no itching, no skin wound and no dry skin.   Neurological: no headache, no confusion, no numbness, no dizziness, no tingling and no fainting.   All other systems have been reviewed and are negative for complaint.        Physical Exam    Constitutional   General appearance: Alert and in no acute distress.     Pulmonary   Respiratory assessment: No respiratory distress, normal respiratory rhythm and effort.    Auscultation of Lungs: Clear bilateral breath sounds.   Cardiovascular   Auscultation of heart: Apical pulse normal, heart rate and rhythm normal, normal S1 and S2, no murmurs and no pericardial rub.    Exam for edema: No peripheral edema.   Abdomen   Abdominal Exam: No bruits, normal bowel sounds, soft, non-tender, no abdominal mass palpated.    Liver and Spleen exam: No hepato-splenomegaly.   Musculoskeletal   Examination of gait: Normal.    Inspection of digits and nails: No clubbing or cyanosis of the fingernails.    Inspection/palpation of joints, bones and muscles: No joint swelling. Normal movement of all extremities.   Skin   Skin inspection: Normal skin color and pigmentation, normal skin turgor and no visible rash.   Neurologic   Cranial nerves: Nerves 2-12 were intact, no focal neuro defects.       Assessment/Plan      {Assess/Plan SmartLinks (Optional):73367::\"***\"}      Advance Directives Discussion  less than 30 minutes spent discussing Advanced Care Planning (including a Living Will, Healthcare POA, as well as specific end of life choices and/or directives). The details of that discussion were documented in Advanced Directives Discussion section of the medical record.       "   Depression Screening  __ minutes  were spent screening for depression using PHQ2/PHQ9 as documented in the chart.     Alcohol Screening  __ minutes were spent screening for alcohol use/misuse as documented in the chart.     Alcohol Use Counseling  __ minutes were spent counseling the patient and offering support/resources on alcohol use disorder.     Obesity Counseling  __ minutes were spent counseling on diet and exercise interventions to address obesity and weight reduction.         Tobacco Counseling  __ minutes were spent counseling the patient on tobacco cessation.  Benefits of cessation were discussed as well as techniques to help quit.      Cardiac Risk Assessment  __minutes were spent assessing and discussing cardiovascular risk was and, if needed, lifestyle modifications recommended, including nutritional choices, exercise, and elimination of habits contributing to risk. Aspirin use/disuse was discussed following the guidelines below.     Low dose ASA ( mg) should be considered:  If you have prior Heart Attack/Stroke/Peripheral vascular disease:  Generally recommend daily low dose aspirin unless extremely high bleeding risk (e.g., gastrointestinal).     If you do not have prior Heart Attack/Stroke/Peripheral vascular disease:    Age < 70 and your 10-year cardiovascular disease risk is >20%, use low dose Aspirin   Age >=70: Do not use Aspirin for prevention  Low Dose CT Screening  We discussed the pros and cons of low dose CT screening for lung cancer based on the patient's smoking history.  This screening is recommended for patients who:  Are between the age of 50 to 77  Have a 20 pack-year smoking history (20 years of smoking a pack a day)  Are either a current smoker or have quit smoking within the last 15 years  Are in good health - no new cough or unexplained weight loss  Are willing to do the follow-up testing and treatment, if needed  Have not had a chest CT (CAT) scan in the last year

## 2025-01-26 LAB
ATRIAL RATE: 50 BPM
P AXIS: -17 DEGREES
P OFFSET: 194 MS
P ONSET: 126 MS
PR INTERVAL: 180 MS
Q ONSET: 216 MS
QRS COUNT: 8 BEATS
QRS DURATION: 74 MS
QT INTERVAL: 498 MS
QTC CALCULATION(BAZETT): 454 MS
QTC FREDERICIA: 468 MS
R AXIS: -6 DEGREES
T AXIS: -17 DEGREES
T OFFSET: 465 MS
VENTRICULAR RATE: 50 BPM

## 2025-01-27 ENCOUNTER — DOCUMENTATION (OUTPATIENT)
Dept: PHARMACY | Facility: HOSPITAL | Age: 70
End: 2025-01-27

## 2025-01-27 ENCOUNTER — CLINICAL SUPPORT (OUTPATIENT)
Dept: CARDIAC REHAB | Facility: CLINIC | Age: 70
End: 2025-01-27
Payer: MEDICARE

## 2025-01-27 ENCOUNTER — PATIENT OUTREACH (OUTPATIENT)
Dept: PRIMARY CARE | Facility: CLINIC | Age: 70
End: 2025-01-27

## 2025-01-27 DIAGNOSIS — I21.3 STEMI (ST ELEVATION MYOCARDIAL INFARCTION) (MULTI): ICD-10-CM

## 2025-01-27 DIAGNOSIS — I21.02 ST ELEVATION MYOCARDIAL INFARCTION INVOLVING LEFT ANTERIOR DESCENDING (LAD) CORONARY ARTERY (MULTI): Primary | ICD-10-CM

## 2025-01-27 PROCEDURE — RXMED WILLOW AMBULATORY MEDICATION CHARGE

## 2025-01-27 PROCEDURE — 93798 PHYS/QHP OP CAR RHAB W/ECG: CPT | Performed by: INTERNAL MEDICINE

## 2025-01-27 ASSESSMENT — PATIENT HEALTH QUESTIONNAIRE - PHQ9
8. MOVING OR SPEAKING SO SLOWLY THAT OTHER PEOPLE COULD HAVE NOTICED. OR THE OPPOSITE, BEING SO FIGETY OR RESTLESS THAT YOU HAVE BEEN MOVING AROUND A LOT MORE THAN USUAL: NOT AT ALL
5. POOR APPETITE OR OVEREATING: NEARLY EVERY DAY
6. FEELING BAD ABOUT YOURSELF - OR THAT YOU ARE A FAILURE OR HAVE LET YOURSELF OR YOUR FAMILY DOWN: NOT AT ALL
1. LITTLE INTEREST OR PLEASURE IN DOING THINGS: SEVERAL DAYS
4. FEELING TIRED OR HAVING LITTLE ENERGY: MORE THAN HALF THE DAYS
SUM OF ALL RESPONSES TO PHQ QUESTIONS 1-9: 10
SUM OF ALL RESPONSES TO PHQ QUESTIONS 1-9: 10
SUM OF ALL RESPONSES TO PHQ9 QUESTIONS 1 & 2: 2
2. FEELING DOWN, DEPRESSED OR HOPELESS: SEVERAL DAYS
3. TROUBLE FALLING OR STAYING ASLEEP OR SLEEPING TOO MUCH: NEARLY EVERY DAY
7. TROUBLE CONCENTRATING ON THINGS, SUCH AS READING THE NEWSPAPER OR WATCHING TELEVISION: NOT AT ALL
9. THOUGHTS THAT YOU WOULD BE BETTER OFF DEAD, OR OF HURTING YOURSELF: NOT AT ALL

## 2025-01-27 ASSESSMENT — DUKE ACTIVITY SCORE INDEX (DASI)
CAN YOU WALK INDOORS, SUCH AS AROUND YOUR HOUSE: YES
TOTAL_SCORE: 10.7
CAN YOU DO YARD WORK LIKE RAKING LEAVES, WEEDING OR PUSHING A MOWER: NO
CAN YOU TAKE CARE OF YOURSELF (EAT, DRESS, BATHE, OR USE TOILET): YES
DASI METS SCORE: 4.1
CAN YOU WALK A BLOCK OR TWO ON LEVEL GROUND: NO
CAN YOU DO LIGHT WORK AROUND THE HOUSE LIKE DUSTING OR WASHING DISHES: YES
CAN YOU RUN A SHORT DISTANCE: NO
CAN YOU PARTICIPATE IN MODERATE RECREATIONAL ACTIVITIES LIKE GOLF, BOWLING, DANCING, DOUBLES TENNIS OR THROWING A BASEBALL OR FOOTBALL: NO
CAN YOU HAVE SEXUAL RELATIONS: NO
CAN YOU DO MODERATE WORK AROUND THE HOUSE LIKE VACUUMING, SWEEPING FLOORS OR CARRYING GROCERIES: YES
CAN YOU DO HEAVY WORK AROUND THE HOUSE LIKE SCRUBBING FLOORS OR LIFTING AND MOVING HEAVY FURNITURE: NO
CAN YOU CLIMB A FLIGHT OF STAIRS OR WALK UP A HILL: NO
CAN YOU PARTICIPATE IN STRENOUS SPORTS LIKE SWIMMING, SINGLES TENNIS, FOOTBALL, BASKETBALL, OR SKIING: NO

## 2025-01-27 NOTE — PROGRESS NOTES
Patient Assistance Program (PAP):     We are pleased to inform you that your application for assistance has been approved.     This approval is valid through January 27, 2026  as long as the following criteria continue to be satisfied:    Your medication(s) (Brilinta) remains covered under your current insurance plan.  Your prescriber does not discontinue therapy.  You do not seek reimbursement from any other private or government-funded programs for the medication.    Under this program, the pharmacy will first bill your insurance plan for your indemnified specified medication. The [employer] ([employer]) will then offset your copay balance, so that your out-of pocket expense for your specialty medication will be $0.00.    Omer Puri, MaryD

## 2025-01-29 ENCOUNTER — CLINICAL SUPPORT (OUTPATIENT)
Dept: CARDIAC REHAB | Facility: CLINIC | Age: 70
End: 2025-01-29
Payer: MEDICARE

## 2025-01-29 DIAGNOSIS — I21.3 STEMI (ST ELEVATION MYOCARDIAL INFARCTION) (MULTI): ICD-10-CM

## 2025-01-29 DIAGNOSIS — I21.02 ST ELEVATION MYOCARDIAL INFARCTION INVOLVING LEFT ANTERIOR DESCENDING (LAD) CORONARY ARTERY (MULTI): Primary | ICD-10-CM

## 2025-01-29 PROCEDURE — 93798 PHYS/QHP OP CAR RHAB W/ECG: CPT | Performed by: INTERNAL MEDICINE

## 2025-01-29 NOTE — PROGRESS NOTES
Cardiac Rehab Education  Marsha Tapia   [id number]    1/29/2025  Risk factors of cardiovascular disease were discussed today's. Education on modifiable and non-modifiable risk factors of CVD and recommendations to manage them was done today. Handouts were provided along with further reference. Patient was educated to return with further questions.     Signature Kandy Gill MS, CCRP

## 2025-01-30 ENCOUNTER — PHARMACY VISIT (OUTPATIENT)
Dept: PHARMACY | Facility: CLINIC | Age: 70
End: 2025-01-30
Payer: COMMERCIAL

## 2025-01-31 ENCOUNTER — CLINICAL SUPPORT (OUTPATIENT)
Dept: CARDIAC REHAB | Facility: CLINIC | Age: 70
End: 2025-01-31
Payer: MEDICARE

## 2025-01-31 DIAGNOSIS — I21.02 ST ELEVATION MYOCARDIAL INFARCTION INVOLVING LEFT ANTERIOR DESCENDING (LAD) CORONARY ARTERY (MULTI): Primary | ICD-10-CM

## 2025-01-31 DIAGNOSIS — I21.3 STEMI (ST ELEVATION MYOCARDIAL INFARCTION) (MULTI): ICD-10-CM

## 2025-01-31 PROCEDURE — 93798 PHYS/QHP OP CAR RHAB W/ECG: CPT | Performed by: INTERNAL MEDICINE

## 2025-01-31 NOTE — PROGRESS NOTES
Cardiac  Rehab Questionnaires  Marsha Tapia   [id number]     1/31/2025   Patient returned pre rehab forms/questionnaires. Surveys were scored and discussed with patient.     Picture Your Plate Cardiac Pulmonary Picture Your Plate Assessment  Vegetables & Fruits (Score Range: 0-12): 6  Breads, Grains & Cereals (Score Range: 0-12): 7  Red & Processed Meats (Score Range: 0-12): 8  Poultry (Score Range: 0-2): 0  Fish & Shellfish (Score Range: 0-4): 1  Beans, Nuts & Seeds (Score Range: 0-4): 0  Milk & Dairy (Score Range: 0-6): 4  Toppings, Oils, Seasonings & Salt (Score Range: 0-20): 9  Sweets, Snacks & Restaurant Food (Score Range: 0-14): 4  Beverages (Score Range: 0-10): 4  Picture Your Plate Total Score (Score Range: 0-96): 43    Signature: BACILIO Zabala-EP

## 2025-02-03 ENCOUNTER — OFFICE VISIT (OUTPATIENT)
Dept: CARDIOLOGY | Facility: HOSPITAL | Age: 70
End: 2025-02-03
Payer: MEDICARE

## 2025-02-03 ENCOUNTER — CLINICAL SUPPORT (OUTPATIENT)
Dept: CARDIAC REHAB | Facility: CLINIC | Age: 70
End: 2025-02-03
Payer: MEDICARE

## 2025-02-03 VITALS
RESPIRATION RATE: 18 BRPM | OXYGEN SATURATION: 98 % | SYSTOLIC BLOOD PRESSURE: 136 MMHG | WEIGHT: 165 LBS | HEIGHT: 64 IN | DIASTOLIC BLOOD PRESSURE: 71 MMHG | BODY MASS INDEX: 28.17 KG/M2 | HEART RATE: 80 BPM

## 2025-02-03 DIAGNOSIS — Z95.820 S/P ANGIOPLASTY WITH STENT: ICD-10-CM

## 2025-02-03 DIAGNOSIS — I35.0 AORTIC VALVE STENOSIS, ETIOLOGY OF CARDIAC VALVE DISEASE UNSPECIFIED: ICD-10-CM

## 2025-02-03 DIAGNOSIS — E11.9 TYPE 2 DIABETES MELLITUS WITHOUT COMPLICATION, WITHOUT LONG-TERM CURRENT USE OF INSULIN (MULTI): ICD-10-CM

## 2025-02-03 DIAGNOSIS — I21.3 STEMI (ST ELEVATION MYOCARDIAL INFARCTION) (MULTI): ICD-10-CM

## 2025-02-03 DIAGNOSIS — I21.3 ST ELEVATION MYOCARDIAL INFARCTION (STEMI), UNSPECIFIED ARTERY (MULTI): Primary | ICD-10-CM

## 2025-02-03 DIAGNOSIS — I21.02 ST ELEVATION MYOCARDIAL INFARCTION INVOLVING LEFT ANTERIOR DESCENDING (LAD) CORONARY ARTERY (MULTI): ICD-10-CM

## 2025-02-03 PROCEDURE — 1111F DSCHRG MED/CURRENT MED MERGE: CPT

## 2025-02-03 PROCEDURE — 3075F SYST BP GE 130 - 139MM HG: CPT

## 2025-02-03 PROCEDURE — 1160F RVW MEDS BY RX/DR IN RCRD: CPT

## 2025-02-03 PROCEDURE — 4010F ACE/ARB THERAPY RXD/TAKEN: CPT

## 2025-02-03 PROCEDURE — 3049F LDL-C 100-129 MG/DL: CPT

## 2025-02-03 PROCEDURE — 3078F DIAST BP <80 MM HG: CPT

## 2025-02-03 PROCEDURE — G2211 COMPLEX E/M VISIT ADD ON: HCPCS

## 2025-02-03 PROCEDURE — 3051F HG A1C>EQUAL 7.0%<8.0%: CPT

## 2025-02-03 PROCEDURE — 99215 OFFICE O/P EST HI 40 MIN: CPT

## 2025-02-03 PROCEDURE — 93798 PHYS/QHP OP CAR RHAB W/ECG: CPT | Performed by: INTERNAL MEDICINE

## 2025-02-03 PROCEDURE — 1126F AMNT PAIN NOTED NONE PRSNT: CPT

## 2025-02-03 PROCEDURE — 3008F BODY MASS INDEX DOCD: CPT

## 2025-02-03 PROCEDURE — 1159F MED LIST DOCD IN RCRD: CPT

## 2025-02-03 ASSESSMENT — ENCOUNTER SYMPTOMS
LOSS OF SENSATION IN FEET: 0
DEPRESSION: 0
OCCASIONAL FEELINGS OF UNSTEADINESS: 0

## 2025-02-03 ASSESSMENT — PATIENT HEALTH QUESTIONNAIRE - PHQ9
SUM OF ALL RESPONSES TO PHQ9 QUESTIONS 1 AND 2: 0
2. FEELING DOWN, DEPRESSED OR HOPELESS: NOT AT ALL
1. LITTLE INTEREST OR PLEASURE IN DOING THINGS: NOT AT ALL

## 2025-02-03 ASSESSMENT — PAIN SCALES - GENERAL: PAINLEVEL_OUTOF10: 0-NO PAIN

## 2025-02-03 NOTE — PATIENT INSTRUCTIONS
Today, we reviewed your current health status and progress after your heart event. You’re doing well, and I’m happy with how you’ve been sticking to your treatment plan. Here’s a summary of what we discussed and what you need to focus on:    Key Points Reviewed:  Preventing Future Heart Problems: Continue taking Aspirin 81 mg and Brilinta 90 mg twice daily for one year without missing doses. After that, you’ll stay on lifelong Aspirin.    Managing Cholesterol: Keep taking Atorvastatin 80 mg daily to keep your cholesterol in check. We’ll recheck your cholesterol levels with a blood test in 12 weeks.    Blood Pressure Control: Continue Valsartan 160 mg daily to maintain good blood pressure. We’ll keep a close eye on it since you’ve had elevated readings before.    Diabetes Management: Work with your primary care doctor to keep your blood sugar levels stable. We discussed aiming for an HbA1c of <7%, but this can be adjusted based on how you feel.    Cardiac Rehabilitation: Continue your rehab sessions and gradually increase the intensity as long as you feel comfortable. This is key to your recovery and overall heart health.    Aortic Valve Monitoring: Since you have mild to moderate aortic stenosis, we’ll monitor this with an echocardiogram every 12 months to check for any changes.    Follow-Up Plan:  -Come back to the clinic in 3 months or sooner if you experience any symptoms like chest pain, shortness of breath, or swelling.  -Get a lipid panel done before your next visit so we can check your cholesterol progress.  -Echo follow-up in 6 months to assess your heart function and valve health.    You’re on track, and your progress is encouraging. Stay consistent with your medications and rehab, and don’t hesitate to call if you notice any new symptoms. You’ve been doing a great job, and I’m confident you’ll continue moving in the right direction!    See you in 3 months!

## 2025-02-03 NOTE — PROGRESS NOTES
Location of visit: [facility]   Type of Visit: Established - Last Seen: Seen in [facility] during hospitalization    Chief Complaint:  Patient was referred to Cardiology by Dr. Garsia ref. provider found for Coronary Artery Disease (Est Pt follow up ).    History Of Present Illness:    Marsha Tapia is a 69 y.o. female, with history significant for HTN, HLD, asymptomatic sinus bradycardia, T2DM, breast CA s/p mastectomy/chemo/radiation, IgA deficiency, aortic stenosis, OA, GERD, and hx of gastric bypass and STEMI  who visits Cardiology today as a follow up visit  for STEMI.     69 y.o. female, with a PMH of HTN, HLD, asymptomatic sinus bradycardia, T2DM, breast CA s/p mastectomy/chemo/radiation, IgA deficiency, aortic stenosis, OA, GERD, and hx of gastric bypass, who presented to [facility] on 1/12/2025 for chest pain. Patient reports she was initially seen in urgent care on 1/9 for URI symptoms- nasal congestion, cough, rhinorrhea, headache, and myalgias. She was given PO abx and sent home. Her BP was noted to be high during that visit, 180/72, but improved prior to her leaving. She continued to feel worse with increase chest pain, n/v, and fatigue over the past few days that she associated to the illness. This morning, she woke up with worsening chest pain, SOB, and vomiting so she called 911.   ED course notable for  ST elevation in anterior leads, STEMI alert activated she was given 324 ASA, 180 Brilinta, ntg, morphine and taken to emergently to the cath lab.   LHC revealed 100% thrombotic occlusion to the mid-LAD s/p aspiration thrombectomy and PCI with Synergy 2.5x 32 LIAM. Patient was admitted to the ICU post-catheterization. Cardiology was consulted for post STEMI care/recommendations.     Echocardiogram showed: Left ventricular ejection fraction is normal, calculated by Dorado's biplane at 60%. Abnormal left venticular wall motion. Entire septum is abnormal.  Spectral Doppler shows an abnormal pattern  of left ventricular diastolic filling with an elevated left atrial pressure. There is normal right ventricular global systolic function. The left atrium is enlarged. Mild to moderate aortic valve stenosis.    The plan was> -ASA/Ticagrelor for 1 year without interruption followed by lifelong ASA -Continue increased Atorvastatin  80mg daily -Troponin peaked at >125,000 and downtrended  -Metoprolol held due to bradycardia.  -Continue Lisinopril 40 mg daily  -Echo showed normal LV systolic function with wall motion abnormalities   Currently on Lisinopril 40 mg daily. Holding home amlodipine  DM- Optimize glycemic control per primary. HbgA1C 7.3  ======================================    The patient states she has been feeling good, doing her cardio rehab with no problem, using her medication with no complication, and doing her activities with no issues.  She states she has been feeling good in relation to the kind and amount of that she can do.  She denies chest pain, dyspnea on exertion, shortness of breath, orthopnea, PND, nocturia, edema, palpitations, dizziness, lightheadedness or syncope    Blood pressure today: 136/71 mmHg    Past Medical History:  She has a past medical history of Breast cancer (Multi), antineoplastic chemo, Localized enlarged lymph nodes (06/22/2021), Personal history of irradiation, and Personal history of malignant neoplasm of breast.    Past Surgical History:  She has a past surgical history that includes Other surgical history (10/12/2020); Other surgical history (10/12/2020); Other surgical history (10/12/2020); Other surgical history (10/12/2020); Other surgical history (10/28/2022); Other surgical history (09/30/2021); MR angio head wo IV contrast (07/20/2021); MR angio neck wo IV contrast (07/20/2021); Breast lumpectomy; Cardiac catheterization (N/A, 1/12/2025); and Cardiac catheterization (N/A, 1/12/2025).    Social History:  She reports that she has never smoked. She does not have any  "smokeless tobacco history on file. She reports that she does not drink alcohol and does not use drugs.    Family History:  No family history on file.  Allergies:  Ace inhibitors    Outpatient Medications:  Current Outpatient Medications   Medication Instructions    aspirin 81 mg, oral, Daily    atorvastatin (LIPITOR) 80 mg, oral, Daily    benzonatate (TESSALON) 100 mg, oral, 3 times daily PRN, Do not crush or chew.    Brilinta 90 mg, oral, 2 times daily    docusate sodium (COLACE) 100 mg, 2 times daily    fluticasone (Flonase) 50 mcg/actuation nasal spray 2 sprays, Daily    foam bandage (Allevyn) 2 X 2 \" bandage Apply to right axilla as directed    metFORMIN (GLUCOPHAGE) 500 mg, oral, 2 times daily (morning and late afternoon)    nitroglycerin (NITROSTAT) 0.4 mg, sublingual, Every 5 min PRN    valsartan (DIOVAN) 160 mg, oral, Daily     Last Recorded Vitals:  There were no vitals filed for this visit.    Physical Exam:      1/24/2025     8:19 AM 1/23/2025     1:12 PM 1/15/2025    12:42 PM 1/15/2025    11:00 AM 1/15/2025     8:50 AM 1/15/2025     5:00 AM   Vitals   Systolic 130 159 159  133    Diastolic 70 85 85  45    BP Location  Left arm Right arm  Right arm    Heart Rate 66 88 88 56 50 51   Temp 36.8 °C (98.3 °F)  36.8 °C (98.2 °F)  35.7 °C (96.2 °F)    Resp 16 12 18 22 16 13   Height  1.626 m (5' 4.02\")       Weight (lb) 171 167       BMI 29.34 kg/m2 28.65 kg/m2       BSA (m2) 1.87 m2 1.85 m2       Visit Report Report          Wt Readings from Last 5 Encounters:   01/24/25 77.6 kg (171 lb)   01/23/25 75.8 kg (167 lb)   01/14/25 75.8 kg (167 lb 1.7 oz)   10/29/24 77.4 kg (170 lb 9.6 oz)   10/25/24 76 kg (167 lb 9.6 oz)       Physical Exam  Vitals reviewed.   HENT:      Head: Normocephalic.      Mouth/Throat:      Pharynx: Oropharynx is clear.   Eyes:      Pupils: Pupils are equal, round, and reactive to light.   Cardiovascular:      Rate and Rhythm: Normal rate.      Pulses: Normal pulses.      Heart sounds: " Normal heart sounds.   Pulmonary:      Effort: Pulmonary effort is normal.      Breath sounds: Normal breath sounds.   Abdominal:      General: Abdomen is flat. Bowel sounds are normal.      Palpations: Abdomen is soft.   Musculoskeletal:         General: Normal range of motion.   Skin:     General: Skin is warm and dry.   Neurological:      General: No focal deficit present.      Mental Status: She is alert.   Psychiatric:         Mood and Affect: Mood normal.              Last Labs Reviewed:  CBC -  Recent Labs     01/15/25  0638 01/14/25  0606 01/13/25  0858 01/12/25  0512 07/09/24  0013   WBC 7.5 7.9 9.0 7.1 8.5   HGB 9.3* 8.2* 9.1* 9.7* 8.8*   HCT 29.9* 27.1* 29.6* 33.2* 28.5*    203 214 231 275   MCV 77* 78* 78* 80 79*     CMP -  Recent Labs     01/15/25  0638 01/14/25  0606 01/13/25  0539 01/12/25  0512 07/09/24  0013 07/24/21  0743 07/23/21  0531    137 136 133* 137   < > 137   K 4.8 3.9 3.4* 4.8 3.0*   < > 3.8    106 108* 103 105   < > 104   CO2 27 26 24 23 24   < > 24   ANIONGAP 10 9* 7* 12 11   < > 13   BUN 12 11 10 11 12   < > 17   CREATININE 0.80 0.84 0.67 0.70 0.59   < > 0.82   EGFR 80 75 >90 >90 >90   < >  --    MG 2.02 1.94 1.91 1.64  --   --  1.90   CALCIUM 8.4* 8.4* 7.6* 8.2* 7.4*   < > 8.7    < > = values in this interval not displayed.     Recent Labs     01/15/25  0638 01/14/25  0606 01/13/25  0539 01/12/25  0512 07/09/24  0013 04/16/24  0835 04/16/24  0835 11/09/22  0817 07/20/21  0451 07/19/21 2010   ALBUMIN 3.3* 3.3* 3.0* 3.6 3.3*   < > 3.8 3.8   < > 4.0   ALKPHOS  --   --   --  102 89  --  105 86  --  66   ALT  --   --   --  8 6*  --  8 10  --  5*   AST  --   --   --  13 10  --  14 11  --  10   BILITOT  --   --   --  0.4 0.4  --  0.4 0.7  --  0.5    < > = values in this interval not displayed.     LIPID PANEL -   Recent Labs     01/12/25  0633 04/16/24  0835 11/09/22  0817 04/27/21  1215 10/19/20  0731   CHOL 160 220* 182 206* 212*   LDLF  --   --  120* 140* 155*    LDLCALC 104* 156*  --   --   --    HDL 42.3 50.7 52.0 49.6 40.6   TRIG 67 69 52 80 80     COAGULATION PANEL -  Recent Labs     07/20/21  1303 07/16/18  1011   INR  --  1.0   HAPTOGLOBIN 207*  --      HEME/ENDO -  Recent Labs     01/24/25  0826 01/14/25  0606 01/12/25  0633 01/12/25  0512 10/25/24  1204 04/16/24  0835 11/09/22  0817 07/21/21  0545 07/20/21  0400   FERRITIN  --  30  --   --   --   --   --   --  81   IRONSAT  --  8*  --   --   --   --   --   --  13*   TSH  --   --  3.09  --   --  2.83 2.81 1.95  --    HGBA1C 6.8*  --   --  7.3* 7.0* 7.0* 6.4*  --   --      CARDIOVASCULAR  Recent Labs     01/13/25  0539 01/13/25  0036 01/12/25 2013 01/12/25  1621 01/12/25  0633 01/12/25  0512 07/09/24  0013 07/20/21  0400 07/19/21 2010 05/20/21  1109 08/19/20  1554   LDH  --   --   --   --   --   --   --  140  --   --   --    TROPHS 61,834* 78,957* 69,505* >125,000* 135* 122*   < >  --   --   --   --    BNP  --   --   --   --   --  217*  --   --  92  --   --    CRP  --   --   --   --   --   --   --   --   --  0.15 1.50*    < > = values in this interval not displayed.     Last Cardiology/Imaging Tests Personally Reviewed (if images available) and Interpreted:  ECG:  Encounter Date: 01/12/25   ECG 12 lead   Result Value    Ventricular Rate 63    Atrial Rate 63    GA Interval 164    QRS Duration 88    QT Interval 512    QTC Calculation(Bazett) 523    P Axis 60    R Axis -1    T Axis 109    QRS Count 10    Q Onset 217    P Onset 135    P Offset 199    T Offset 473    QTC Fredericia 520    Narrative    Normal sinus rhythm with sinus arrhythmia  Anterior infarct (cited on or before 12-JAN-2025)  T wave abnormality, consider lateral ischemia  Prolonged QT  Abnormal ECG  When compared with ECG of 12-JAN-2025 09:19, (unconfirmed)  No significant change was found  Confirmed by Melvin Ang ([id number]) on 1/16/2025 1:11:37 PM   ECG 12 lead 01/12/2025    Echocardiogram:  Echocardiogram     Narrative  [place], [address] [place] [address]  Tel [phone] and Fax [phone]    TRANSTHORACIC ECHOCARDIOGRAM REPORT      Patient Name:     YARA GUZMAN     Reading Physician:  [id number] Hunter Guillen MD  Study Date:       7/21/2021          Referring           MARY SOTEROGELY  Physician:  MRN/PID:          [id number]           PCP:  Accession/Order#: [id number]          [facility] Non  Location:           Invasive  YOB: 1955          Fellow:  Gender:           F                  Nurse:              Maame Butler RN  Admit Date:       7/19/2021          Sonographer:        Amelie Parekh RD  Admission Status: Inpatient -        Additional Staff:  Priority discharge  Height:           162.56 cm          CC Report to:       [address] [place] [address]  Weight:           73.48 kg           Study Type:         Echocardiogram  BSA:              1.79 m2  Blood Pressure: 152 /67 mmHg    Diagnosis/ICD: R55-Syncope; R42-Dizziness and giddiness  Indication:    Pre-Syncope  Procedure/CPT: Echo Complete w Full Doppler-[id number]  Study Detail: The following Echo studies were performed: color flow, Doppler,  M-Mode and 2D. Technically challenging study due to body habitus  and Left sided Breast Cancer. Definity used as a contrast agent  for endocardial border definition. Total contrast used for this  procedure was 2.0 mL via IV push.      PHYSICIAN INTERPRETATION:  Left Ventricle: The left ventricular systolic function is normal, with an estimated ejection fraction of 70-75%. Estimated left ventricular mass is increased. There are no regional wall motion abnormalities. The left ventricular cavity size is normal. The left ventricular septal wall thickness is mildly increased. There is mildly increased left ventricular posterior wall thickness. There is mild to moderate concentric left ventricular hypertrophy. Spectral Doppler shows a pseudonormal pattern of left ventricular diastolic filling.  Left Atrium: The  left atrium is severely dilated.  Right Ventricle: The right ventricle is mildly enlarged. There is normal right ventricular global systolic function.  Right Atrium: The right atrium is normal in size.  Aortic Valve: The aortic valve appears structurally normal. There is mild to moderate aortic valve cusp calcification. There is evidence of mild aortic valve stenosis.  There is no evidence of aortic valve regurgitation. The peak instantaneous gradient of the aortic valve is 25.6 mmHg. The mean gradient of the aortic valve is 13.4 mmHg.  Mitral Valve: The mitral valve is mildly thickened. There is mild to moderate mitral annular calcification. There is no evidence of mitral valve regurgitation.  Tricuspid Valve: The tricuspid valve is structurally normal. No evidence of tricuspid regurgitation. The right ventricular systolic pressure is unable to be estimated.  Pulmonic Valve: The pulmonic valve is not well visualized. The pulmonic valve regurgitation was not well visualized.  Pericardium: There is no pericardial effusion noted.  Aorta: The aortic root is normal.  Systemic Veins: The inferior vena cava appears to be of normal size. There is IVC inspiratory collapse greater than 50%.  In comparison to the previous echocardiogram(s): There are no prior studies on this patient for comparison purposes.      CONCLUSIONS:  1. The left ventricular systolic function is normal with a 70-75% estimated ejection fraction.  2. Poorly visualized anatomical structures due to suboptimal image quality.  3. Spectral Doppler shows a pseudonormal pattern of left ventricular diastolic filling.  4. Increased LV mass.  5. The left atrium is severely dilated.  6. Mild aortic valve stenosis.    QUANTITATIVE DATA SUMMARY:  2D MEASUREMENTS:  Normal Ranges:  LAs:           3.77 cm    (2.7-4.0cm)  RVIDd:         2.97 cm    (0.9-3.6cm)  IVSd:          1.40 cm    (0.6-1.1cm)  LVPWd:         1.33 cm    (0.6-1.1cm)  LVIDd:         4.12 cm     (3.9-5.9cm)  LVIDs:         2.20 cm  LV Mass Index: 117.3 g/m2  LV % FS        46.5 %    LA VOLUME:  Normal Ranges:  LA Vol A4C:        87.8 ml    (22+/-6mL/m2)  LA Vol A2C:        83.0 ml  LA Vol BP:         86.0 ml  LA Vol Index A4C:  49.1 ml/m2  LA Vol Index A2C:  46.4 ml/m2  LA Vol Index BP:   48.1 ml/m2  LA Area A4C:       25.1 cm2  LA Area A2C:       24.6 cm2  LA Major Axis A4C: 6.1 cm  LA Major Axis A2C: 6.2 cm  LA Volume Index:   48.1 ml/m2  LA Vol A4C:        82.9 ml  LA Vol A2C:        77.7 ml    M-MODE MEASUREMENTS:  Normal Ranges:  Ao Root: 2.60 cm (2.0-3.7cm)    LV SYSTOLIC FUNCTION BY 2D PLANIMETRY (MOD):  Normal Ranges:  EF-A4C View: 77.6 % (>55%)  EF-A2C View: 83.5 %  EF-Biplane:  80.9 %    LV DIASTOLIC FUNCTION:  Normal Ranges:  MV Peak E:  1.12 m/s    (0.7-1.2 m/s)  MV Peak A:  1.23 m/s    (0.42-0.7 m/s)  E/A Ratio:  0.91        (1.0-2.2)  MV e'       0.06 m/s    (>8.0)  MV A Dur:   114.18 msec  E/e' Ratio: 17.20       (<8.0)    MITRAL VALVE:  Normal Ranges:  MV DT: 248 msec (150-240msec)    AORTIC VALVE:  Normal Ranges:  AoV Vmax:                2.53 m/s  (<1.7m/s)  AoV Peak P.6 mmHg (<20mmHg)  AoV Mean P.4 mmHg (1.7-11.5mmHg)  LVOT Max Jg:            1.12 m/s  (<1.1m/s)  AoV VTI:                 67.61 cm  (18-25cm)  LVOT VTI:                26.57 cm  LVOT Diameter:           1.99 cm   (1.8-2.4cm)  AoV Area, VTI:           1.23 cm2  (2.5-5.5cm2)  AoV Area,Vmax:           1.38 cm2  (2.5-4.5cm2)  AoV Dimensionless Index: 0.39    RIGHT VENTRICLE:  RV 1  4.5 cm  RV 2  3.0 cm  RV 3  8.0 cm  RV s' 0.18 m/s    TRICUSPID VALVE/RVSP:  Normal Ranges:  IVC Diam: 1.90 cm    PULMONIC VALVE:  Normal Ranges:  PV Max Jg: 1.0 m/s  (0.6-0.9m/s)  PV Max PG:  3.9 mmHg      [id number] Hunter Guillen MD  Electronically signed on 2021 at 9:44:33 PM      Transthoracic Echo (TTE) Limited 2025    Cath:  No results found for this or any previous visit from the past 1825 days.  Left  Heart Cath, No LV, Left Heart Cath, No LV 01/12/2025    [facility], Cath Lab, [address] [place] [address]    Cardiovascular Catheterization Report    Patient Name:      YARA GUZMAN         Performing Physician: [id number] Chavez MD  Study Date:        1/12/2025            Verifying Physician:  [id number] Chavez MD  MRN/PID:           [id number]  Accession#:        [id number]         Ordering Provider:    [id number] CHAVEZ  Date of Birth/Age: 1955 / 69 years Cardiologist:         [id number] Srinivas Perez MD  Gender:            F                    Fellow:               [id number] Anahy Ramirez MD  Encounter#:        [id number]      Study: Left Heart Cath with PCI Intensivist: [id number] Ackerman MD      Indications:  YARA GUZMAN is a 70 year old female who presents with dyslipidemia, hypertension and diabetes. YARA GUZMAN is a 70 year old female who presents with dyslipidemia, hypertension, diabetes and a chest pain assessment of typical angina. Acute coronary syndrome - STEMI. Objective evidence of ischemia, andacute MI.  Medical History:  Stress test performed: No. CTA performed: No. Cherise accessed: No. LVEF  Assessed: No.  Cardiac arrest: No.  Cardiac surgical consult: No.  Cardiovascular instability: No.  Frailty status of patient entering lab: 3 = Managing well.      Procedure Description:  After infiltration with 2% Lidocaine, the right radial artery was cannulated with a modified Seldinger technique. Subsequently a 6 Filipino sheath was placed in the right radial artery. After infiltration with 2% Lidocaine, a second arterial access was obtained via the right femoral artery with a modified Seldinger technique and a 6 Filipino sheath was placed. This second arterial access was obtained to allow for unable to obtain wire access to the right subclavian likely due to arteria lusoria. Femoral artery angiography was performed at the additional  arterial access site. This demonstrated a common femoral artery puncture appropriate for closure. An Angio-Seal Evolution 6F ([employer]) vascular closure device was placed per protocol.    Coronary Angiography:  The coronary circulation is co-dominant.    Left Main Coronary Artery:  The left main coronary artery is a normal caliber vessel. The left main arises normally from the left coronary sinus of Valsalva and bifurcates into the LAD and circumflex coronary arteries. The left main coronary artery showed no significant disease or stenosis greater than 30%.    Left Anterior Descending Coronary Artery Distribution:  The mid left anterior descending coronary artery showed 100% stenosis. This lesion was thrombotic. The 1st diagonal branch showed no significant disease or stenosis greater than 30%.    Circumflex Coronary Artery Distribution:  The circumflex coronary artery is a normal caliber vessel. The circumflex arises normally from the left main coronary artery and terminates in the AV groove. The mid circumflex coronary artery showed 50% stenosis. The 1st obtuse marginal branch is a small caliber vessel. The 1st obtuse marginal branch showed no significant disease or stenosis greater than 30%. The 2nd obtuse marginal branch is a small caliber vessel. The 2nd obtuse marginal branch demonstrated no significant disease or stenosis greater than 30%.    Right Coronary Artery Distribution:    The right coronary artery is a normal caliber vessel. The RCA arises normally from the right sinus of Valsalva. The RCA showed no significant disease or stenosis greater than 30%. The acute marginal branch showed no significant disease or stenosis greater than 30%.  The right posterolateral branch showed no significant disease or stenosis greater than 30%. The right posterior descending artery showed no significant disease or stenosis greater than 30%.    Coronary Interventions:  Angiography reveals a 100% stenosis of the  mid left anterior descending coronary artery. Pre-intervention JEANCARLOS flow was 0. Percutaneous coronary intervention was performed within the mid left anterior descending. The vessel was pre-dilated using a compliant balloon 2.0 mm x 12 mm at 12 ELLI. Synergy 2.5 mm x 32 mm was advanced to the lesion and implanted at 20 ELLI. The stent was post dilated using a non-compliant balloon 2.75 mm x 15 mm at 18-24 ELLI. The stenosis was successfully reduced from 100% to 0%. Post-intervention JEANCARLOS flow was 3. Heparin was given and therapeutic ACTs were obtained for the entirity of the procedure. Patient had been loaded with ASA and Ticagrelor. The LM was engaged using a 6 Fr IKARI 3.5 guide catheter. A RunThrough was placed into the distal LAD. Initial pre-dilation was performed using a 2.0 x 12 Euphora SC at 12 elli. Two boluses of Integrelin were given. Subsequent angiogram showed JEANCARLOS 3 flow but residual thrombus in the mid LAD. [employer] aspiration  catheter was advanced and 2 runs of aspiration were performed. A 2.5 x 32 Synergy LIAM was then deployed at 20 elli. OCT demonstrated mild malapposition without any proximal or distal edge dissections. Post-dilation was performed using a 2.75 x 15 NC Euphora at 18-24 elli with care taken to avoid the distal most edge of the stented segment. Final angiograms showed JEANCARLOS 3 flow without any residual stenosis, guide dissection, or distal wire perforation. Equipment was removed and the femoral arteriotomy was closed with an Angioseal.    Coronary Lesion Summary:  Vessel       Stenosis    Vessel Segment  LAD        100% stenosis      mid  Circumflex 50% stenosis       mid      Hemo Personnel:  +----------------+---------+  Name            Duty       +----------------+---------+  Chance Chavez MD 1  +----------------+---------+      Hemodynamic Pressures:    +----+-------------------+---------+------------+-------------+------+---------+  Site     Date Time       Phase     Systolic    Diastolic    ED  Mean mmHg                           Name       mmHg        mmHg      mmHg            +----+-------------------+---------+------------+-------------+------+---------+    AO  1/12/2025 7:22:35 AIR REST         143           72            102                       AM                                                   +----+-------------------+---------+------------+-------------+------+---------+    LV  1/12/2025 7:36:30 AIR REST         157           11    23                                AM                                                   +----+-------------------+---------+------------+-------------+------+---------+    LV  1/12/2025 7:36:38 AIR REST         157           11    25                                AM                                                   +----+-------------------+---------+------------+-------------+------+---------+   LVp  1/12/2025 7:36:50 AIR REST         154           14    26                                AM                                                   +----+-------------------+---------+------------+-------------+------+---------+    AO  1/12/2025 7:53:06 AIR REST         106           55             75                       AM                                                   +----+-------------------+---------+------------+-------------+------+---------+      Cardiac Cath Post Procedure Notes:  Post Procedure Diagnosis: Successful PCI of the LAD.  Blood Loss:               Estimated blood loss during the procedure was 10 mls.  Specimens Removed:        Number of specimen(s) removed: none.      Recommendations:  Optimize medical therapy.  Agressive risk factor modification efforts.  Anti-platelet therapy with Aspirin and Ticagrelor 90mgs BID.  Consider referral to cardiac rehabilitation.  Medical management of coronary artery disease.  Follow recommendations  per Dr Perez/Dr Ackerman.    ____________________________________________________________________________________  CONCLUSIONS:  1. Anterior STEMI  2. LAD: mid 100% thrombotic stenosis s/p aspiration thrombectomy and OCT guided PCI with deployement of a 2.5 x 32 Synergy LIAM.  3. Rt dominant system. LM: no obstructive CAD. LCX: mid 50% stenosis. RCA: no obstructive CAD  4. Successful PPCI -LAD with Penumbra thrombectomy and LIAM with OCT guidance: Excellent results JEANCARLOS 3 flow and zero residual stenosis.    ICD 10 Codes:  ST elevation (STEMI) myocardial infarction involving left anterior descending coronary artery-I21.02    CPT Codes:  Ultrasound guidance for vascular access-[id number]; Revasc during AMI stent/angio/atherc,ins asp Thrombectomy, Left Anterior Descending single Vessel (PCI)-08359.LD; Thrombectomy, aspiration-75847; OCT Initial Vessel (coronary native vessel or graft) during diagnostic evaluation and/or therapeutic intervention, initial vessel-61239; Moderate Sedation Services initial 15 minutes patient >5 years-63497; Moderate Sedation Services 2nd additional 15 minutes patient >5 years-74141; Moderate Sedation Services 1st additional 15 minutes patient >5 years-99304; Moderate Sedation Services 3rd additional 15 minutes patient >5 years-68893; Moderate Sedation Services 4th additional 15 minutes patient >5 years-47041    [id number] Brett Chavez MD  Performing Physician  Electronically signed by [id number] Brett Chavez MD on 1/12/2025 at 7:01:18  PM          ** Final **  Cardiac Catheterization Procedure 01/12/2025    Stress Test:  NM CARDIAC STRESS REST (MYOCARDIAL PERFUSION MIBI) 05/25/2021    Narrative  MRN: [id number]  Patient Name: YARA GUZMAN    STUDY:  PART 2 STRESS OR REST (NO CHARGE); CARDIAC STRESS/REST (MYOCARDIAL  PERFUSION/MIBI); CARDIAC STRESS/REST INJECTION;  5/25/2021 12:43 pm;  5/25/2021 12:38 pm; 5/25/2021 10:51 am    INDICATION:  Chest pain.    COMPARISON:  None.    ACCESSION  NUMBER(S):  [id number]; [id number]; [id number]    ORDERING CLINICIAN:  Kayla Goodwin CNP    TECHNIQUE:  DIVISION OF NUCLEAR MEDICINE  PHARMACOLOGIC STRESS MYOCARDIAL PERFUSION SCAN, ONE DAY PROTOCOL    The patient received an intravenous dose of 10.1 mCi of Tc-99m  Myoview and resting emission tomographic (SPECT) images of the  myocardium were acquired. The patient then received an intravenous  infusion of 0.4 mg regadenoson (Lexiscan) followed by an additional  dose of 32.3 mCi of Tc-99m  Myoview. Stress phase SPECT images of the  myocardium were then acquired. These included ECG-gated images to  assess and quantify ventricular function.    FINDINGS:  Stress and rest images both demonstrate a normal distribution of  perfusion throughout all LV segments with no sign of ischemia except  for moderately reduced apical perfusion consistent with breast  attenuation artifact. Apical ischemia can not be completely excluded.    TID: 1.06    ECG-gated images demonstrate normal LV size and myocardial  contractility with an LV ejection fraction of 78 % (normal above 45  percent).    Impression  1. Relatively normal stress myocardial perfusion imaging in response  to pharmacologic stress in the setting of moderate breast attenuation.  2. Well-maintained left ventricular function.  No results found for this or any previous visit from the past 1095 days.    Cardiac CT/MRI:  CT HEART CALCIUM SCORING WO IV CONTRAST 05/06/2021    Narrative  MRN: [id number]  Patient Name: YARA GUZMAN    STUDY:  CT CARDIAC SCORING;  5/6/2021 8:30 am    INDICATION:  CP.    COMPARISON:  None.    ACCESSION NUMBER(S):  [id number]    ORDERING CLINICIAN:  MARIELLA KENNEDY    TECHNIQUE:  Using prospective ECG gating, CT scan of the coronary arteries was  performed without intravenous contrast. Coronary calcium scoring  was  performed according to the method of Agatston.    FINDINGS:  The score and distribution of calcium in the coronary arteries is as  follows:    LM  "3.3    LCx 77  RCA 7.5    Total 208    The visualized mid/lower ascending thoracic aorta measures 3.0 cm in  diameter. Prominence of the main pulmonary artery measuring 39 mm.  Can not exclude pulmonary artery hypertension.. The heart is normal  in size. No pericardial effusion is present.    No gross evidence of mediastinal or hilar lymphadenopathy or masses  is identified. The visualized segments of the lungs are hyperinflated.    The visualized subdiaphragmatic structures appear intact.    Impression  1. Coronary artery calcium score of 208*.    *Coronary artery calcium scoring may be helpful in predicting the  risk for future coronary heart disease events.  According to the  American College of Cardiology Foundation Clinical Expert Consensus  Task Force, such testing provides important prognostic information in  patients with more than one coronary heart disease risk factor. The  coronary artery calcium score correlates with the annual risk of a  non-fatal myocardial infarction or coronary heart disease death.    Coronary artery score            Annual Risk    0-99                             0.4%  100-399                        1.3%  >400                            2.4%    These three \"breakpoints\" correspond to lower, intermediate and high  risk states for future coronary events.  Such information should be  used, along with appropriate clinical judgment, to make decisions  regarding the intensity of risk factor management strategies to treat  blood lipids and to modify other non-lipid coronary risk factors.    Reference: Darwin P et al. Circulation.  2007; 115:402-426  No results found for this or any previous visit from the past 1095 days.    Other CT:      CV RISK FACTORS:   # Hypertension: Last BP:  .  # Hyperlipidemia: Last Tchol 160 / LDL No results found for requested labs within last 365 days. / HDL 42.3 / TRIG 67 (1/12/2025:  6:33 AM).  # Type II Diabetes Mellitus: Last A1c 6.8 (1/24/2025:  " 8:26 AM).  # Obesity: Last BMI:  .  # CKD: Last BUN/Cr (GFR): 12/0.80 (80), 1/15/2025:  6:38 AM.    ASCV RISK:  The ASCVD Risk score (Colton POWELL, et al., 2019) failed to calculate for the following reasons:    Risk score cannot be calculated because patient has a medical history suggesting prior/existing ASCVD    Assessment/Plan   Marsha Tapia is a 69-year-old female with a complex medical history including hypertension, hyperlipidemia, asymptomatic sinus bradycardia, type 2 diabetes, breast cancer status post mastectomy/chemotherapy/radiation, IgA deficiency, aortic stenosis, osteoarthritis, GERD, history of gastric bypass, and a STEMI with recent PCI to the mid-LAD. She presents today for follow-up following her STEMI on 1/12/2025. At that time, she experienced anterior STEMI due to 100% thrombotic occlusion of the mid-LAD, successfully treated with aspiration thrombectomy and PCI (Synergy 2.5x32 LIAM). Initial echocardiogram revealed normal LV ejection fraction (60%), septal wall motion abnormalities, and mild-to-moderate aortic stenosis.    Since discharge, the patient reports feeling well, actively participating in cardiac rehab without issues, adhering to prescribed medications without complications, and performing activities of daily living normally. She denies chest pain, dyspnea, orthopnea, PND, palpitations, dizziness, or syncope.    Current Medications: ASA and Ticagrelor for dual antiplatelet therapy (DAPT) - Atorvastatin 80 mg daily - Valsartan 160mg     Recent Labs/Imaging:  -Echocardiogram: Normal LV systolic function (EF 60%), abnormal septal wall motion, elevated left atrial pressure, mild-to-moderate aortic stenosis.  -HgbA1C: 7.3%    Assessment: 69-year-old female post-anterior STEMI and mid-LAD PCI, doing well overall with no current symptoms of ischemia or heart failure. Echocardiogram findings indicate mild to moderate aortic stenosis and septal wall motion abnormalities secondary to ischemia.  Hypertension and diabetes are reasonably controlled, but close monitoring is essential to maintain optimal secondary prevention and prevent restenosis or recurrent ischemic events.    Differential Diagnoses:  #Stable post-STEMI with residual wall motion abnormalities  #Progression of mild to moderate aortic stenosis  #Diastolic dysfunction with elevated left atrial pressure    Plan:  -Secondary prevention: Continue DAPT (ASA 81 mg + Ticagrelor 90 mg BID for 1 year) followed by lifelong ASA monotherapy. Ensure compliance without interruption.  -Lipid management: Maintain Atorvastatin 80 mg daily, recheck lipid panel in 12 weeks.  -Blood pressure: Continue Valsartan 160mg daily. Monitor BP closely, especially given prior episodes of elevated pressures.  -Diabetes management: Optimize glycemic control with primary care. Recommend re-evaluation of HbA1c goal (<7%) depending on functional status.  -Cardiac rehab: Continue participation with gradual increase in intensity as tolerated.  -Aortic stenosis: Monitor progression of AS with serial echocardiograms every 12 months.    Follow-up:  Cardiovascular clinic follow-up in 3 months or sooner if symptoms develop.  Echo follow-up in 6 months to assess any changes in LV function or valve disease.    Patient Counseling: The patient was reassured that her current progress is favorable. Discussed the importance of medication adherence, regular follow-ups, and the warning signs of recurrent ischemia or heart failure (e.g., chest pain, shortness of breath, swelling) requiring immediate medical attention.    Overall, the patient is on track, and her condition is stable under current therapy.    Recommendations:  - Continue medications> Aspirin, Brilinta, Valsartan, Atorvastatin  - Continue Cardiac rehabilitation  - Follow up in clinic in 3 months with the result of a lipid panel    - Patient will follow up with me in the Cardiology office once the results are available  - I spent  45 minutes assessing the case between pre-charting, face-to-face patient interaction, and documentation    Srinivas Perez MD

## 2025-02-03 NOTE — LETTER
February 3, 2025     Patient: Marsha Tapia   YOB: 1955   Date of Visit: 2/3/2025       To Whom It May Concern:    I am writing on behalf of Marsha Tapia, a 69-year-old patient under my care following a recent STEMI and successful percutaneous coronary intervention.     Based on her current clinical status, I am confident she is ready to begin a phased return to work. I recommend the following schedule:  -Start Date: February 10, 2025  -Work Schedule: 3 days per week for the next 4 weeks  -Full Return: After this initial 4-week period, she may resume her regular work schedule without restrictions, provided she continues to feel well.  This phased approach is designed to allow for a smooth and safe transition while minimizing any undue stress on her recovery. She should continue monitoring her health and report any symptoms such as chest pain, fatigue, or shortness of breath.    If you have any questions or concerns, please don't hesitate to call.       Sincerely,      Srinivas Burrell MD    Cardiologist    CC: No Recipients

## 2025-02-05 ENCOUNTER — CLINICAL SUPPORT (OUTPATIENT)
Dept: CARDIAC REHAB | Facility: CLINIC | Age: 70
End: 2025-02-05
Payer: MEDICARE

## 2025-02-05 DIAGNOSIS — I21.02 ST ELEVATION MYOCARDIAL INFARCTION INVOLVING LEFT ANTERIOR DESCENDING (LAD) CORONARY ARTERY (MULTI): ICD-10-CM

## 2025-02-05 DIAGNOSIS — I21.3 STEMI (ST ELEVATION MYOCARDIAL INFARCTION) (MULTI): ICD-10-CM

## 2025-02-05 PROCEDURE — 93798 PHYS/QHP OP CAR RHAB W/ECG: CPT | Performed by: INTERNAL MEDICINE

## 2025-02-05 NOTE — PROGRESS NOTES
Marsha Tapia  1955  [id number]  69 y.o.    [facility] [id number]      Cardiac/Pulmonary Rehab Nutrition Education    2/5/2025  Label reading education was done with program's RDN during today's visit. Label Reading Highlights handout was given with information discussed during appointment and to assist in review of label contents at home.      Signature Carolyne Pedraza RDN, LD

## 2025-02-07 DIAGNOSIS — I25.10 CORONARY ARTERY DISEASE DUE TO LIPID RICH PLAQUE: Primary | ICD-10-CM

## 2025-02-07 DIAGNOSIS — J01.90 ACUTE NON-RECURRENT SINUSITIS, UNSPECIFIED LOCATION: ICD-10-CM

## 2025-02-07 DIAGNOSIS — I25.83 CORONARY ARTERY DISEASE DUE TO LIPID RICH PLAQUE: Primary | ICD-10-CM

## 2025-02-07 PROCEDURE — RXMED WILLOW AMBULATORY MEDICATION CHARGE

## 2025-02-07 RX ORDER — ATORVASTATIN CALCIUM 20 MG/1
20 TABLET, FILM COATED ORAL DAILY
Qty: 90 TABLET | Refills: 2 | Status: SHIPPED | OUTPATIENT
Start: 2025-02-07

## 2025-02-10 ENCOUNTER — CLINICAL SUPPORT (OUTPATIENT)
Dept: CARDIAC REHAB | Facility: CLINIC | Age: 70
End: 2025-02-10
Payer: MEDICARE

## 2025-02-10 ENCOUNTER — PHARMACY VISIT (OUTPATIENT)
Dept: PHARMACY | Facility: CLINIC | Age: 70
End: 2025-02-10
Payer: COMMERCIAL

## 2025-02-10 DIAGNOSIS — I21.02 ST ELEVATION MYOCARDIAL INFARCTION INVOLVING LEFT ANTERIOR DESCENDING (LAD) CORONARY ARTERY (MULTI): Primary | ICD-10-CM

## 2025-02-10 DIAGNOSIS — I21.3 STEMI (ST ELEVATION MYOCARDIAL INFARCTION) (MULTI): ICD-10-CM

## 2025-02-10 PROCEDURE — RXMED WILLOW AMBULATORY MEDICATION CHARGE

## 2025-02-10 PROCEDURE — 93798 PHYS/QHP OP CAR RHAB W/ECG: CPT | Performed by: INTERNAL MEDICINE

## 2025-02-10 RX ORDER — BENZONATATE 100 MG/1
100 CAPSULE ORAL 3 TIMES DAILY PRN
Qty: 42 CAPSULE | Refills: 0 | Status: SHIPPED | OUTPATIENT
Start: 2025-02-10 | End: 2025-03-12

## 2025-02-10 NOTE — PROGRESS NOTES
Diet Habit Survey results reviewed with pt.  Dietitian's comments and recommendations given to Pt for review.

## 2025-02-12 ENCOUNTER — PHARMACY VISIT (OUTPATIENT)
Dept: PHARMACY | Facility: CLINIC | Age: 70
End: 2025-02-12
Payer: COMMERCIAL

## 2025-02-12 ENCOUNTER — PATIENT OUTREACH (OUTPATIENT)
Dept: PRIMARY CARE | Facility: CLINIC | Age: 70
End: 2025-02-12

## 2025-02-12 ENCOUNTER — CLINICAL SUPPORT (OUTPATIENT)
Dept: CARDIAC REHAB | Facility: CLINIC | Age: 70
End: 2025-02-12
Payer: MEDICARE

## 2025-02-12 VITALS — HEIGHT: 65 IN | WEIGHT: 167 LBS | BODY MASS INDEX: 27.82 KG/M2

## 2025-02-12 DIAGNOSIS — I21.3 STEMI (ST ELEVATION MYOCARDIAL INFARCTION) (MULTI): ICD-10-CM

## 2025-02-12 DIAGNOSIS — I21.02 ST ELEVATION MYOCARDIAL INFARCTION INVOLVING LEFT ANTERIOR DESCENDING (LAD) CORONARY ARTERY (MULTI): ICD-10-CM

## 2025-02-12 PROCEDURE — RXMED WILLOW AMBULATORY MEDICATION CHARGE

## 2025-02-12 PROCEDURE — 93798 PHYS/QHP OP CAR RHAB W/ECG: CPT | Performed by: INTERNAL MEDICINE

## 2025-02-12 RX ORDER — NYSTATIN 100000 U/G
CREAM TOPICAL
Qty: 30 G | Refills: 1 | OUTPATIENT
Start: 2024-03-29

## 2025-02-12 RX ORDER — BENZONATATE 100 MG/1
CAPSULE ORAL
Qty: 40 CAPSULE | Refills: 1 | OUTPATIENT
Start: 2024-05-28

## 2025-02-12 NOTE — PROGRESS NOTES
Cardiac Rehab Education  Marsha Tapia   [id number]    2/12/2025  Resistance training education was done. Resistance bands were measured and cut for patient to begin home strength training exercises. Frequency, intensity/RPE, and form were reviewed with educational handout given for further reference. Patient was encouraged to comeback with any questions.      Signature Cari De Leon RN

## 2025-02-12 NOTE — PATIENT INSTRUCTIONS
"           2/12/2025   Marsha Tapia  [id number]  69 y.o.  Initial Body Composition           Height: Height: 166.1 cm (5' 5.4\")   Weight: Weight: 75.8 kg (167 lb)   BMI: Body mass index is 27.45 kg/m².    BMI is a ratio of body weight to height squared (kg/m2).  For most people, obesity-related problems increase beyond a BMI of 25.  An increased risk of hypertension, coronary disease and mortality are associated with a BMI > 30.   For more information on BMI visit: www.americanheart.org.    Desirable = below 25      Overweight = 25.0 - 29.0      Obesity = 30.0 or greater    Waist Circumference is an important predictor of cardiovascular risk in people with extra adipose (fat) tissue in the abdominal area.  For our purposes, we measure waist circumference to assess change in body fatness when participating in a cardiac rehabilitation program    Your recommended weight is below: 151.8 LBS.    Your goal should be to lose 1 LB. per week or 1-2 inches around your waist while enrolled in the Phase II Cardiac Program.    COMMENTS: Achieve at least 8,000 steps per day.               "
normal...

## 2025-02-12 NOTE — PROGRESS NOTES
Marsha Tapia  02/12/25    Pre-rehab body composition was given to the patient in today's session. Patient was given home exercise prescription and 2 week exercise log. Patient reports that she has stationary bike at home and plans to exercise using that at home.    Signature: Sherrill Burns, [facility]

## 2025-02-14 ENCOUNTER — CLINICAL SUPPORT (OUTPATIENT)
Dept: CARDIAC REHAB | Facility: CLINIC | Age: 70
End: 2025-02-14
Payer: MEDICARE

## 2025-02-14 DIAGNOSIS — I21.3 STEMI (ST ELEVATION MYOCARDIAL INFARCTION) (MULTI): ICD-10-CM

## 2025-02-14 DIAGNOSIS — I21.02 ST ELEVATION MYOCARDIAL INFARCTION INVOLVING LEFT ANTERIOR DESCENDING (LAD) CORONARY ARTERY (MULTI): Primary | ICD-10-CM

## 2025-02-14 PROCEDURE — 93798 PHYS/QHP OP CAR RHAB W/ECG: CPT | Performed by: INTERNAL MEDICINE

## 2025-02-17 ENCOUNTER — APPOINTMENT (OUTPATIENT)
Dept: CARDIAC REHAB | Facility: CLINIC | Age: 70
End: 2025-02-17
Payer: MEDICARE

## 2025-02-19 ENCOUNTER — CLINICAL SUPPORT (OUTPATIENT)
Dept: CARDIAC REHAB | Facility: CLINIC | Age: 70
End: 2025-02-19
Payer: MEDICARE

## 2025-02-19 DIAGNOSIS — I21.3 STEMI (ST ELEVATION MYOCARDIAL INFARCTION) (MULTI): ICD-10-CM

## 2025-02-19 DIAGNOSIS — I21.02 ST ELEVATION MYOCARDIAL INFARCTION INVOLVING LEFT ANTERIOR DESCENDING (LAD) CORONARY ARTERY (MULTI): Primary | ICD-10-CM

## 2025-02-19 PROCEDURE — 93798 PHYS/QHP OP CAR RHAB W/ECG: CPT | Performed by: INTERNAL MEDICINE

## 2025-02-19 NOTE — PROGRESS NOTES
Cardiac Rehab Education  Marsha Tapia   [id number]    2/19/2025  Cardiovascular changes with exercise were discussed in today's session. Both immediate and long term effects of exercise were covered and the importance of cooling down post workout was reviewed.     Patricia Burns, [facility]EP

## 2025-02-20 ENCOUNTER — DOCUMENTATION (OUTPATIENT)
Dept: CARDIAC REHAB | Facility: CLINIC | Age: 70
End: 2025-02-20
Payer: MEDICARE

## 2025-02-20 VITALS — DIASTOLIC BLOOD PRESSURE: 56 MMHG | HEART RATE: 73 BPM | SYSTOLIC BLOOD PRESSURE: 102 MMHG

## 2025-02-20 DIAGNOSIS — I21.3 STEMI (ST ELEVATION MYOCARDIAL INFARCTION) (MULTI): Primary | ICD-10-CM

## 2025-02-20 NOTE — PROGRESS NOTES
Cardiac Rehabilitation 30 Day Reassessment    Name: Marsha Tapia  Medical Record Number: [id number]  YOB: 1955  Age: 69 y.o.    Today’s Date: 2/20/2025  Primary Care Physician: Stephane Sandhu MD  Referring Physician: Srinivas Ramirez MD  Program Location: [address]     General  Primary Diagnosis:   1. STEMI (ST elevation myocardial infarction) (Multi)             Onset/Date of Diagnosis: 1/12/2025    Session #: 9    AACVPR Risk Stratification: High    Falls Risk: High  Psychosocial Assessment   Pre PHQ-9: 6   Sent PH-Q 9 to MD if score > 20: No; score < 20    Pt reported/currently experiencing stress: No  Patient uses stress management skills: No   History of: depression  Currently seeing a mental health provider: No  Social Support: Yes, Whom:family  friend  Quality of Life Survey: SF-36   SF-36 Pre Post   Physical Component Score TBD TBD   Mental Component Score TBd TBD     Learning Assessment:  Learning assessment/barriers: None  Preferred learning method: Auditory and Visual  Barriers: None  Comments:    Stages of Change:Action    Psychosocial Plan    Goal Status: In progress    Psychosocial Goals: Demonstrating proper techniques for stress management, Maintain or lower PH-Q 9 score by discharge, and Identify strategies for managing depression    Psychosocial Interventions/Education:   2/20/2025 Patient has been efficiently managing stress.    Nutrition Assessment:    Hyperlipidemia: Yes     Lipids:   Lab Results   Component Value Date    CHOL 160 01/12/2025    HDL 42.3 01/12/2025    LDLF 120 (H) 11/09/2022    TRIG 67 01/12/2025       Current Dietary Guidelines: Low fat, Low sodium  Barriers to dietary change: no    Diet Habit Survey: Picture Your Plate  Pre:  44  Post: To be done at discharge.    Diabetes Assessment    Lab Results   Component Value Date    HGBA1C 6.8 (A) 01/24/2025       History of Diabetes: No    Weight Management       No data recorded    Nutrition Plan    Goal  Status: In progress    Nutrition Goals: Lipid Goal: HDL>45, LDL <70, Total <180, Trigs <150, Improve Diet Habit Survey score by 5-10 points by discharge, Adapt a low-sodium, DASH diet prior to discharge, Adapt a Mediterranean focused diet prior to discharge, Learn how to read and interpret nutrition labels prior to discharge, and Lose 1lb/week while enrolled in program    Nutrition Interventions/Education:   2/5/2025  Label reading education was done with program's RDN during today's visit. Label Reading Highlights handout was given with information discussed during appointment and to assist in review of label contents at home.    2/10/2025 Diet Habit Survey results reviewed with pt. Dietitian's comments and recommendations given to Pt for review.    Exercise Assessment    No  Mode: NA  Frequency: NA  Duration: NA    Exercise Prescription     Exercise Prescription based on: Duke Activity Status Index (DASI)    DASI Score: 10.7  MET Score: 4.1   Frequency:  3 days/week   Mode: Treadmill, NuStep, and Recumbent Cycle   Duration:  33 total aerobic minutes   Intensity: RPE 12-16  Target HR:     MET Level: 2.6  Patient wears supplemental O2: No     Modality Workload METs Duration (minutes)   1 Pre-Exercise      2 Treadmill 2.1 mph  2.6 11 :00   3 NuStep 3@48 burt  2.5 11 :00   4 Recumbent Bike 3@50 RPM  2.3 11 :00   5 Cooldown    5 :00   6 Post-Exercise        Resistance Training: No   Home Exercise Prescription given: Yes    Exercise Plan    Goal Status: In progress    Exercise Goals: Increase exercise MET level by 5-10% each week, Increase total exercise duration to 30-45 minutes, Obtain 150 minutes/week of moderate intensity aerobic exercise, and Establish a home exercise program before discharge    Exercise Interventions/Education:   2/12/2025 Pre-rehab body composition was given to the patient in today's session. Patient was given home exercise prescription and 2 week exercise log. Patient reports that she has  "stationary bike at home and plans to exercise using that at home.  2025  Resistance training education was done. Resistance bands were measured and cut for patient to begin home strength training exercises. Frequency, intensity/RPE, and form were reviewed with educational handout given for further reference. Patient was encouraged to comeback with any questions.      Other Core Components/Risk Factor Assessment:    Medication adherence  Current Medications:   Medication Documentation Review Audit       Reviewed by Srinivas Perez MD (Physician) on 25 at 1007      Medication Order Taking? Sig Documenting Provider Last Dose Status   amoxicillin-pot clavulanate (Augmentin) 875-125 mg tablet [id number]  Take 1 tablet by mouth 2 times a day for 7 days. Stephane Sandhu MD   25 2359   aspirin 81 mg EC tablet [id number] Yes Take 1 tablet (81 mg) by mouth once daily. XOCHITL Garza-CNP  Active   atorvastatin (Lipitor) 80 mg tablet [id number] Yes Take 1 tablet (80 mg) by mouth once daily. XOCHITL Garza-CNP  Active   benzonatate (Tessalon) 100 mg capsule [id number] Yes Take 1 capsule (100 mg) by mouth 3 times a day as needed for cough. Do not crush or chew. Stephane Sandhu MD  Active   docusate sodium (Colace) 100 mg capsule [id number] Yes Take 1 capsule (100 mg) by mouth twice a day. Historical Provider, MD  Active   fluticasone (Flonase) 50 mcg/actuation nasal spray [id number] Yes Administer 2 sprays into affected nostril(s) once daily. Historical Provider, MD  Active   foam bandage (Allevyn) 2 X 2 \" bandage [id number] Yes Apply to right axilla as directed John Aguila MD  Active   metFORMIN (Glucophage) 500 mg tablet [id number] Yes Take 1 tablet (500 mg) by mouth 2 times daily (morning and late afternoon). Stephane Sandhu MD  Active   nitroglycerin (Nitrostat) 0.4 mg SL tablet [id number] Yes Place 1 tablet (0.4 mg) under the tongue every 5 minutes if needed for chest pain. " Stephane Sandhu MD  Active   ticagrelor (Brilinta) 90 mg tablet [id number] Yes Take 1 tablet (90 mg) by mouth 2 times a day. Stephane Sandhu MD  Active   valsartan (Diovan) 160 mg tablet [id number] Yes Take 1 tablet (160 mg) by mouth once daily. Stephane Sandhu MD  Active                                 Medication compliance: Yes   Uses pill box/organizer: No    Carries medication list: No     Blood Pressure Management  History of Hypertension: Yes   Medication Changes: No   Resting BP:  Visit Vitals  /56 Comment: Post-exercise   Pulse 73          Heart Failure Management  Hx of Heart Failure: No    Smoking/Tobacco Assessment  Social History     Tobacco Use   Smoking Status Never   Smokeless Tobacco Not on file       Other Core Component Plan    Goal Status: In progress    Other Core Component Goals: Verbalize medication usage and drug actions by discharge, Verbalize SL NTG action and proper dosage by discharge, and Achieve resting BP of < 130/80 by discharge    Other Core Component Interventions/Education:   1/29/2025  Risk factors of cardiovascular disease were discussed today's. Education on modifiable and non-modifiable risk factors of CVD and recommendations to manage them was done today. Handouts were provided along with further reference. Patient was educated to return with further questions.  2/19/2025  Cardiovascular changes with exercise were discussed in today's session. Both immediate and long term effects of exercise were covered and the importance of cooling down post workout was reviewed.     Individual Patient Goals:    Increase strength and endurance to help with balance by d/c  Learn healthy eating habits by education by midpoint     Goal Status: In progress    Staff Comments:  Marsha has completed 9 sessions of Cardiac Rehab. Patient  has been consistent with attendance. Patient has increased duration on Treadmill, NuStep, and Recumbent Cycle modalities.  No cardiac complaints have been voiced  and no ectopy has been noted on ECG. BP have been WNL. Patient was given pre rehab body composition and PYP reviewed from dietician. Patient attended education lecture on label reading, resistance training, cardiovascular risk factors and changes with exercise.    Rehab Staff Signature: Sherrill Burns ACSM-EP

## 2025-02-21 ENCOUNTER — CLINICAL SUPPORT (OUTPATIENT)
Dept: CARDIAC REHAB | Facility: CLINIC | Age: 70
End: 2025-02-21
Payer: MEDICARE

## 2025-02-21 ENCOUNTER — OFFICE VISIT (OUTPATIENT)
Dept: PRIMARY CARE | Facility: CLINIC | Age: 70
End: 2025-02-21
Payer: MEDICARE

## 2025-02-21 VITALS
DIASTOLIC BLOOD PRESSURE: 70 MMHG | TEMPERATURE: 97.9 F | WEIGHT: 167.2 LBS | BODY MASS INDEX: 27.48 KG/M2 | SYSTOLIC BLOOD PRESSURE: 130 MMHG | HEART RATE: 64 BPM | RESPIRATION RATE: 16 BRPM

## 2025-02-21 DIAGNOSIS — I21.3 STEMI (ST ELEVATION MYOCARDIAL INFARCTION) (MULTI): ICD-10-CM

## 2025-02-21 DIAGNOSIS — I21.02 ST ELEVATION MYOCARDIAL INFARCTION INVOLVING LEFT ANTERIOR DESCENDING (LAD) CORONARY ARTERY (MULTI): Primary | ICD-10-CM

## 2025-02-21 DIAGNOSIS — E13.69 OTHER SPECIFIED DIABETES MELLITUS WITH OTHER SPECIFIED COMPLICATION, UNSPECIFIED WHETHER LONG TERM INSULIN USE (MULTI): ICD-10-CM

## 2025-02-21 PROCEDURE — 3078F DIAST BP <80 MM HG: CPT | Performed by: INTERNAL MEDICINE

## 2025-02-21 PROCEDURE — 99213 OFFICE O/P EST LOW 20 MIN: CPT | Performed by: INTERNAL MEDICINE

## 2025-02-21 PROCEDURE — 93798 PHYS/QHP OP CAR RHAB W/ECG: CPT | Performed by: INTERNAL MEDICINE

## 2025-02-21 PROCEDURE — 3075F SYST BP GE 130 - 139MM HG: CPT | Performed by: INTERNAL MEDICINE

## 2025-02-21 PROCEDURE — 3049F LDL-C 100-129 MG/DL: CPT | Performed by: INTERNAL MEDICINE

## 2025-02-21 PROCEDURE — 3051F HG A1C>EQUAL 7.0%<8.0%: CPT | Performed by: INTERNAL MEDICINE

## 2025-02-21 PROCEDURE — G2211 COMPLEX E/M VISIT ADD ON: HCPCS | Performed by: INTERNAL MEDICINE

## 2025-02-21 PROCEDURE — 4010F ACE/ARB THERAPY RXD/TAKEN: CPT | Performed by: INTERNAL MEDICINE

## 2025-02-21 NOTE — PROGRESS NOTES
Marsha Tapia is a 69 y.o. female   Patient with a past medical history of breast CA s/p mastectomy, chemo and radiation, DM, HTN, HLD, IgA deficiency, chronic diarrhea, CAD s/p PCI of LAD (1/25), Osteoarthritis, severe cervical central canal stenosis with cord compression and evidence of cord edema at C3-4 and C5-6,s/p C3-C6 laminoplasty on 7/23/21, Hearing loss, Colonoscopy due in 2031, MAmmogram (March)    The patient's cough has resolved after stopping the ACE inhibitor and switching to ARB  She had a bloody nose the other day and after sneezing she noticed bruising on her right breast  That is why she came in today  Denies any pain in the breast  No other sites of bruising noted  Is scheduled for mammogram this Thursday         Review of Systems     Constitutional: no fever, no chills, not feeling poorly, not feeling tired and no recent weight gain, no recent weight loss.   ENT: no earache, no hearing loss, no nosebleeds, no nasal discharge, no sore throat and no hoarseness.   Cardiovascular: the heart rate was not slow, the heart rate was not fast, no chest pain, no palpitations, no intermittent leg claudication and no lower extremity edema.   Respiratory: Cough  Gastrointestinal: no abdominal pain, no constipation, no melena, no nausea, no diarrhea, no vomiting and no blood in stools.   Musculoskeletal: no arthralgias, no myalgias, no back pain, no joint swelling, no joint stiffness, no limb pain and no limb swelling.   Integumentary: no skin rashes, no skin lesions, no itching, no skin wound and no dry skin.   Neurological: no headache, no confusion, no numbness, no dizziness, no tingling and no fainting.   All other systems have been reviewed and are negative for complaint.     Current Outpatient Medications   Medication Instructions    aspirin 81 mg, oral, Daily    atorvastatin (Lipitor) 20 mg tablet TAKE 1 TABLET BY MOUTH EVERY DAY    atorvastatin (LIPITOR) 80 mg, oral, Daily    benzonatate (TESSALON) 100  mg, oral, 3 times daily PRN, Do not crush or chew.    Brilinta 90 mg, oral, 2 times daily    docusate sodium (COLACE) 100 mg, 2 times daily    metFORMIN (GLUCOPHAGE) 500 mg, oral, 2 times daily (morning and late afternoon)    nitroglycerin (NITROSTAT) 0.4 mg, sublingual, Every 5 min PRN    valsartan (DIOVAN) 160 mg, oral, Daily         Vitals:    02/21/25 1153   BP: 130/70   Pulse: 64   Resp: 16   Temp: 36.6 °C (97.9 °F)        Physical Exam     Constitutional   General appearance: Alert and in no acute distress.     Pulmonary   Respiratory assessment: No respiratory distress, normal respiratory rhythm and effort.    Auscultation of Lungs: Clear bilateral breath sounds.   Cardiovascular   Auscultation of heart: Apical pulse normal, heart rate and rhythm normal, normal S1 and S2, no murmurs and no pericardial rub.    Exam for edema: No peripheral edema.   Abdomen   Abdominal Exam: No bruits, normal bowel sounds, soft, non-tender, no abdominal mass palpated.    Liver and Spleen exam: No hepato-splenomegaly.   Musculoskeletal   Examination of gait: Normal.    Inspection of digits and nails: No clubbing or cyanosis of the fingernails.    Inspection/palpation of joints, bones and muscles: No joint swelling. Normal movement of all extremities.   Skin   Skin inspection: Normal skin color and pigmentation, normal skin turgor and no visible rash.   Neurologic   Cranial nerves: Nerves 2-12 were intact, no focal neuro defects.    Assessment/Plan          Patient with a past medical history of breast CA s/p mastectomy, chemo and radiation, DM, HTN, HLD, IgA deficiency, chronic diarrhea, CAD s/p PCI of LAD (1/25), Osteoarthritis, severe cervical central canal stenosis with cord compression and evidence of cord edema at C3-4 and C5-6,s/p C3-C6 laminoplasty on 7/23/21, Hearing loss, Colonoscopy due in 2031, MAmmogram (March)     # Cough resolved after stopping ACE inhibitor  Will still need a repeat CT chest in 3 months to assess  the mediastinal lymph nodes    #Mammogram needed  Bruising on the right breast likely brought on by Brilinta  Counseled that she needs to stay on Brilinta and not stop it under any circumstance  If she feels that it is causing more bruising than the other option would be Plavix but this needs to be first discussed with the cardiologist  Getting mammogram on Thursday     #Coronary artery disease s/p PCI  Continue Brilinta for at least 1 year  Continue statins lifelong and aspirin lifelong    #Hypertension  Stable       #Diabetes mellitus  Will restart metformin daily     #Dyslipidemia with elevated calcium scores  Target LDL less than 70

## 2025-02-24 ENCOUNTER — CLINICAL SUPPORT (OUTPATIENT)
Dept: CARDIAC REHAB | Facility: CLINIC | Age: 70
End: 2025-02-24
Payer: MEDICARE

## 2025-02-24 DIAGNOSIS — I21.3 STEMI (ST ELEVATION MYOCARDIAL INFARCTION) (MULTI): ICD-10-CM

## 2025-02-24 DIAGNOSIS — I21.02 ST ELEVATION MYOCARDIAL INFARCTION INVOLVING LEFT ANTERIOR DESCENDING (LAD) CORONARY ARTERY (MULTI): Primary | ICD-10-CM

## 2025-02-24 PROCEDURE — 93798 PHYS/QHP OP CAR RHAB W/ECG: CPT | Performed by: INTERNAL MEDICINE

## 2025-02-26 ENCOUNTER — CLINICAL SUPPORT (OUTPATIENT)
Dept: CARDIAC REHAB | Facility: CLINIC | Age: 70
End: 2025-02-26
Payer: MEDICARE

## 2025-02-26 DIAGNOSIS — I21.02 ST ELEVATION MYOCARDIAL INFARCTION INVOLVING LEFT ANTERIOR DESCENDING (LAD) CORONARY ARTERY (MULTI): Primary | ICD-10-CM

## 2025-02-26 DIAGNOSIS — I21.3 STEMI (ST ELEVATION MYOCARDIAL INFARCTION) (MULTI): ICD-10-CM

## 2025-02-26 PROCEDURE — 93798 PHYS/QHP OP CAR RHAB W/ECG: CPT | Performed by: INTERNAL MEDICINE

## 2025-02-26 PROCEDURE — RXMED WILLOW AMBULATORY MEDICATION CHARGE

## 2025-02-26 NOTE — PROGRESS NOTES
Cardiac Rehab Education  Marsha Tapia   [id number]    2/26/2025  Education on sodium intake and consequences of high blood pressure was done during today's session. Discussed with patient the risks of excess levels of sodium and how to decrease dietary intake with provided list of substitutions. Handouts were given for home reference.     Signature Sherrill Burns, [facility]-EP

## 2025-02-27 ENCOUNTER — HOSPITAL ENCOUNTER (OUTPATIENT)
Dept: RADIOLOGY | Facility: CLINIC | Age: 70
Discharge: HOME | End: 2025-02-27
Payer: MEDICARE

## 2025-02-27 VITALS — HEIGHT: 65 IN | BODY MASS INDEX: 27.49 KG/M2

## 2025-02-27 DIAGNOSIS — Z12.31 ENCOUNTER FOR SCREENING MAMMOGRAM FOR MALIGNANT NEOPLASM OF BREAST: ICD-10-CM

## 2025-02-27 PROCEDURE — 77067 SCR MAMMO BI INCL CAD: CPT | Performed by: STUDENT IN AN ORGANIZED HEALTH CARE EDUCATION/TRAINING PROGRAM

## 2025-02-27 PROCEDURE — 77063 BREAST TOMOSYNTHESIS BI: CPT | Performed by: STUDENT IN AN ORGANIZED HEALTH CARE EDUCATION/TRAINING PROGRAM

## 2025-02-27 PROCEDURE — 77067 SCR MAMMO BI INCL CAD: CPT

## 2025-02-28 ENCOUNTER — PHARMACY VISIT (OUTPATIENT)
Dept: PHARMACY | Facility: CLINIC | Age: 70
End: 2025-02-28
Payer: COMMERCIAL

## 2025-02-28 ENCOUNTER — CLINICAL SUPPORT (OUTPATIENT)
Dept: CARDIAC REHAB | Facility: CLINIC | Age: 70
End: 2025-02-28
Payer: MEDICARE

## 2025-02-28 DIAGNOSIS — I21.3 STEMI (ST ELEVATION MYOCARDIAL INFARCTION) (MULTI): ICD-10-CM

## 2025-02-28 DIAGNOSIS — I21.02 ST ELEVATION MYOCARDIAL INFARCTION INVOLVING LEFT ANTERIOR DESCENDING (LAD) CORONARY ARTERY (MULTI): Primary | ICD-10-CM

## 2025-02-28 PROCEDURE — 93798 PHYS/QHP OP CAR RHAB W/ECG: CPT

## 2025-03-03 ENCOUNTER — CLINICAL SUPPORT (OUTPATIENT)
Dept: CARDIAC REHAB | Facility: CLINIC | Age: 70
End: 2025-03-03
Payer: MEDICARE

## 2025-03-03 DIAGNOSIS — I21.3 STEMI (ST ELEVATION MYOCARDIAL INFARCTION) (MULTI): ICD-10-CM

## 2025-03-04 ENCOUNTER — CLINICAL SUPPORT (OUTPATIENT)
Dept: CARDIAC REHAB | Facility: CLINIC | Age: 70
End: 2025-03-04
Payer: MEDICARE

## 2025-03-04 DIAGNOSIS — I21.02 ST ELEVATION MYOCARDIAL INFARCTION INVOLVING LEFT ANTERIOR DESCENDING (LAD) CORONARY ARTERY (MULTI): Primary | ICD-10-CM

## 2025-03-04 DIAGNOSIS — I21.3 STEMI (ST ELEVATION MYOCARDIAL INFARCTION) (MULTI): ICD-10-CM

## 2025-03-04 PROCEDURE — 93798 PHYS/QHP OP CAR RHAB W/ECG: CPT | Performed by: INTERNAL MEDICINE

## 2025-03-05 ENCOUNTER — APPOINTMENT (OUTPATIENT)
Dept: CARDIAC REHAB | Facility: CLINIC | Age: 70
End: 2025-03-05
Payer: MEDICARE

## 2025-03-06 ENCOUNTER — APPOINTMENT (OUTPATIENT)
Dept: CARDIAC REHAB | Facility: CLINIC | Age: 70
End: 2025-03-06
Payer: MEDICARE

## 2025-03-07 ENCOUNTER — APPOINTMENT (OUTPATIENT)
Dept: CARDIAC REHAB | Facility: CLINIC | Age: 70
End: 2025-03-07
Payer: MEDICARE

## 2025-03-07 ENCOUNTER — CLINICAL SUPPORT (OUTPATIENT)
Dept: CARDIAC REHAB | Facility: CLINIC | Age: 70
End: 2025-03-07
Payer: MEDICARE

## 2025-03-07 DIAGNOSIS — J01.90 ACUTE NON-RECURRENT SINUSITIS, UNSPECIFIED LOCATION: ICD-10-CM

## 2025-03-07 DIAGNOSIS — I21.02 ST ELEVATION MYOCARDIAL INFARCTION INVOLVING LEFT ANTERIOR DESCENDING (LAD) CORONARY ARTERY (MULTI): Primary | ICD-10-CM

## 2025-03-07 PROCEDURE — 93798 PHYS/QHP OP CAR RHAB W/ECG: CPT | Performed by: INTERNAL MEDICINE

## 2025-03-07 PROCEDURE — RXMED WILLOW AMBULATORY MEDICATION CHARGE

## 2025-03-10 ENCOUNTER — CLINICAL SUPPORT (OUTPATIENT)
Dept: CARDIAC REHAB | Facility: CLINIC | Age: 70
End: 2025-03-10
Payer: MEDICARE

## 2025-03-10 ENCOUNTER — PHARMACY VISIT (OUTPATIENT)
Dept: PHARMACY | Facility: CLINIC | Age: 70
End: 2025-03-10
Payer: COMMERCIAL

## 2025-03-10 DIAGNOSIS — I21.3 STEMI (ST ELEVATION MYOCARDIAL INFARCTION) (MULTI): ICD-10-CM

## 2025-03-10 DIAGNOSIS — I21.02 ST ELEVATION MYOCARDIAL INFARCTION INVOLVING LEFT ANTERIOR DESCENDING (LAD) CORONARY ARTERY (MULTI): Primary | ICD-10-CM

## 2025-03-10 PROCEDURE — 93798 PHYS/QHP OP CAR RHAB W/ECG: CPT | Performed by: INTERNAL MEDICINE

## 2025-03-12 ENCOUNTER — CLINICAL SUPPORT (OUTPATIENT)
Dept: CARDIAC REHAB | Facility: CLINIC | Age: 70
End: 2025-03-12
Payer: MEDICARE

## 2025-03-12 DIAGNOSIS — I21.02 ST ELEVATION MYOCARDIAL INFARCTION INVOLVING LEFT ANTERIOR DESCENDING (LAD) CORONARY ARTERY (MULTI): ICD-10-CM

## 2025-03-12 DIAGNOSIS — I21.3 STEMI (ST ELEVATION MYOCARDIAL INFARCTION) (MULTI): ICD-10-CM

## 2025-03-12 PROCEDURE — 93798 PHYS/QHP OP CAR RHAB W/ECG: CPT | Performed by: INTERNAL MEDICINE

## 2025-03-12 RX ORDER — BENZONATATE 100 MG/1
100 CAPSULE ORAL 3 TIMES DAILY PRN
Qty: 42 CAPSULE | Refills: 0 | Status: SHIPPED | OUTPATIENT
Start: 2025-03-12 | End: 2025-04-11

## 2025-03-12 NOTE — PROGRESS NOTES
Cardiac Rehab Education  Marsha Tapia   [id number]    3/12/2025  Exercise prescription and maintenance education was done during today's session. Discussed FITT principle, reviewed perceived exertion scale, and patient's THR was given for home practice with instructions on how to obtain. Handouts were given for personal reference. Home exercise prescription is to be given prior to discharge from program.      Patricia De Leon RN

## 2025-03-14 ENCOUNTER — CLINICAL SUPPORT (OUTPATIENT)
Dept: CARDIAC REHAB | Facility: CLINIC | Age: 70
End: 2025-03-14
Payer: MEDICARE

## 2025-03-14 DIAGNOSIS — I21.3 STEMI (ST ELEVATION MYOCARDIAL INFARCTION) (MULTI): ICD-10-CM

## 2025-03-17 ENCOUNTER — APPOINTMENT (OUTPATIENT)
Dept: CARDIAC REHAB | Facility: CLINIC | Age: 70
End: 2025-03-17
Payer: MEDICARE

## 2025-03-18 ENCOUNTER — CLINICAL SUPPORT (OUTPATIENT)
Dept: CARDIAC REHAB | Facility: CLINIC | Age: 70
End: 2025-03-18
Payer: MEDICARE

## 2025-03-18 DIAGNOSIS — I21.3 STEMI (ST ELEVATION MYOCARDIAL INFARCTION) (MULTI): ICD-10-CM

## 2025-03-18 DIAGNOSIS — I21.02 ST ELEVATION MYOCARDIAL INFARCTION INVOLVING LEFT ANTERIOR DESCENDING (LAD) CORONARY ARTERY (MULTI): ICD-10-CM

## 2025-03-18 PROCEDURE — 93798 PHYS/QHP OP CAR RHAB W/ECG: CPT | Performed by: INTERNAL MEDICINE

## 2025-03-18 NOTE — PROGRESS NOTES
Cardiac Rehab Education  Marsha Tapia   [id number]    3/18/2025  Coronary artery disease education was done during today's session. Signs and symptoms of possible heart attack, risk factors for prevention, and lifestyle behavior modifications were all explained. Handout for personal use was given.      Signature Cari De Leon RN

## 2025-03-19 ENCOUNTER — CLINICAL SUPPORT (OUTPATIENT)
Dept: CARDIAC REHAB | Facility: CLINIC | Age: 70
End: 2025-03-19
Payer: MEDICARE

## 2025-03-19 ENCOUNTER — DOCUMENTATION (OUTPATIENT)
Dept: CARDIAC REHAB | Facility: CLINIC | Age: 70
End: 2025-03-19

## 2025-03-19 VITALS — SYSTOLIC BLOOD PRESSURE: 98 MMHG | DIASTOLIC BLOOD PRESSURE: 58 MMHG | HEART RATE: 64 BPM

## 2025-03-19 DIAGNOSIS — I21.02 ST ELEVATION MYOCARDIAL INFARCTION INVOLVING LEFT ANTERIOR DESCENDING (LAD) CORONARY ARTERY (MULTI): Primary | ICD-10-CM

## 2025-03-19 DIAGNOSIS — I21.3 STEMI (ST ELEVATION MYOCARDIAL INFARCTION) (MULTI): ICD-10-CM

## 2025-03-19 PROCEDURE — 93798 PHYS/QHP OP CAR RHAB W/ECG: CPT | Performed by: INTERNAL MEDICINE

## 2025-03-19 NOTE — PROGRESS NOTES
Cardiac Rehabilitation 60 Day Reassessment    Name: Marsha Tapia  Medical Record Number: [id number]  YOB: 1955  Age: 69 y.o.    Today’s Date: 3/19/2025  Primary Care Physician: Stephane Sandhu MD  Referring Physician: Srinivas Ramirez MD  Program Location: [address]     General  Primary Diagnosis:   1. STEMI (ST elevation myocardial infarction) (Multi)             Onset/Date of Diagnosis: 1/12/2025    Session #: 19    AACVPR Risk Stratification: High    Falls Risk: High  Psychosocial Assessment   Pre PHQ-9: 6   Sent PH-Q 9 to MD if score > 20: No; score < 20    Pt reported/currently experiencing stress: No  Patient uses stress management skills: No   History of: depression  Currently seeing a mental health provider: No  Social Support: Yes, Whom:family  friend  Quality of Life Survey: SF-36   SF-36 Pre Post   Physical Component Score TBD TBD   Mental Component Score TBd TBD     Learning Assessment:  Learning assessment/barriers: None  Preferred learning method: Auditory and Visual  Barriers: None  Comments:    Stages of Change:Action    Psychosocial Plan    Goal Status: In progress    Psychosocial Goals: Demonstrating proper techniques for stress management, Maintain or lower PH-Q 9 score by discharge, and Identify strategies for managing depression    Psychosocial Interventions/Education:   2/20/2025 Patient has been efficiently managing stress.  319/2025  Patient has been efficiently managing stress and has reportedly resumed job.    Nutrition Assessment:    Hyperlipidemia: Yes     Lipids:   Lab Results   Component Value Date    CHOL 160 01/12/2025    HDL 42.3 01/12/2025    LDLF 120 (H) 11/09/2022    TRIG 67 01/12/2025       Current Dietary Guidelines: Low fat, Low sodium  Barriers to dietary change: no    Diet Habit Survey: Picture Your Plate  Pre:  44  Post: To be done at discharge.    Diabetes Assessment    Lab Results   Component Value Date    HGBA1C 6.8 (A) 01/24/2025       History  of Diabetes: No    Weight Management       No data recorded    Nutrition Plan    Goal Status: In progress    Nutrition Goals: Lipid Goal: HDL>45, LDL <70, Total <180, Trigs <150, Improve Diet Habit Survey score by 5-10 points by discharge, Adapt a low-sodium, DASH diet prior to discharge, Adapt a Mediterranean focused diet prior to discharge, Learn how to read and interpret nutrition labels prior to discharge, and Lose 1lb/week while enrolled in program    Nutrition Interventions/Education:   2/5/2025  Label reading education was done with program's RDN during today's visit. Label Reading Highlights handout was given with information discussed during appointment and to assist in review of label contents at home.    2/10/2025 Diet Habit Survey results reviewed with pt. Dietitian's comments and recommendations given to Pt for review.    Exercise Assessment    No  Mode: NA  Frequency: NA  Duration: NA    Exercise Prescription     Exercise Prescription based on: Duke Activity Status Index (DASI)    DASI Score: 10.7  MET Score: 4.1   Frequency:  3 days/week   Mode: Treadmill, NuStep, and Recumbent Cycle   Duration:  42 total aerobic minutes   Intensity: RPE 12-16  Target HR:     MET Level: 2.9  Patient wears supplemental O2: No     Modality Workload METs Duration (minutes)   1 Pre-Exercise      2 Treadmill 2.2 mph @ 2 % 3.3 14 :00   3 NuStep 6@64 burt  2.9 14 :00   4 Recumbent Bike 5@55RPM  2.4 14 :00   5 Cooldown    5 :00   6 Post-Exercise        Resistance Training: No   Home Exercise Prescription given: Yes    Exercise Plan    Goal Status: In progress    Exercise Goals: Increase exercise MET level by 5-10% each week, Increase total exercise duration to 30-45 minutes, Obtain 150 minutes/week of moderate intensity aerobic exercise, and Establish a home exercise program before discharge    Exercise Interventions/Education:   2/12/2025 Pre-rehab body composition was given to the patient in today's session. Patient  was given home exercise prescription and 2 week exercise log. Patient reports that she has stationary bike at home and plans to exercise using that at home.  2025  Resistance training education was done. Resistance bands were measured and cut for patient to begin home strength training exercises. Frequency, intensity/RPE, and form were reviewed with educational handout given for further reference. Patient was encouraged to comeback with any questions.    3/3/2025 Reviewed ExRx with patient. Based on HR/BP/ RPD/ RPE, time was increased by 1 minute(s) on each modality for a total of 39 minutes each exercise session. Patient rating RPE/RPD appropriately.   3/12/2025  Exercise prescription and maintenance education was done during today's session. Discussed FITT principle, reviewed perceived exertion scale, and patient's THR was given for home practice with instructions on how to obtain. Handouts were given for personal reference. Home exercise prescription is to be given prior to discharge from program.      Other Core Components/Risk Factor Assessment:    Medication adherence  Current Medications:   Medication Documentation Review Audit       Reviewed by Srinivas Perez MD (Physician) on 25 at 1007      Medication Order Taking? Sig Documenting Provider Last Dose Status   amoxicillin-pot clavulanate (Augmentin) 875-125 mg tablet [id number]  Take 1 tablet by mouth 2 times a day for 7 days. Stephane Sandhu MD   25 2359   aspirin 81 mg EC tablet [id number] Yes Take 1 tablet (81 mg) by mouth once daily. XOCHITL Garza-CNP  Active   atorvastatin (Lipitor) 80 mg tablet [id number] Yes Take 1 tablet (80 mg) by mouth once daily. XOCHITL Garza-CNP  Active   benzonatate (Tessalon) 100 mg capsule [id number] Yes Take 1 capsule (100 mg) by mouth 3 times a day as needed for cough. Do not crush or chew. Stephane Sandhu MD  Active   docusate sodium (Colace) 100 mg capsule [id number] Yes Take  "1 capsule (100 mg) by mouth twice a day. Historical Provider, MD  Active   fluticasone (Flonase) 50 mcg/actuation nasal spray [id number] Yes Administer 2 sprays into affected nostril(s) once daily. Historical Provider, MD  Active   foam bandage (Allevyn) 2 X 2 \" bandage [id number] Yes Apply to right axilla as directed John Aguila MD  Active   metFORMIN (Glucophage) 500 mg tablet [id number] Yes Take 1 tablet (500 mg) by mouth 2 times daily (morning and late afternoon). Stephane Sandhu MD  Active   nitroglycerin (Nitrostat) 0.4 mg SL tablet [id number] Yes Place 1 tablet (0.4 mg) under the tongue every 5 minutes if needed for chest pain. Stehpane Sandhu MD  Active   ticagrelor (Brilinta) 90 mg tablet [id number] Yes Take 1 tablet (90 mg) by mouth 2 times a day. Stephane Sandhu MD  Active   valsartan (Diovan) 160 mg tablet [id number] Yes Take 1 tablet (160 mg) by mouth once daily. Stephane Sandhu MD  Active                                 Medication compliance: Yes   Uses pill box/organizer: No    Carries medication list: No     Blood Pressure Management  History of Hypertension: Yes   Medication Changes: No   Resting BP:  Visit Vitals  BP 98/58 (Patient Position: Sitting) Comment: Post-exercise   Pulse 64            Heart Failure Management  Hx of Heart Failure: No    Smoking/Tobacco Assessment  Social History     Tobacco Use   Smoking Status Never   Smokeless Tobacco Not on file       Other Core Component Plan    Goal Status: In progress    Other Core Component Goals: Verbalize medication usage and drug actions by discharge, Verbalize SL NTG action and proper dosage by discharge, and Achieve resting BP of < 130/80 by discharge    Other Core Component Interventions/Education:   1/29/2025  Risk factors of cardiovascular disease were discussed today's. Education on modifiable and non-modifiable risk factors of CVD and recommendations to manage them was done today. Handouts were provided along with further reference. Patient " was educated to return with further questions.  2/19/2025  Cardiovascular changes with exercise were discussed in today's session. Both immediate and long term effects of exercise were covered and the importance of cooling down post workout was reviewed.   2/26/2025  Education on sodium intake and consequences of high blood pressure was done during today's session. Discussed with patient the risks of excess levels of sodium and how to decrease dietary intake with provided list of substitutions. Handouts were given for home reference.   3/18/2025  Coronary artery disease education was done during today's session. Signs and symptoms of possible heart attack, risk factors for prevention, and lifestyle behavior modifications were all explained. Handout for personal use was given.      Individual Patient Goals:    Increase strength and endurance to help with balance by d/c  Learn healthy eating habits by education by midpoint     Goal Status: In progress    Staff Comments:  Marsha has completed 19 sessions of Cardiac Rehab. Patient  has been consistent with attendance. Patient has increased duration on Treadmill, NuStep, and Recumbent Cycle modalities.  No cardiac complaints have been voiced and no ectopy has been noted on ECG. BP have been WNL. Patient was given pre rehab body composition and PYP reviewed from dietician. Patient attended education lecture on exercise prescription and maintenance, sodium intake and blood pressure management and coronary artery disease. Patient also reported to have resumed job.    Rehab Staff Signature: Sherrill Burns [facility]-EP   well developed

## 2025-03-19 NOTE — PROGRESS NOTES
Cardiac Rehab Education  Marsha Tapia   [id number]    3/19/2025  Coronary artery disease education was done during today's session. Signs and symptoms of possible heart attack, risk factors for prevention, and lifestyle behavior modifications were all explained. Handout for personal use was given.      Signature Sherrill Burns, [facility]-EP

## 2025-03-21 ENCOUNTER — CLINICAL SUPPORT (OUTPATIENT)
Dept: CARDIAC REHAB | Facility: CLINIC | Age: 70
End: 2025-03-21
Payer: MEDICARE

## 2025-03-21 DIAGNOSIS — I21.3 STEMI (ST ELEVATION MYOCARDIAL INFARCTION) (MULTI): ICD-10-CM

## 2025-03-21 DIAGNOSIS — I21.02 ST ELEVATION MYOCARDIAL INFARCTION INVOLVING LEFT ANTERIOR DESCENDING (LAD) CORONARY ARTERY (MULTI): Primary | ICD-10-CM

## 2025-03-21 PROCEDURE — 93798 PHYS/QHP OP CAR RHAB W/ECG: CPT | Performed by: INTERNAL MEDICINE

## 2025-03-24 ENCOUNTER — TELEPHONE (OUTPATIENT)
Dept: PRIMARY CARE | Facility: CLINIC | Age: 70
End: 2025-03-24

## 2025-03-24 ENCOUNTER — APPOINTMENT (OUTPATIENT)
Dept: CARDIAC REHAB | Facility: CLINIC | Age: 70
End: 2025-03-24
Payer: MEDICARE

## 2025-03-24 NOTE — TELEPHONE ENCOUNTER
Patient is asking for a refill on her amlodipine benazepril 10/40 once daily I didn't see it on her med list I went back in her notes in 01/15/2025 I see where it was stopped

## 2025-03-26 ENCOUNTER — APPOINTMENT (OUTPATIENT)
Dept: CARDIAC REHAB | Facility: CLINIC | Age: 70
End: 2025-03-26
Payer: MEDICARE

## 2025-03-26 ENCOUNTER — DOCUMENTATION (OUTPATIENT)
Dept: CARDIAC REHAB | Facility: CLINIC | Age: 70
End: 2025-03-26
Payer: MEDICARE

## 2025-03-26 VITALS — DIASTOLIC BLOOD PRESSURE: 60 MMHG | HEART RATE: 70 BPM | SYSTOLIC BLOOD PRESSURE: 108 MMHG

## 2025-03-26 NOTE — PROGRESS NOTES
Cardiac Rehabilitation Discharge Summary    Name: Marsha Tapia  Medical Record Number: [id number]  YOB: 1955  Age: 69 y.o.    Today’s Date: 3/26/2025  Primary Care Physician: Stephane Sandhu MD  Referring Physician: Srinivas Ramirez MD  Program Location: [address]  Primary Diagnosis:   1. STEMI (ST elevation myocardial infarction) (Multi)             Onset/Date of Diagnosis: 1/12/2025    Session #: 20    AACVPR Risk Stratification: High    Falls Risk: High  Psychosocial Assessment   Pre PHQ-9: 6   Sent PH-Q 9 to MD if score > 20: No; score < 20  Post PHQ-9: Unable to assess; patient dropped out    Pt reported/currently experiencing stress: No  Patient uses stress management skills: No   History of: depression  Currently seeing a mental health provider: No  Social Support: Yes, Whom:family  friend  Quality of Life Survey: SF-36   SF-36 Pre Post   Physical Component Score TBD TBD   Mental Component Score TBd TBD     Learning Assessment:  Learning assessment/barriers: None  Preferred learning method: Auditory and Visual  Barriers: None  Comments:    Stages of Change:Maintenance    Psychosocial Plan    Goal Status: In progress    Psychosocial Goals: Demonstrating proper techniques for stress management, Maintain or lower PH-Q 9 score by discharge, and Identify strategies for managing depression    Psychosocial Interventions/Education:   2/20/2025 Patient has been efficiently managing stress.  319/2025  Patient has been efficiently managing stress and has reportedly resumed job.  3/26/2025 Unable to assess goal achievement; see the staff comments below.    Nutrition Assessment:    Hyperlipidemia: Yes     Lipids:   Lab Results   Component Value Date    CHOL 160 01/12/2025    HDL 42.3 01/12/2025    LDLF 120 (H) 11/09/2022    TRIG 67 01/12/2025       Current Dietary Guidelines: Low fat, Low sodium  Barriers to dietary change: no    Diet Habit Survey: Picture Your Plate  Pre:  44  Post:  Unable to reassess; patient dropped out    Diabetes Assessment    Lab Results   Component Value Date    HGBA1C 6.8 (A) 01/24/2025       History of Diabetes: No    Weight Management       No data recorded    Nutrition Plan    Goal Status: In progress    Nutrition Goals: Lipid Goal: HDL>45, LDL <70, Total <180, Trigs <150, Improve Diet Habit Survey score by 5-10 points by discharge, Adapt a low-sodium, DASH diet prior to discharge, Adapt a Mediterranean focused diet prior to discharge, Learn how to read and interpret nutrition labels prior to discharge, and Lose 1lb/week while enrolled in program    Nutrition Interventions/Education:   2/5/2025  Label reading education was done with program's RDN during today's visit. Label Reading Highlights handout was given with information discussed during appointment and to assist in review of label contents at home.    2/10/2025 Diet Habit Survey results reviewed with pt. Dietitian's comments and recommendations given to Pt for review.  3/26/2025 Unable to assess goal achievement; see the staff comments below.    Exercise Assessment    No  Mode: NA  Frequency: NA  Duration: NA    Exercise Prescription     Exercise Prescription based on: Duke Activity Status Index (DASI)    DASI Score: 10.7  MET Score: 4.1   Frequency:  3 days/week   Mode: Treadmill, NuStep, and Recumbent Cycle   Duration:  42 total aerobic minutes   Intensity: RPE 12-16  Target HR:     MET Level: 2.9  Patient wears supplemental O2: No     Modality Workload METs Duration (minutes)   1 Pre-Exercise      2 Treadmill 2.2 mph @ 2 % 3.3 14 :00   3 NuStep 6@64 burt  2.9 14 :00   4 Recumbent Bike 5@55RPM  2.4 14 :00   5 Cooldown    5 :00   6 Post-Exercise        Resistance Training: No   Home Exercise Prescription given: Yes    Exercise Plan    Goal Status: In progress    Exercise Goals: Increase exercise MET level by 5-10% each week, Increase total exercise duration to 30-45 minutes, Obtain 150 minutes/week of  moderate intensity aerobic exercise, and Establish a home exercise program before discharge    Exercise Interventions/Education:   2025 Pre-rehab body composition was given to the patient in today's session. Patient was given home exercise prescription and 2 week exercise log. Patient reports that she has stationary bike at home and plans to exercise using that at home.  2025  Resistance training education was done. Resistance bands were measured and cut for patient to begin home strength training exercises. Frequency, intensity/RPE, and form were reviewed with educational handout given for further reference. Patient was encouraged to comeback with any questions.    3/3/2025 Reviewed ExRx with patient. Based on HR/BP/ RPD/ RPE, time was increased by 1 minute(s) on each modality for a total of 39 minutes each exercise session. Patient rating RPE/RPD appropriately.   3/12/2025  Exercise prescription and maintenance education was done during today's session. Discussed FITT principle, reviewed perceived exertion scale, and patient's THR was given for home practice with instructions on how to obtain. Handouts were given for personal reference. Home exercise prescription is to be given prior to discharge from program.    3/26/2025 Unable to assess goal achievement; see the staff comments below.    Other Core Components/Risk Factor Assessment:    Medication adherence  Current Medications:   Medication Documentation Review Audit       Reviewed by Srinivas Perez MD (Physician) on 25 at 1007      Medication Order Taking? Sig Documenting Provider Last Dose Status   amoxicillin-pot clavulanate (Augmentin) 875-125 mg tablet [id number]  Take 1 tablet by mouth 2 times a day for 7 days. Stephane Sandhu MD   25 6869   aspirin 81 mg EC tablet [id number] Yes Take 1 tablet (81 mg) by mouth once daily. Frantz Nathan, APRN-CNP  Active   atorvastatin (Lipitor) 80 mg tablet [id number] Yes Take 1 tablet  "(80 mg) by mouth once daily. Frantz Nathan, APRN-CNP  Active   benzonatate (Tessalon) 100 mg capsule [id number] Yes Take 1 capsule (100 mg) by mouth 3 times a day as needed for cough. Do not crush or chew. Stephane Sandhu MD  Active   docusate sodium (Colace) 100 mg capsule [id number] Yes Take 1 capsule (100 mg) by mouth twice a day. Historical Provider, MD  Active   fluticasone (Flonase) 50 mcg/actuation nasal spray [id number] Yes Administer 2 sprays into affected nostril(s) once daily. Historical Provider, MD  Active   foam bandage (Allevyn) 2 X 2 \" bandage [id number] Yes Apply to right axilla as directed John Aguila MD  Active   metFORMIN (Glucophage) 500 mg tablet [id number] Yes Take 1 tablet (500 mg) by mouth 2 times daily (morning and late afternoon). Stephane Sandhu MD  Active   nitroglycerin (Nitrostat) 0.4 mg SL tablet [id number] Yes Place 1 tablet (0.4 mg) under the tongue every 5 minutes if needed for chest pain. Stephane Sandhu MD  Active   ticagrelor (Brilinta) 90 mg tablet [id number] Yes Take 1 tablet (90 mg) by mouth 2 times a day. Stephane Sandhu MD  Active   valsartan (Diovan) 160 mg tablet [id number] Yes Take 1 tablet (160 mg) by mouth once daily. Stephane Sandhu MD  Active                                 Medication compliance: Yes   Uses pill box/organizer: No    Carries medication list: No     Blood Pressure Management  History of Hypertension: Yes   Medication Changes: No   Resting BP:  Visit Vitals  /60 (Patient Position: Sitting) Comment: Post-exercise   Pulse 70              Heart Failure Management  Hx of Heart Failure: No    Smoking/Tobacco Assessment  Social History     Tobacco Use   Smoking Status Never   Smokeless Tobacco Not on file       Other Core Component Plan    Goal Status: In progress    Other Core Component Goals: Verbalize medication usage and drug actions by discharge, Verbalize SL NTG action and proper dosage by discharge, and Achieve resting BP of < 130/80 by " discharge    Other Core Component Interventions/Education:   1/29/2025  Risk factors of cardiovascular disease were discussed today's. Education on modifiable and non-modifiable risk factors of CVD and recommendations to manage them was done today. Handouts were provided along with further reference. Patient was educated to return with further questions.  2/19/2025  Cardiovascular changes with exercise were discussed in today's session. Both immediate and long term effects of exercise were covered and the importance of cooling down post workout was reviewed.   2/26/2025  Education on sodium intake and consequences of high blood pressure was done during today's session. Discussed with patient the risks of excess levels of sodium and how to decrease dietary intake with provided list of substitutions. Handouts were given for home reference.   3/18/2025  Coronary artery disease education was done during today's session. Signs and symptoms of possible heart attack, risk factors for prevention, and lifestyle behavior modifications were all explained. Handout for personal use was given.    3/26/2025 Unable to assess goal achievement; see the staff comments below.    Individual Patient Goals:    Increase strength and endurance to help with balance by d/c  Learn healthy eating habits by education by midpoint     Goal Status: In progress    Staff Comments:  Marsha has completed 20 sessions of Cardiac Rehab, and is discharged from the program. Patient  has been consistent with attendance. Patient has increased duration by 40 % and intensity by 43 % by on Treadmill, NuStep, and Recumbent Cycle modalities.  No cardiac complaints have been voiced and no ectopy has been noted on ECG. BP have been WNL. Patient left a voicemail on 03/24/25 stating that she has resumed back her job and cannot adjust the schedule to attend the rehab and wants to drop out. Rehab staff tried to reach out to patient and left voicemail to contact the  patient but Ms. Tapia did not called back. Thank you for your referral.    Rehab Staff Signature: Sherrill [facility]

## 2025-03-28 ENCOUNTER — APPOINTMENT (OUTPATIENT)
Dept: CARDIAC REHAB | Facility: CLINIC | Age: 70
End: 2025-03-28
Payer: MEDICARE

## 2025-03-31 ENCOUNTER — APPOINTMENT (OUTPATIENT)
Dept: CARDIAC REHAB | Facility: CLINIC | Age: 70
End: 2025-03-31
Payer: MEDICARE

## 2025-04-02 ENCOUNTER — APPOINTMENT (OUTPATIENT)
Dept: CARDIAC REHAB | Facility: CLINIC | Age: 70
End: 2025-04-02
Payer: MEDICARE

## 2025-04-02 DIAGNOSIS — I10 BENIGN ESSENTIAL HTN: ICD-10-CM

## 2025-04-02 RX ORDER — VALSARTAN 160 MG/1
160 TABLET ORAL DAILY
Qty: 100 TABLET | Refills: 3 | Status: SHIPPED | OUTPATIENT
Start: 2025-04-02 | End: 2026-05-07

## 2025-04-02 NOTE — TELEPHONE ENCOUNTER
Pt was just a little confused about if she should take amlodipine or valsartan. Pt aware that she is no longer taking amlodipine and now taking valsartan. Refill request sent to Dr. Sandhu.

## 2025-04-04 ENCOUNTER — APPOINTMENT (OUTPATIENT)
Dept: CARDIAC REHAB | Facility: CLINIC | Age: 70
End: 2025-04-04
Payer: MEDICARE

## 2025-04-07 ENCOUNTER — APPOINTMENT (OUTPATIENT)
Dept: CARDIAC REHAB | Facility: CLINIC | Age: 70
End: 2025-04-07
Payer: MEDICARE

## 2025-04-07 PROCEDURE — RXMED WILLOW AMBULATORY MEDICATION CHARGE

## 2025-04-08 ENCOUNTER — PHARMACY VISIT (OUTPATIENT)
Dept: PHARMACY | Facility: CLINIC | Age: 70
End: 2025-04-08
Payer: COMMERCIAL

## 2025-04-09 ENCOUNTER — APPOINTMENT (OUTPATIENT)
Dept: CARDIAC REHAB | Facility: CLINIC | Age: 70
End: 2025-04-09
Payer: MEDICARE

## 2025-04-11 ENCOUNTER — APPOINTMENT (OUTPATIENT)
Dept: CARDIAC REHAB | Facility: CLINIC | Age: 70
End: 2025-04-11
Payer: MEDICARE

## 2025-04-14 ENCOUNTER — APPOINTMENT (OUTPATIENT)
Dept: CARDIAC REHAB | Facility: CLINIC | Age: 70
End: 2025-04-14
Payer: MEDICARE

## 2025-04-14 ENCOUNTER — PATIENT OUTREACH (OUTPATIENT)
Dept: PRIMARY CARE | Facility: CLINIC | Age: 70
End: 2025-04-14
Payer: MEDICARE

## 2025-04-16 ENCOUNTER — APPOINTMENT (OUTPATIENT)
Dept: CARDIAC REHAB | Facility: CLINIC | Age: 70
End: 2025-04-16
Payer: MEDICARE

## 2025-04-18 ENCOUNTER — APPOINTMENT (OUTPATIENT)
Dept: CARDIAC REHAB | Facility: CLINIC | Age: 70
End: 2025-04-18
Payer: MEDICARE

## 2025-04-21 ENCOUNTER — APPOINTMENT (OUTPATIENT)
Dept: CARDIAC REHAB | Facility: CLINIC | Age: 70
End: 2025-04-21
Payer: MEDICARE

## 2025-04-28 ENCOUNTER — APPOINTMENT (OUTPATIENT)
Dept: PRIMARY CARE | Facility: CLINIC | Age: 70
End: 2025-04-28
Payer: MEDICARE

## 2025-05-03 DIAGNOSIS — I35.0 AORTIC VALVE STENOSIS, ETIOLOGY OF CARDIAC VALVE DISEASE UNSPECIFIED: ICD-10-CM

## 2025-05-03 DIAGNOSIS — I21.3 ST ELEVATION MYOCARDIAL INFARCTION (STEMI), UNSPECIFIED ARTERY (MULTI): ICD-10-CM

## 2025-05-03 DIAGNOSIS — E11.9 TYPE 2 DIABETES MELLITUS WITHOUT COMPLICATION, WITHOUT LONG-TERM CURRENT USE OF INSULIN: ICD-10-CM

## 2025-05-03 DIAGNOSIS — Z95.820 S/P ANGIOPLASTY WITH STENT: ICD-10-CM

## 2025-05-05 ENCOUNTER — APPOINTMENT (OUTPATIENT)
Dept: CARDIOLOGY | Facility: HOSPITAL | Age: 70
End: 2025-05-05
Payer: MEDICARE

## 2025-05-19 ENCOUNTER — APPOINTMENT (OUTPATIENT)
Dept: CARDIOLOGY | Facility: HOSPITAL | Age: 70
End: 2025-05-19
Payer: MEDICARE

## 2025-05-19 ENCOUNTER — TELEPHONE (OUTPATIENT)
Dept: CARDIOLOGY | Facility: CLINIC | Age: 70
End: 2025-05-19

## 2025-05-19 VITALS
BODY MASS INDEX: 27.14 KG/M2 | HEIGHT: 64 IN | SYSTOLIC BLOOD PRESSURE: 162 MMHG | HEART RATE: 79 BPM | OXYGEN SATURATION: 100 % | WEIGHT: 159 LBS | DIASTOLIC BLOOD PRESSURE: 70 MMHG

## 2025-05-19 DIAGNOSIS — J01.90 ACUTE NON-RECURRENT SINUSITIS, UNSPECIFIED LOCATION: ICD-10-CM

## 2025-05-19 DIAGNOSIS — I35.0 AORTIC VALVE STENOSIS, ETIOLOGY OF CARDIAC VALVE DISEASE UNSPECIFIED: ICD-10-CM

## 2025-05-19 DIAGNOSIS — E11.9 TYPE 2 DIABETES MELLITUS WITHOUT COMPLICATION, WITHOUT LONG-TERM CURRENT USE OF INSULIN: ICD-10-CM

## 2025-05-19 DIAGNOSIS — I21.3 STEMI (ST ELEVATION MYOCARDIAL INFARCTION) (MULTI): ICD-10-CM

## 2025-05-19 DIAGNOSIS — I35.9 NONRHEUMATIC AORTIC VALVE DISORDER, UNSPECIFIED: ICD-10-CM

## 2025-05-19 DIAGNOSIS — Z95.820 S/P ANGIOPLASTY WITH STENT: Primary | ICD-10-CM

## 2025-05-19 PROCEDURE — 3049F LDL-C 100-129 MG/DL: CPT

## 2025-05-19 PROCEDURE — G2211 COMPLEX E/M VISIT ADD ON: HCPCS

## 2025-05-19 PROCEDURE — 1160F RVW MEDS BY RX/DR IN RCRD: CPT

## 2025-05-19 PROCEDURE — 4010F ACE/ARB THERAPY RXD/TAKEN: CPT

## 2025-05-19 PROCEDURE — 3078F DIAST BP <80 MM HG: CPT

## 2025-05-19 PROCEDURE — 1159F MED LIST DOCD IN RCRD: CPT

## 2025-05-19 PROCEDURE — 3008F BODY MASS INDEX DOCD: CPT

## 2025-05-19 PROCEDURE — 3077F SYST BP >= 140 MM HG: CPT

## 2025-05-19 PROCEDURE — 3044F HG A1C LEVEL LT 7.0%: CPT

## 2025-05-19 PROCEDURE — 99214 OFFICE O/P EST MOD 30 MIN: CPT

## 2025-05-19 PROCEDURE — 99212 OFFICE O/P EST SF 10 MIN: CPT

## 2025-05-19 RX ORDER — BENZONATATE 100 MG/1
100 CAPSULE ORAL 3 TIMES DAILY PRN
Qty: 42 CAPSULE | Refills: 0 | Status: SHIPPED | OUTPATIENT
Start: 2025-05-19 | End: 2025-06-18

## 2025-05-19 NOTE — PROGRESS NOTES
Location of visit: [facility]   Type of Visit: Established - Last Seen: 2/3/25    Chief Complaint:  Patient was referred to Cardiology for post STEMI care    History Of Present Illness:    Marsha Tapia is a 69 y.o. female, with history significant for HTN, HLD, asymptomatic sinus bradycardia, T2DM, breast CA s/p mastectomy/chemo/radiation, IgA deficiency, aortic stenosis, OA, GERD, and hx of gastric bypass and STEMI  who visits Cardiology today as a follow up visit  for STEMI.     69 y.o. female, with a PMH of HTN, HLD, asymptomatic sinus bradycardia, T2DM, breast CA s/p mastectomy/chemo/radiation, IgA deficiency, aortic stenosis, OA, GERD, and hx of gastric bypass, who presented to [facility] on 1/12/2025 for chest pain. Patient reports she was initially seen in urgent care on 1/9 for URI symptoms- nasal congestion, cough, rhinorrhea, headache, and myalgias. She was given PO abx and sent home. Her BP was noted to be high during that visit, 180/72, but improved prior to her leaving. She continued to feel worse with increase chest pain, n/v, and fatigue over the past few days that she associated to the illness. This morning, she woke up with worsening chest pain, SOB, and vomiting so she called 911.   ED course notable for  ST elevation in anterior leads, STEMI alert activated she was given 324 ASA, 180 Brilinta, ntg, morphine and taken to emergently to the cath lab.   LHC revealed 100% thrombotic occlusion to the mid-LAD s/p aspiration thrombectomy and PCI with Synergy 2.5x 32 LIAM. Patient was admitted to the ICU post-catheterization. Cardiology was consulted for post STEMI care/recommendations.     Echocardiogram showed: Left ventricular ejection fraction is normal, calculated by Dorado's biplane at 60%. Abnormal left venticular wall motion. Entire septum is abnormal.  Spectral Doppler shows an abnormal pattern of left ventricular diastolic filling with an elevated left atrial pressure. There is  normal right ventricular global systolic function. The left atrium is enlarged. Mild to moderate aortic valve stenosis.    The plan was> -ASA/Ticagrelor for 1 year without interruption followed by lifelong ASA -Continue increased Atorvastatin  80mg daily -Troponin peaked at >125,000 and downtrended  -Metoprolol held due to bradycardia.  -Continue Lisinopril 40 mg daily  -Echo showed normal LV systolic function with wall motion abnormalities   Currently on Lisinopril 40 mg daily. Holding home amlodipine  DM- Optimize glycemic control per primary. HbgA1C 7.3  ======================================    The patient states she has been feeling good, doing her cardio rehab with no problem, using her medication with no complication, and doing her activities with no issues.  She states she has been feeling good in relation to the kind and amount of that she can do.  She denies chest pain, dyspnea on exertion, shortness of breath, orthopnea, PND, nocturia, edema, palpitations, dizziness, lightheadedness or syncope  Blood pressure today: 136/71 mmHg    ======================================    She states she has been feeling fine, no chest pain, no chest pressure, no palpitations, no shortness of breath.  She completed her rehabilitation with no issues.  Now she states she is having a cold in the last 2 days.  She has not been checking her blood pressure at home      Past Medical History:  She has a past medical history of Atypical ductal hyperplasia, breast (2016), BRCA1 gene mutation positive (2016), BRCA2 gene mutation positive (2016), Breast cancer (2016), Breast cyst (2007), Breast injury (2016), Endometrial cancer (Multi) (2016), Fibrocystic breast (2016), antineoplastic chemo, Lobular carcinoma in situ (2016), Localized enlarged lymph nodes (06/22/2021), Personal history of irradiation, Personal history of malignant neoplasm of breast, and Usual hyperplasia of lactiferous duct (2016).    Past Surgical History:  She has  "a past surgical history that includes Other surgical history (10/12/2020); Other surgical history (10/12/2020); Other surgical history (10/12/2020); Other surgical history (10/12/2020); Other surgical history (10/28/2022); Other surgical history (09/30/2021); MR angio head wo IV contrast (07/20/2021); MR angio neck wo IV contrast (07/20/2021); Breast lumpectomy (2016); Cardiac catheterization (N/A, 01/12/2025); Cardiac catheterization (N/A, 01/12/2025); Breast biopsy (2016); Breast cyst excision (1980); and Hysterectomy (2014).    Social History:  She reports that she has never smoked. She does not have any smokeless tobacco history on file. She reports that she does not drink alcohol and does not use drugs.    Family History:  No family history on file.  Allergies:  Ace inhibitors    Outpatient Medications:  Current Outpatient Medications   Medication Instructions    aspirin 81 mg, oral, Daily    atorvastatin (Lipitor) 20 mg tablet TAKE 1 TABLET BY MOUTH EVERY DAY    atorvastatin (LIPITOR) 80 mg, oral, Daily    Brilinta 90 mg, oral, 2 times daily    docusate sodium (COLACE) 100 mg, 2 times daily    metFORMIN (GLUCOPHAGE) 500 mg, oral, 2 times daily (morning and late afternoon)    nitroglycerin (NITROSTAT) 0.4 mg, sublingual, Every 5 min PRN    valsartan (DIOVAN) 160 mg, oral, Daily     Last Recorded Vitals:  Vitals:    05/19/25 0759   BP: 162/70   BP Location: Left arm   Patient Position: Sitting   BP Cuff Size: Adult   Pulse: 79   SpO2: 100%   Weight: 72.1 kg (159 lb)   Height: 1.626 m (5' 4\")       Physical Exam:      5/19/2025     7:59 AM 3/21/2025     8:25 AM 3/21/2025     7:33 AM 3/19/2025     8:26 AM 3/19/2025     7:33 AM 2/27/2025     7:43 AM   Vitals   Systolic 162 108       Post-exercise 112       Pre-exercise 98       Post-exercise 118       Pre-exercise    Diastolic 70 60       Post-exercise 60       Pre-exercise 58       Post-exercise 68       Pre-exercise    BP Location Left arm        Heart Rate 79 70 " "61 64 58    Height 1.626 m (5' 4\")     1.661 m (5' 5.39\")   Weight (lb) 159        BMI 27.29 kg/m2     27.49 kg/m2   BSA (m2) 1.8 m2     1.87 m2   Visit Report Report          Wt Readings from Last 5 Encounters:   05/19/25 72.1 kg (159 lb)   02/21/25 75.8 kg (167 lb 3.2 oz)   02/12/25 75.8 kg (167 lb)   02/03/25 74.8 kg (165 lb)   01/24/25 77.6 kg (171 lb)       Physical Exam  Vitals reviewed.   HENT:      Head: Normocephalic.      Mouth/Throat:      Pharynx: Oropharynx is clear.   Eyes:      Pupils: Pupils are equal, round, and reactive to light.   Cardiovascular:      Rate and Rhythm: Normal rate.      Pulses: Normal pulses.      Heart sounds: Normal heart sounds.   Pulmonary:      Effort: Pulmonary effort is normal.      Breath sounds: Normal breath sounds.   Abdominal:      General: Abdomen is flat. Bowel sounds are normal.      Palpations: Abdomen is soft.   Musculoskeletal:         General: Normal range of motion.   Skin:     General: Skin is warm and dry.   Neurological:      General: No focal deficit present.      Mental Status: She is alert.   Psychiatric:         Mood and Affect: Mood normal.              Last Labs Reviewed:  CBC -  Recent Labs     01/15/25  0638 01/14/25  0606 01/13/25  0858 01/12/25  0512 07/09/24  0013   WBC 7.5 7.9 9.0 7.1 8.5   HGB 9.3* 8.2* 9.1* 9.7* 8.8*   HCT 29.9* 27.1* 29.6* 33.2* 28.5*    203 214 231 275   MCV 77* 78* 78* 80 79*     CMP -  Recent Labs     01/15/25  0638 01/14/25  0606 01/13/25  0539 01/12/25  0512 07/09/24  0013 07/24/21  0743 07/23/21  0531    137 136 133* 137   < > 137   K 4.8 3.9 3.4* 4.8 3.0*   < > 3.8    106 108* 103 105   < > 104   CO2 27 26 24 23 24   < > 24   ANIONGAP 10 9* 7* 12 11   < > 13   BUN 12 11 10 11 12   < > 17   CREATININE 0.80 0.84 0.67 0.70 0.59   < > 0.82   EGFR 80 75 >90 >90 >90   < >  --    MG 2.02 1.94 1.91 1.64  --   --  1.90   CALCIUM 8.4* 8.4* 7.6* 8.2* 7.4*   < > 8.7    < > = values in this interval not displayed. "     Recent Labs     01/15/25  0638 01/14/25  0606 01/13/25  0539 01/12/25  0512 07/09/24  0013 04/16/24  0835 04/16/24  0835 11/09/22  0817 07/20/21  0451 07/19/21 2010   ALBUMIN 3.3* 3.3* 3.0* 3.6 3.3*   < > 3.8 3.8   < > 4.0   ALKPHOS  --   --   --  102 89  --  105 86  --  66   ALT  --   --   --  8 6*  --  8 10  --  5*   AST  --   --   --  13 10  --  14 11  --  10   BILITOT  --   --   --  0.4 0.4  --  0.4 0.7  --  0.5    < > = values in this interval not displayed.     LIPID PANEL -   Recent Labs     01/12/25  0633 04/16/24  0835 11/09/22  0817 04/27/21  1215 10/19/20  0731   CHOL 160 220* 182 206* 212*   LDLF  --   --  120* 140* 155*   LDLCALC 104* 156*  --   --   --    HDL 42.3 50.7 52.0 49.6 40.6   TRIG 67 69 52 80 80     COAGULATION PANEL -  Recent Labs     07/20/21  1303 07/16/18  1011   INR  --  1.0   HAPTOGLOBIN 207*  --      HEME/ENDO -  Recent Labs     01/24/25  0826 01/14/25  0606 01/12/25  0633 01/12/25  0512 10/25/24  1204 04/16/24  0835 11/09/22  0817 07/21/21  0545 07/20/21  0400   FERRITIN  --  30  --   --   --   --   --   --  81   IRONSAT  --  8*  --   --   --   --   --   --  13*   TSH  --   --  3.09  --   --  2.83 2.81 1.95  --    HGBA1C 6.8*  --   --  7.3* 7.0* 7.0* 6.4*  --   --      CARDIOVASCULAR  Recent Labs     01/13/25  0539 01/13/25  0036 01/12/25 2013 01/12/25  1621 01/12/25  0633 01/12/25  0512 07/09/24  0013 07/20/21  0400 07/19/21 2010 05/20/21  1109 08/19/20  1554   LDH  --   --   --   --   --   --   --  140  --   --   --    TROPHS 61,834* 78,957* 69,505* >125,000* 135* 122*   < >  --   --   --   --    BNP  --   --   --   --   --  217*  --   --  92  --   --    CRP  --   --   --   --   --   --   --   --   --  0.15 1.50*    < > = values in this interval not displayed.     Last Cardiology/Imaging Tests Personally Reviewed (if images available) and Interpreted:  ECG:  Encounter Date: 01/12/25   ECG 12 lead   Result Value    Ventricular Rate 63    Atrial Rate 63    UT Interval 164     QRS Duration 88    QT Interval 512    QTC Calculation(Bazett) 523    P Axis 60    R Axis -1    T Axis 109    QRS Count 10    Q Onset 217    P Onset 135    P Offset 199    T Offset 473    QTC Fredericia 520    Narrative    Normal sinus rhythm with sinus arrhythmia  Anterior infarct (cited on or before 12-JAN-2025)  T wave abnormality, consider lateral ischemia  Prolonged QT  Abnormal ECG  When compared with ECG of 12-JAN-2025 09:19, (unconfirmed)  No significant change was found  Confirmed by Melvin Ang ([id number]) on 1/16/2025 1:11:37 PM   ECG 12 lead 01/12/2025    Echocardiogram:  Echocardiogram     [facility], [address] [place] [address]  Tel [phone] and Fax [phone]    TRANSTHORACIC ECHOCARDIOGRAM REPORT      Patient Name:     YARA Knapp Physician:  [id number] Hunter Guillen MD  Study Date:       7/21/2021          Referring           MARY NOVA  Physician:  MRN/PID:          [id number]           PCP:  Accession/Order#: [id number]          [facility] Non  Location:           Invasive  YOB: 1955          Fellow:  Gender:           F                  Nurse:              Maame Butler RN  Admit Date:       7/19/2021          Sonographer:        Amelie Parekh RD  Admission Status: Inpatient -        Additional Staff:  Priority discharge  Height:           162.56 cm          CC Report to:       [address] [place] [address]  Weight:           73.48 kg           Study Type:         Echocardiogram  BSA:              1.79 m2  Blood Pressure: 152 /67 mmHg    Diagnosis/ICD: R55-Syncope; R42-Dizziness and giddiness  Indication:    Pre-Syncope  Procedure/CPT: Echo Complete w Full Doppler-[id number]  Study Detail: The following Echo studies were performed: color flow, Doppler,  M-Mode and 2D. Technically challenging study due to body habitus  and Left sided Breast Cancer. Definity used as a contrast agent  for endocardial border definition.  Total contrast used for this  procedure was 2.0 mL via IV push.      PHYSICIAN INTERPRETATION:  Left Ventricle: The left ventricular systolic function is normal, with an estimated ejection fraction of 70-75%. Estimated left ventricular mass is increased. There are no regional wall motion abnormalities. The left ventricular cavity size is normal. The left ventricular septal wall thickness is mildly increased. There is mildly increased left ventricular posterior wall thickness. There is mild to moderate concentric left ventricular hypertrophy. Spectral Doppler shows a pseudonormal pattern of left ventricular diastolic filling.  Left Atrium: The left atrium is severely dilated.  Right Ventricle: The right ventricle is mildly enlarged. There is normal right ventricular global systolic function.  Right Atrium: The right atrium is normal in size.  Aortic Valve: The aortic valve appears structurally normal. There is mild to moderate aortic valve cusp calcification. There is evidence of mild aortic valve stenosis.  There is no evidence of aortic valve regurgitation. The peak instantaneous gradient of the aortic valve is 25.6 mmHg. The mean gradient of the aortic valve is 13.4 mmHg.  Mitral Valve: The mitral valve is mildly thickened. There is mild to moderate mitral annular calcification. There is no evidence of mitral valve regurgitation.  Tricuspid Valve: The tricuspid valve is structurally normal. No evidence of tricuspid regurgitation. The right ventricular systolic pressure is unable to be estimated.  Pulmonic Valve: The pulmonic valve is not well visualized. The pulmonic valve regurgitation was not well visualized.  Pericardium: There is no pericardial effusion noted.  Aorta: The aortic root is normal.  Systemic Veins: The inferior vena cava appears to be of normal size. There is IVC inspiratory collapse greater than 50%.  In comparison to the previous echocardiogram(s): There are no prior studies on this patient for  comparison purposes.      CONCLUSIONS:  1. The left ventricular systolic function is normal with a 70-75% estimated ejection fraction.  2. Poorly visualized anatomical structures due to suboptimal image quality.  3. Spectral Doppler shows a pseudonormal pattern of left ventricular diastolic filling.  4. Increased LV mass.  5. The left atrium is severely dilated.  6. Mild aortic valve stenosis.    QUANTITATIVE DATA SUMMARY:  2D MEASUREMENTS:  Normal Ranges:  LAs:           3.77 cm    (2.7-4.0cm)  RVIDd:         2.97 cm    (0.9-3.6cm)  IVSd:          1.40 cm    (0.6-1.1cm)  LVPWd:         1.33 cm    (0.6-1.1cm)  LVIDd:         4.12 cm    (3.9-5.9cm)  LVIDs:         2.20 cm  LV Mass Index: 117.3 g/m2  LV % FS        46.5 %    LA VOLUME:  Normal Ranges:  LA Vol A4C:        87.8 ml    (22+/-6mL/m2)  LA Vol A2C:        83.0 ml  LA Vol BP:         86.0 ml  LA Vol Index A4C:  49.1 ml/m2  LA Vol Index A2C:  46.4 ml/m2  LA Vol Index BP:   48.1 ml/m2  LA Area A4C:       25.1 cm2  LA Area A2C:       24.6 cm2  LA Major Axis A4C: 6.1 cm  LA Major Axis A2C: 6.2 cm  LA Volume Index:   48.1 ml/m2  LA Vol A4C:        82.9 ml  LA Vol A2C:        77.7 ml    M-MODE MEASUREMENTS:  Normal Ranges:  Ao Root: 2.60 cm (2.0-3.7cm)    LV SYSTOLIC FUNCTION BY 2D PLANIMETRY (MOD):  Normal Ranges:  EF-A4C View: 77.6 % (>55%)  EF-A2C View: 83.5 %  EF-Biplane:  80.9 %    LV DIASTOLIC FUNCTION:  Normal Ranges:  MV Peak E:  1.12 m/s    (0.7-1.2 m/s)  MV Peak A:  1.23 m/s    (0.42-0.7 m/s)  E/A Ratio:  0.91        (1.0-2.2)  MV e'       0.06 m/s    (>8.0)  MV A Dur:   114.18 msec  E/e' Ratio: 17.20       (<8.0)    MITRAL VALVE:  Normal Ranges:  MV DT: 248 msec (150-240msec)    AORTIC VALVE:  Normal Ranges:  AoV Vmax:                2.53 m/s  (<1.7m/s)  AoV Peak P.6 mmHg (<20mmHg)  AoV Mean P.4 mmHg (1.7-11.5mmHg)  LVOT Max Jg:            1.12 m/s  (<1.1m/s)  AoV VTI:                 67.61 cm  (18-25cm)  LVOT VTI:                 26.57 cm  LVOT Diameter:           1.99 cm   (1.8-2.4cm)  AoV Area, VTI:           1.23 cm2  (2.5-5.5cm2)  AoV Area,Vmax:           1.38 cm2  (2.5-4.5cm2)  AoV Dimensionless Index: 0.39    RIGHT VENTRICLE:  RV 1  4.5 cm  RV 2  3.0 cm  RV 3  8.0 cm  RV s' 0.18 m/s    TRICUSPID VALVE/RVSP:  Normal Ranges:  IVC Diam: 1.90 cm    PULMONIC VALVE:  Normal Ranges:  PV Max Jg: 1.0 m/s  (0.6-0.9m/s)  PV Max PG:  3.9 mmHg      [id number] Hunter Guillen MD  Electronically signed on 7/21/2021 at 9:44:33 PM      Transthoracic Echo (TTE) Limited 01/14/2025    Cath:  No results found for this or any previous visit from the past 1825 days.  Left Heart Cath, No LV, Left Heart Cath, No LV 01/12/2025    [facility], Cath Lab, [address] [place] [address]    Cardiovascular Catheterization Report    Patient Name:      YARA GUZMAN         Performing Physician: [id number] Chavez MD  Study Date:        1/12/2025            Verifying Physician:  David Chavez MD  MRN/PID:           [id number]  Accession#:        [id number]         Ordering Provider:    [id number] CHAVEZ  Date of Birth/Age: 1955 / 69 years Cardiologist:         [id number] Srinivas Perez MD  Gender:            F                    Fellow:               [id number] Anahy Ramirez MD  Encounter#:        [id number]      Study: Left Heart Cath with PCI Intensivist: [id number] Autumn Ackerman MD      Indications:  YARA GUZMAN is a 70 year old female who presents with dyslipidemia, hypertension and diabetes. YARA GUZMAN is a 70 year old female who presents with dyslipidemia, hypertension, diabetes and a chest pain assessment of typical angina. Acute coronary syndrome - STEMI. Objective evidence of ischemia, andacute MI.  Medical History:  Stress test performed: No. CTA performed: NoTj Luong accessed: No. LVEF  Assessed: No.  Cardiac arrest: No.  Cardiac surgical consult: No.  Cardiovascular instability:  No.  Frailty status of patient entering lab: 3 = Managing well.      Procedure Description:  After infiltration with 2% Lidocaine, the right radial artery was cannulated with a modified Seldinger technique. Subsequently a 6 [place] sheath was placed in the right radial artery. After infiltration with 2% Lidocaine, a second arterial access was obtained via the right femoral artery with a modified Seldinger technique and a 6 [place] sheath was placed. This second arterial access was obtained to allow for unable to obtain wire access to the right subclavian likely due to arteria lusoria. Femoral artery angiography was performed at the additional arterial access site. This demonstrated a common femoral artery puncture appropriate for closure. An Angio-Seal Evolution 6F ([employer]) vascular closure device was placed per protocol.    Coronary Angiography:  The coronary circulation is co-dominant.    Left Main Coronary Artery:  The left main coronary artery is a normal caliber vessel. The left main arises normally from the left coronary sinus of Valsalva and bifurcates into the LAD and circumflex coronary arteries. The left main coronary artery showed no significant disease or stenosis greater than 30%.    Left Anterior Descending Coronary Artery Distribution:  The mid left anterior descending coronary artery showed 100% stenosis. This lesion was thrombotic. The 1st diagonal branch showed no significant disease or stenosis greater than 30%.    Circumflex Coronary Artery Distribution:  The circumflex coronary artery is a normal caliber vessel. The circumflex arises normally from the left main coronary artery and terminates in the AV groove. The mid circumflex coronary artery showed 50% stenosis. The 1st obtuse marginal branch is a small caliber vessel. The 1st obtuse marginal branch showed no significant disease or stenosis greater than 30%. The 2nd obtuse marginal branch is a small caliber vessel. The 2nd obtuse  marginal branch demonstrated no significant disease or stenosis greater than 30%.    Right Coronary Artery Distribution:    The right coronary artery is a normal caliber vessel. The RCA arises normally from the right sinus of Valsalva. The RCA showed no significant disease or stenosis greater than 30%. The acute marginal branch showed no significant disease or stenosis greater than 30%.  The right posterolateral branch showed no significant disease or stenosis greater than 30%. The right posterior descending artery showed no significant disease or stenosis greater than 30%.    Coronary Interventions:  Angiography reveals a 100% stenosis of the mid left anterior descending coronary artery. Pre-intervention JEANCARLOS flow was 0. Percutaneous coronary intervention was performed within the mid left anterior descending. The vessel was pre-dilated using a compliant balloon 2.0 mm x 12 mm at 12 ELLI. Synergy 2.5 mm x 32 mm was advanced to the lesion and implanted at 20 ELLI. The stent was post dilated using a non-compliant balloon 2.75 mm x 15 mm at 18-24 ELLI. The stenosis was successfully reduced from 100% to 0%. Post-intervention JEANCARLOS flow was 3. Heparin was given and therapeutic ACTs were obtained for the entirity of the procedure. Patient had been loaded with ASA and Ticagrelor. The LM was engaged using a 6 Fr IKARI 3.5 guide catheter. A RunThrough was placed into the distal LAD. Initial pre-dilation was performed using a 2.0 x 12 Euphora SC at 12 elli. Two boluses of Integrelin were given. Subsequent angiogram showed JEANCARLOS 3 flow but residual thrombus in the mid LAD. Penumbra aspiration  catheter was advanced and 2 runs of aspiration were performed. A 2.5 x 32 Synergy LIAM was then deployed at 20 elli. OCT demonstrated mild malapposition without any proximal or distal edge dissections. Post-dilation was performed using a 2.75 x 15 NC Euphora at 18-24 elli with care taken to avoid the distal most edge of the stented segment. Final  angiograms showed JEANCARLOS 3 flow without any residual stenosis, guide dissection, or distal wire perforation. Equipment was removed and the femoral arteriotomy was closed with an Angioseal.    Coronary Lesion Summary:  Vessel       Stenosis    Vessel Segment  LAD        100% stenosis      mid  Circumflex 50% stenosis       mid      Hemo Personnel:  +----------------+---------+  Name            Duty       +----------------+---------+  Chance Chavez MD 1  +----------------+---------+      Hemodynamic Pressures:    +----+-------------------+---------+------------+-------------+------+---------+  Site     Date Time       Phase    Systolic    Diastolic    ED  Mean mmHg                           Name       mmHg        mmHg      mmHg            +----+-------------------+---------+------------+-------------+------+---------+    AO  1/12/2025 7:22:35 AIR REST         143           72            102                       AM                                                   +----+-------------------+---------+------------+-------------+------+---------+    LV  1/12/2025 7:36:30 AIR REST         157           11    23                                AM                                                   +----+-------------------+---------+------------+-------------+------+---------+    LV  1/12/2025 7:36:38 AIR REST         157           11    25                                AM                                                   +----+-------------------+---------+------------+-------------+------+---------+   LVp  1/12/2025 7:36:50 AIR REST         154           14    26                                AM                                                   +----+-------------------+---------+------------+-------------+------+---------+    AO  1/12/2025 7:53:06 AIR REST         106           55             75                       AM                                                    +----+-------------------+---------+------------+-------------+------+---------+      Cardiac Cath Post Procedure Notes:  Post Procedure Diagnosis: Successful PCI of the LAD.  Blood Loss:               Estimated blood loss during the procedure was 10 mls.  Specimens Removed:        Number of specimen(s) removed: none.      Recommendations:  Optimize medical therapy.  Agressive risk factor modification efforts.  Anti-platelet therapy with Aspirin and Ticagrelor 90mgs BID.  Consider referral to cardiac rehabilitation.  Medical management of coronary artery disease.  Follow recommendations per Dr Perez/Dr Ackerman.    ____________________________________________________________________________________  CONCLUSIONS:  1. Anterior STEMI  2. LAD: mid 100% thrombotic stenosis s/p aspiration thrombectomy and OCT guided PCI with deployement of a 2.5 x 32 Synergy LIAM.  3. Rt dominant system. LM: no obstructive CAD. LCX: mid 50% stenosis. RCA: no obstructive CAD  4. Successful PPCI -LAD with Penumbra thrombectomy and LIAM with OCT guidance: Excellent results JEANCARLOS 3 flow and zero residual stenosis.    ICD 10 Codes:  ST elevation (STEMI) myocardial infarction involving left anterior descending coronary artery-I21.02    CPT Codes:  Ultrasound guidance for vascular access-55415; Revasc during AMI stent/angio/atherc,ins asp Thrombectomy, Left Anterior Descending single Vessel (PCI)-16910.LD; Thrombectomy, aspiration-91253; OCT Initial Vessel (coronary native vessel or graft) during diagnostic evaluation and/or therapeutic intervention, initial vessel-69058; Moderate Sedation Services initial 15 minutes patient >5 years-11803; Moderate Sedation Services 2nd additional 15 minutes patient >5 years-38206; Moderate Sedation Services 1st additional 15 minutes patient >5 years-00134; Moderate Sedation Services 3rd additional 15 minutes patient >5 years-19875; Moderate Sedation Services  4th additional 15 minutes patient >5 years-18255    [id number] Brett Chavez MD  Performing Physician  Electronically signed by [id number] Brett Chavez MD on 1/12/2025 at 7:01:18  PM          ** Final **  Cardiac Catheterization Procedure 01/12/2025    Stress Test:  NM CARDIAC STRESS REST (MYOCARDIAL PERFUSION MIBI) 05/25/2021    Narrative  MRN: [id number]  Patient Name: YARA GUZMAN    STUDY:  PART 2 STRESS OR REST (NO CHARGE); CARDIAC STRESS/REST (MYOCARDIAL  PERFUSION/MIBI); CARDIAC STRESS/REST INJECTION;  5/25/2021 12:43 pm;  5/25/2021 12:38 pm; 5/25/2021 10:51 am    INDICATION:  Chest pain.    COMPARISON:  None.    ACCESSION NUMBER(S):  [id number]; [id number]; [id number]    ORDERING CLINICIAN:  Kayla Goodwin CNP    TECHNIQUE:  DIVISION OF NUCLEAR MEDICINE  PHARMACOLOGIC STRESS MYOCARDIAL PERFUSION SCAN, ONE DAY PROTOCOL    The patient received an intravenous dose of 10.1 mCi of Tc-99m  Myoview and resting emission tomographic (SPECT) images of the  myocardium were acquired. The patient then received an intravenous  infusion of 0.4 mg regadenoson (Lexiscan) followed by an additional  dose of 32.3 mCi of Tc-99m  Myoview. Stress phase SPECT images of the  myocardium were then acquired. These included ECG-gated images to  assess and quantify ventricular function.    FINDINGS:  Stress and rest images both demonstrate a normal distribution of  perfusion throughout all LV segments with no sign of ischemia except  for moderately reduced apical perfusion consistent with breast  attenuation artifact. Apical ischemia can not be completely excluded.    TID: 1.06    ECG-gated images demonstrate normal LV size and myocardial  contractility with an LV ejection fraction of 78 % (normal above 45  percent).    Impression  1. Relatively normal stress myocardial perfusion imaging in response  to pharmacologic stress in the setting of moderate breast attenuation.  2. Well-maintained left ventricular function.  No results found  "for this or any previous visit from the past 1095 days.    Cardiac CT/MRI:  CT HEART CALCIUM SCORING WO IV CONTRAST 05/06/2021    Narrative  MRN: [id number]  Patient Name: YARA GUZMAN    STUDY:  CT CARDIAC SCORING;  5/6/2021 8:30 am    INDICATION:  CP.    COMPARISON:  None.    ACCESSION NUMBER(S):  [id number]    ORDERING CLINICIAN:  MARIELLA KENNEDY    TECHNIQUE:  Using prospective ECG gating, CT scan of the coronary arteries was  performed without intravenous contrast. Coronary calcium scoring  was  performed according to the method of Agatston.    FINDINGS:  The score and distribution of calcium in the coronary arteries is as  follows:    LM 3.3    LCx 77  RCA 7.5    Total 208    The visualized mid/lower ascending thoracic aorta measures 3.0 cm in  diameter. Prominence of the main pulmonary artery measuring 39 mm.  Can not exclude pulmonary artery hypertension.. The heart is normal  in size. No pericardial effusion is present.    No gross evidence of mediastinal or hilar lymphadenopathy or masses  is identified. The visualized segments of the lungs are hyperinflated.    The visualized subdiaphragmatic structures appear intact.    Impression  1. Coronary artery calcium score of 208*.    *Coronary artery calcium scoring may be helpful in predicting the  risk for future coronary heart disease events.  According to the  American College [employer] Foundation Clinical Expert Consensus  Task Force, such testing provides important prognostic information in  patients with more than one coronary heart disease risk factor. The  coronary artery calcium score correlates with the annual risk of a  non-fatal myocardial infarction or coronary heart disease death.    Coronary artery score            Annual Risk    0-99                             0.4%  100-399                        1.3%  >400                            2.4%    These three \"breakpoints\" correspond to lower, intermediate and high  risk states for future " coronary events.  Such information should be  used, along with appropriate clinical judgment, to make decisions  regarding the intensity of risk factor management strategies to treat  blood lipids and to modify other non-lipid coronary risk factors.    Reference: Corydon P et al. Circulation.  2007; 115:402-426  No results found for this or any previous visit from the past 1095 days.    Other CT:      CV RISK FACTORS:   # Hypertension: Last BP: 162/70.  # Hyperlipidemia: Last Tchol 160 / LDL No results found for requested labs within last 365 days. / HDL 42.3 / TRIG 67 (1/12/2025:  6:33 AM).  # Type II Diabetes Mellitus: Last A1c 6.8 (1/24/2025:  8:26 AM).  # Obesity: Last BMI: 27.28.  # CKD: Last BUN/Cr (GFR): 12/0.80 (80), 1/15/2025:  6:38 AM.    ASCV RISK:  The ASCVD Risk score (Colton POWELL, et al., 2019) failed to calculate for the following reasons:    Risk score cannot be calculated because patient has a medical history suggesting prior/existing ASCVD    Assessment/Plan   Marsha Tapia is a 69-year-old female with a complex medical history including hypertension, hyperlipidemia, asymptomatic sinus bradycardia, type 2 diabetes, breast cancer status post mastectomy/chemotherapy/radiation, IgA deficiency, aortic stenosis, osteoarthritis, GERD, history of gastric bypass, and a STEMI with recent PCI to the mid-LAD. She presents today for follow-up following her STEMI on 1/12/2025. At that time, she experienced anterior STEMI due to 100% thrombotic occlusion of the mid-LAD, successfully treated with aspiration thrombectomy and PCI (Synergy 2.5x32 LIAM). Initial echocardiogram revealed normal LV ejection fraction (60%), septal wall motion abnormalities, and mild-to-moderate aortic stenosis.    Since discharge, the patient reports feeling well, actively participating in cardiac rehab without issues, adhering to prescribed medications without complications, and performing activities of daily living normally. She denies  chest pain, dyspnea, orthopnea, PND, palpitations, dizziness, or syncope.    Current Medications: ASA and Ticagrelor for dual antiplatelet therapy (DAPT) - Atorvastatin 80 mg daily - Valsartan 160mg     Recent Labs/Imaging:  -Echocardiogram: Normal LV systolic function (EF 60%), abnormal septal wall motion, elevated left atrial pressure, mild-to-moderate aortic stenosis.  -HgbA1C: 7.3%    Assessment: 69-year-old female post-anterior STEMI and mid-LAD PCI, doing well overall with no current symptoms of ischemia or heart failure. Echocardiogram findings indicate mild to moderate aortic stenosis and septal wall motion abnormalities secondary to ischemia. Hypertension and diabetes are reasonably controlled, but close monitoring is essential to maintain optimal secondary prevention and prevent restenosis or recurrent ischemic events.    Diagnostics  #Stable post-STEMI with residual wall motion abnormalities  #Progression of mild to moderate aortic stenosis  #Diastolic dysfunction with elevated left atrial pressure    Plan:  -Secondary prevention: Continue DAPT (ASA 81 mg + Ticagrelor 90 mg BID for 1 year) followed by lifelong ASA monotherapy. Ensure compliance without interruption.  -Lipid management: Maintain Atorvastatin 80 mg daily, recheck lipid panel (pending)  -Blood pressure: Continue Valsartan 160mg daily. Monitor BP closely, especially given elevated pressures today  -Diabetes management: Continue management with primary care. Last A1c was 6.8  -Cardiac rehab: Completed without issues  -Aortic stenosis: Monitor progression of AS with serial echocardiograms every 12 months.    Overall, the patient is on track, and her condition is stable under current therapy.    Recommendations:  - Continue medications> Aspirin, Brilinta, Valsartan, Atorvastatin  - Follow up in clinic in 6 months with the result of a lipid panel  - I spent 45 minutes assessing the case between pre-charting, face-to-face patient interaction, and  documentation    Srinivas Perez MD

## 2025-05-19 NOTE — PATIENT INSTRUCTIONS
- Continue medications> Aspirin, Brilinta, Valsartan, Atorvastatin  - Follow up in clinic in 6 months with the result of a lipid panel    Monitor your blood pressure at home and keep a log of the readings    Steps to check your blood pressure at home    Be still> Don't smoke, drink caffeinated beverages or exercise within 30 minutes before measuring your blood pressure. Empty your bladder and ensure at least five minutes of quiet rest before measurements.   Sit correctly. Sit with your back straight and supported (on a dining chair, rather than a sofa). Your feet should be flat on the floor and your legs should not be crossed. Your arm should be supported on a flat surface, such as a table, with the upper arm at heart level. Make sure the bottom of the cuff is placed directly above the bend of the elbow. Check your monitor's instructions for an illustration or have your health care professional show you how.  Measure at the same time every day. It’s important to take the readings at the same time each day, such as morning and evening. It is best to take the readings daily, ideally beginning two weeks after a change in treatment and during the week before your next appointment.  Take multiple readings and record the results. Each time you measure, take two readings one minute apart and record the results using a printable (PDF) tracker. If your monitor has built-in memory to store your readings, take it with you to your appointments. Some monitors may also allow you to upload your readings to a secure website after you register your profile.  Don't take the measurement over clothes    You can review the information here  [email or web address]_to_[email or web address]

## 2025-05-21 ENCOUNTER — APPOINTMENT (OUTPATIENT)
Dept: CARDIOLOGY | Facility: CLINIC | Age: 70
End: 2025-05-21
Payer: MEDICARE

## 2025-05-22 ENCOUNTER — OFFICE VISIT (OUTPATIENT)
Dept: PRIMARY CARE | Facility: CLINIC | Age: 70
End: 2025-05-22
Payer: MEDICARE

## 2025-05-22 VITALS
DIASTOLIC BLOOD PRESSURE: 70 MMHG | RESPIRATION RATE: 16 BRPM | WEIGHT: 164 LBS | TEMPERATURE: 98.1 F | HEART RATE: 62 BPM | SYSTOLIC BLOOD PRESSURE: 120 MMHG | BODY MASS INDEX: 28.15 KG/M2

## 2025-05-22 DIAGNOSIS — J20.9 ACUTE BRONCHITIS, UNSPECIFIED ORGANISM: Primary | ICD-10-CM

## 2025-05-22 DIAGNOSIS — E04.1 THYROID NODULE: ICD-10-CM

## 2025-05-22 DIAGNOSIS — R59.0 MEDIASTINAL LYMPHADENOPATHY: ICD-10-CM

## 2025-05-22 PROCEDURE — G2211 COMPLEX E/M VISIT ADD ON: HCPCS | Performed by: INTERNAL MEDICINE

## 2025-05-22 PROCEDURE — 99214 OFFICE O/P EST MOD 30 MIN: CPT | Performed by: INTERNAL MEDICINE

## 2025-05-22 PROCEDURE — 3049F LDL-C 100-129 MG/DL: CPT | Performed by: INTERNAL MEDICINE

## 2025-05-22 PROCEDURE — 3044F HG A1C LEVEL LT 7.0%: CPT | Performed by: INTERNAL MEDICINE

## 2025-05-22 PROCEDURE — 3074F SYST BP LT 130 MM HG: CPT | Performed by: INTERNAL MEDICINE

## 2025-05-22 PROCEDURE — 3078F DIAST BP <80 MM HG: CPT | Performed by: INTERNAL MEDICINE

## 2025-05-22 PROCEDURE — 4010F ACE/ARB THERAPY RXD/TAKEN: CPT | Performed by: INTERNAL MEDICINE

## 2025-05-22 RX ORDER — BENZONATATE 100 MG/1
100 CAPSULE ORAL 3 TIMES DAILY PRN
Qty: 42 CAPSULE | Refills: 0 | Status: SHIPPED | OUTPATIENT
Start: 2025-05-22 | End: 2025-06-21

## 2025-05-22 RX ORDER — AZITHROMYCIN 250 MG/1
TABLET, FILM COATED ORAL
Qty: 6 TABLET | Refills: 0 | Status: SHIPPED | OUTPATIENT
Start: 2025-05-22 | End: 2025-05-27

## 2025-05-22 NOTE — PROGRESS NOTES
Marsha Tapia is a 69 y.o. female   Patient with a past medical history of breast CA s/p mastectomy, chemo and radiation, DM, HTN, HLD, IgA deficiency, chronic diarrhea, CAD s/p PCI of LAD (1/25), Osteoarthritis, severe cervical central canal stenosis with cord compression and evidence of cord edema at C3-4 and C5-6,s/p C3-C6 laminoplasty on 7/23/21, Hearing loss, Colonoscopy due in 2031, MAmmogram (March)    Has a cold  Productive cough  SOB  No chest pain  No NVD         Review of Systems     Constitutional: no fever, no chills, not feeling poorly, not feeling tired and no recent weight gain, no recent weight loss.   ENT: no earache, no hearing loss, no nosebleeds, no nasal discharge, no sore throat and no hoarseness.   Cardiovascular: the heart rate was not slow, the heart rate was not fast, no chest pain, no palpitations, no intermittent leg claudication and no lower extremity edema.   Respiratory: Cough  Gastrointestinal: no abdominal pain, no constipation, no melena, no nausea, no diarrhea, no vomiting and no blood in stools.   Musculoskeletal: no arthralgias, no myalgias, no back pain, no joint swelling, no joint stiffness, no limb pain and no limb swelling.   Integumentary: no skin rashes, no skin lesions, no itching, no skin wound and no dry skin.   Neurological: no headache, no confusion, no numbness, no dizziness, no tingling and no fainting.   All other systems have been reviewed and are negative for complaint.     Current Outpatient Medications   Medication Instructions    aspirin 81 mg, oral, Daily    atorvastatin (Lipitor) 20 mg tablet TAKE 1 TABLET BY MOUTH EVERY DAY    atorvastatin (LIPITOR) 80 mg, oral, Daily    benzonatate (TESSALON) 100 mg, oral, 3 times daily PRN, Do not crush or chew.    Brilinta 90 mg, oral, 2 times daily    docusate sodium (COLACE) 100 mg, 2 times daily    metFORMIN (GLUCOPHAGE) 500 mg, oral, 2 times daily (morning and late afternoon)    nitroglycerin (NITROSTAT) 0.4 mg,  sublingual, Every 5 min PRN    valsartan (DIOVAN) 160 mg, oral, Daily         Vitals:    05/22/25 1146   Temp: 36.7 °C (98.1 °F)        Physical Exam     Constitutional   General appearance: Alert and in no acute distress.     Pulmonary   Respiratory assessment: No respiratory distress, normal respiratory rhythm and effort.    Auscultation of Lungs: Clear bilateral breath sounds.   Cardiovascular   Auscultation of heart: Apical pulse normal, heart rate and rhythm normal, normal S1 and S2, no murmurs and no pericardial rub.    Exam for edema: No peripheral edema.   Abdomen   Abdominal Exam: No bruits, normal bowel sounds, soft, non-tender, no abdominal mass palpated.    Liver and Spleen exam: No hepato-splenomegaly.   Musculoskeletal   Examination of gait: Normal.    Inspection of digits and nails: No clubbing or cyanosis of the fingernails.    Inspection/palpation of joints, bones and muscles: No joint swelling. Normal movement of all extremities.   Skin   Skin inspection: Normal skin color and pigmentation, normal skin turgor and no visible rash.   Neurologic   Cranial nerves: Nerves 2-12 were intact, no focal neuro defects.    Assessment/Plan          Patient with a past medical history of breast CA s/p mastectomy, chemo and radiation, DM, HTN, HLD, IgA deficiency, iron deficiency anemia, chronic diarrhea, CAD s/p PCI of LAD (1/25), Osteoarthritis, severe cervical central canal stenosis with cord compression and evidence of cord edema at C3-4 and C5-6,s/p C3-C6 laminoplasty on 7/23/21, Hearing loss, Colonoscopy due in 2031, MAmmogram (March)     # AC bronchitis  Z nils  Tessalon perles  Will still need a repeat CT chest in 3 months to assess the mediastinal lymph nodes    #Thyroid Nodule seen on CT chest   check USG     #Coronary artery disease s/p PCI  Continue Brilinta for at least 1 year  Continue statins lifelong and aspirin lifelong    #Hypertension  Stable       #Diabetes mellitus  Will restart metformin  daily     #Dyslipidemia with elevated calcium scores  Target LDL less than 70

## 2025-06-05 ENCOUNTER — APPOINTMENT (OUTPATIENT)
Dept: PRIMARY CARE | Facility: CLINIC | Age: 70
End: 2025-06-05
Payer: MEDICARE

## 2025-06-10 ENCOUNTER — OFFICE VISIT (OUTPATIENT)
Dept: SURGERY | Facility: CLINIC | Age: 70
End: 2025-06-10
Payer: MEDICARE

## 2025-06-10 VITALS
DIASTOLIC BLOOD PRESSURE: 66 MMHG | HEIGHT: 64 IN | BODY MASS INDEX: 28.48 KG/M2 | HEART RATE: 59 BPM | OXYGEN SATURATION: 100 % | TEMPERATURE: 97.9 F | SYSTOLIC BLOOD PRESSURE: 192 MMHG | WEIGHT: 166.8 LBS

## 2025-06-10 DIAGNOSIS — R22.31 AXILLARY MASS, RIGHT: Primary | ICD-10-CM

## 2025-06-10 PROCEDURE — 3078F DIAST BP <80 MM HG: CPT | Performed by: STUDENT IN AN ORGANIZED HEALTH CARE EDUCATION/TRAINING PROGRAM

## 2025-06-10 PROCEDURE — 3008F BODY MASS INDEX DOCD: CPT | Performed by: STUDENT IN AN ORGANIZED HEALTH CARE EDUCATION/TRAINING PROGRAM

## 2025-06-10 PROCEDURE — 4010F ACE/ARB THERAPY RXD/TAKEN: CPT | Performed by: STUDENT IN AN ORGANIZED HEALTH CARE EDUCATION/TRAINING PROGRAM

## 2025-06-10 PROCEDURE — 99213 OFFICE O/P EST LOW 20 MIN: CPT | Performed by: STUDENT IN AN ORGANIZED HEALTH CARE EDUCATION/TRAINING PROGRAM

## 2025-06-10 PROCEDURE — 3049F LDL-C 100-129 MG/DL: CPT | Performed by: STUDENT IN AN ORGANIZED HEALTH CARE EDUCATION/TRAINING PROGRAM

## 2025-06-10 PROCEDURE — 3044F HG A1C LEVEL LT 7.0%: CPT | Performed by: STUDENT IN AN ORGANIZED HEALTH CARE EDUCATION/TRAINING PROGRAM

## 2025-06-10 PROCEDURE — 3077F SYST BP >= 140 MM HG: CPT | Performed by: STUDENT IN AN ORGANIZED HEALTH CARE EDUCATION/TRAINING PROGRAM

## 2025-06-10 PROCEDURE — 1126F AMNT PAIN NOTED NONE PRSNT: CPT | Performed by: STUDENT IN AN ORGANIZED HEALTH CARE EDUCATION/TRAINING PROGRAM

## 2025-06-10 PROCEDURE — 1159F MED LIST DOCD IN RCRD: CPT | Performed by: STUDENT IN AN ORGANIZED HEALTH CARE EDUCATION/TRAINING PROGRAM

## 2025-06-10 ASSESSMENT — PAIN SCALES - GENERAL: PAINLEVEL_OUTOF10: 0-NO PAIN

## 2025-06-10 NOTE — PROGRESS NOTES
History Of Present Illness  Patient is a 69 y.o. female who presents to discuss additional workup for her right axillary mass.  I did see her back in October for this area however at that time the cyst had previously been draining and there was no evidence of residual cyst at that time.  Therefore no intervention was recommended at that time.  However since then the cyst has recurred and now caused her again intermittent symptoms.  She denies any current infectious issues.  She has had an ultrasound of the area which does show a fluid containing soft tissue mass as well as a CT scan.  MRI was nondiagnostic due to her previous right shoulder replacement.     Past Medical History  Medical History[1]    Surgical History  Surgical History[2]     Social History  She reports that she has never smoked. She does not have any smokeless tobacco history on file. She reports that she does not drink alcohol and does not use drugs.    Family History  Family History[3]     Allergies  Ace inhibitors    Review of Systems  - CONSTITUTIONAL: Denies fever and chills.  - HEENT: Denies changes in vision and hearing.  - RESPIRATORY: Denies SOB and cough.  - CV: Denies palpitations and CP.  - GI: see HPI  - : Denies dysuria and urinary frequency.  - MSK: Denies myalgia and joint pain.  - SKIN: Denies rash and pruritus.  - NEUROLOGICAL: Denies headache and syncope.  - PSYCHIATRIC: Denies recent changes in mood. Denies anxiety and depression.     Physical Exam  - GENERAL: Alert and oriented x 3. No acute distress. Well-nourished.  - EYES: EOMI. Anicteric.  - HENT: Moist mucous membranes. No scleral icterus. No cervical lymphadenopathy.  - LUNGS: Breathing comfortably on room air. No accessory muscle use, no distress.  - CARDIOVASCULAR: Regular rate and rhythm. No murmur. No JVD.  - ABDOMEN: Soft, non-tender and non-distended. No palpable masses.  - EXTREMITIES: In the anterior portion of the right axilla is a palpable 3 cm soft tissue  "mass.  Exact etiology unknown at this time, minimally tender to palpation, no overlying skin changes.  - SKIN: No rashes or lesions. Warm.  - NEUROLOGIC: No focal neurological deficits. CN II-XII grossly intact, but not individually tested.  - PSYCHIATRIC: Cooperative. Appropriate mood and affect.    Last Recorded Vitals  Blood pressure (!) 192/66, pulse 59, temperature 36.6 °C (97.9 °F), height 1.626 m (5' 4\"), weight 75.7 kg (166 lb 12.8 oz), SpO2 100%.    Relevant Results  No results found.     Assessment and Plan  Patient is a 69 y.o. female who presents with a recurrent right axillary soft tissue mass.  This mass has previously been drained multiple times and has not recurred.  It is currently not  infected and therefore I recommended excision.  The risk benefits of surgery were explained the patient.  The risks of bleeding, infection, nonresolution of symptoms, mass recurrence, anesthesia complications were explained the patient she voiced understanding.  Her questions were answered at this time to be scheduled for the above procedure in the near future without the need for PAT.    Hasmukh Daniels MD         [1]   Past Medical History:  Diagnosis Date    Atypical ductal hyperplasia, breast 2016    BRCA1 gene mutation positive 2016    BRCA2 gene mutation positive 2016    Breast cancer 2016    Breast cyst 2007    Breast injury 2016    Endometrial cancer (Multi) 2016    Fibrocystic breast 2016    Hx antineoplastic chemo     Lobular carcinoma in situ 2016    Localized enlarged lymph nodes 06/22/2021    Axillary adenopathy    Personal history of irradiation     Personal history of malignant neoplasm of breast     History of malignant neoplasm of left breast    Usual hyperplasia of lactiferous duct 2016   [2]   Past Surgical History:  Procedure Laterality Date    BREAST BIOPSY  2016    BREAST CYST EXCISION  1980    BREAST LUMPECTOMY  2016    CARDIAC CATHETERIZATION N/A 01/12/2025    Procedure: Left Heart Cath, No " LV;  Surgeon: Brett Chavez MD;  Location: [facility] Cardiac [facility];  Service: Cardiovascular;  Laterality: N/A;    CARDIAC CATHETERIZATION N/A 01/12/2025    Procedure: PCI;  Surgeon: Brett Chavez MD;  Location: [facility] Cardiac Cath [facility];  Service: Cardiovascular;  Laterality: N/A;    HYSTERECTOMY  2014    MR HEAD ANGIO WO IV CONTRAST  07/20/2021    MR HEAD ANGIO WO IV CONTRAST 7/20/2021 [facility]    MR NECK ANGIO WO IV CONTRAST  07/20/2021    MR NECK ANGIO WO IV CONTRAST 7/20/2021 [facility]    OTHER SURGICAL HISTORY  10/12/2020    Shoulder replacement    OTHER SURGICAL HISTORY  10/12/2020    Bariatric surgery    OTHER SURGICAL HISTORY  10/12/2020    Cataract surgery    OTHER SURGICAL HISTORY  10/12/2020    Hysterectomy    OTHER SURGICAL HISTORY  10/28/2022    Lumpectomy    OTHER SURGICAL HISTORY  09/30/2021    Cyst excision   [3] No family history on file.

## 2025-06-10 NOTE — H&P (VIEW-ONLY)
History Of Present Illness  Patient is a 69 y.o. female who presents to discuss additional workup for her right axillary mass.  I did see her back in October for this area however at that time the cyst had previously been draining and there was no evidence of residual cyst at that time.  Therefore no intervention was recommended at that time.  However since then the cyst has recurred and now caused her again intermittent symptoms.  She denies any current infectious issues.  She has had an ultrasound of the area which does show a fluid containing soft tissue mass as well as a CT scan.  MRI was nondiagnostic due to her previous right shoulder replacement.     Past Medical History  Medical History[1]    Surgical History  Surgical History[2]     Social History  She reports that she has never smoked. She does not have any smokeless tobacco history on file. She reports that she does not drink alcohol and does not use drugs.    Family History  Family History[3]     Allergies  Ace inhibitors    Review of Systems  - CONSTITUTIONAL: Denies fever and chills.  - HEENT: Denies changes in vision and hearing.  - RESPIRATORY: Denies SOB and cough.  - CV: Denies palpitations and CP.  - GI: see HPI  - : Denies dysuria and urinary frequency.  - MSK: Denies myalgia and joint pain.  - SKIN: Denies rash and pruritus.  - NEUROLOGICAL: Denies headache and syncope.  - PSYCHIATRIC: Denies recent changes in mood. Denies anxiety and depression.     Physical Exam  - GENERAL: Alert and oriented x 3. No acute distress. Well-nourished.  - EYES: EOMI. Anicteric.  - HENT: Moist mucous membranes. No scleral icterus. No cervical lymphadenopathy.  - LUNGS: Breathing comfortably on room air. No accessory muscle use, no distress.  - CARDIOVASCULAR: Regular rate and rhythm. No murmur. No JVD.  - ABDOMEN: Soft, non-tender and non-distended. No palpable masses.  - EXTREMITIES: In the anterior portion of the right axilla is a palpable 3 cm soft tissue  "mass.  Exact etiology unknown at this time, minimally tender to palpation, no overlying skin changes.  - SKIN: No rashes or lesions. Warm.  - NEUROLOGIC: No focal neurological deficits. CN II-XII grossly intact, but not individually tested.  - PSYCHIATRIC: Cooperative. Appropriate mood and affect.    Last Recorded Vitals  Blood pressure (!) 192/66, pulse 59, temperature 36.6 °C (97.9 °F), height 1.626 m (5' 4\"), weight 75.7 kg (166 lb 12.8 oz), SpO2 100%.    Relevant Results  No results found.     Assessment and Plan  Patient is a 69 y.o. female who presents with a recurrent right axillary soft tissue mass.  This mass has previously been drained multiple times and has not recurred.  It is currently not  infected and therefore I recommended excision.  The risk benefits of surgery were explained the patient.  The risks of bleeding, infection, nonresolution of symptoms, mass recurrence, anesthesia complications were explained the patient she voiced understanding.  Her questions were answered at this time to be scheduled for the above procedure in the near future without the need for PAT.    Hasmukh Daniels MD         [1]   Past Medical History:  Diagnosis Date    Atypical ductal hyperplasia, breast 2016    BRCA1 gene mutation positive 2016    BRCA2 gene mutation positive 2016    Breast cancer 2016    Breast cyst 2007    Breast injury 2016    Endometrial cancer (Multi) 2016    Fibrocystic breast 2016    Hx antineoplastic chemo     Lobular carcinoma in situ 2016    Localized enlarged lymph nodes 06/22/2021    Axillary adenopathy    Personal history of irradiation     Personal history of malignant neoplasm of breast     History of malignant neoplasm of left breast    Usual hyperplasia of lactiferous duct 2016   [2]   Past Surgical History:  Procedure Laterality Date    BREAST BIOPSY  2016    BREAST CYST EXCISION  1980    BREAST LUMPECTOMY  2016    CARDIAC CATHETERIZATION N/A 01/12/2025    Procedure: Left Heart Cath, No " LV;  Surgeon: Brett Chavez MD;  Location: [facility];  Service: Cardiovascular;  Laterality: N/A;    CARDIAC CATHETERIZATION N/A 01/12/2025    Procedure: PCI;  Surgeon: Brett Chavez MD;  Location: [facility] Cardiac Cath Lab;  Service: Cardiovascular;  Laterality: N/A;    HYSTERECTOMY  2014    MR HEAD ANGIO WO IV CONTRAST  07/20/2021    MR HEAD ANGIO WO IV CONTRAST 7/20/2021 [facility]    MR NECK ANGIO WO IV CONTRAST  07/20/2021    MR NECK ANGIO WO IV CONTRAST 7/20/2021 [facility]    OTHER SURGICAL HISTORY  10/12/2020    Shoulder replacement    OTHER SURGICAL HISTORY  10/12/2020    Bariatric surgery    OTHER SURGICAL HISTORY  10/12/2020    Cataract surgery    OTHER SURGICAL HISTORY  10/12/2020    Hysterectomy    OTHER SURGICAL HISTORY  10/28/2022    Lumpectomy    OTHER SURGICAL HISTORY  09/30/2021    Cyst excision   [3] No family history on file.

## 2025-06-13 ENCOUNTER — HOSPITAL ENCOUNTER (OUTPATIENT)
Facility: HOSPITAL | Age: 70
Setting detail: OUTPATIENT SURGERY
Discharge: HOME | End: 2025-06-13
Attending: STUDENT IN AN ORGANIZED HEALTH CARE EDUCATION/TRAINING PROGRAM | Admitting: STUDENT IN AN ORGANIZED HEALTH CARE EDUCATION/TRAINING PROGRAM
Payer: MEDICARE

## 2025-06-13 ENCOUNTER — ANESTHESIA EVENT (OUTPATIENT)
Dept: OPERATING ROOM | Facility: HOSPITAL | Age: 70
End: 2025-06-13
Payer: MEDICARE

## 2025-06-13 ENCOUNTER — ANESTHESIA (OUTPATIENT)
Dept: OPERATING ROOM | Facility: HOSPITAL | Age: 70
End: 2025-06-13
Payer: MEDICARE

## 2025-06-13 VITALS
BODY MASS INDEX: 28.27 KG/M2 | HEIGHT: 64 IN | TEMPERATURE: 97 F | DIASTOLIC BLOOD PRESSURE: 67 MMHG | HEART RATE: 56 BPM | SYSTOLIC BLOOD PRESSURE: 156 MMHG | OXYGEN SATURATION: 96 % | WEIGHT: 165.57 LBS | RESPIRATION RATE: 16 BRPM

## 2025-06-13 DIAGNOSIS — R22.31 AXILLARY MASS, RIGHT: ICD-10-CM

## 2025-06-13 DIAGNOSIS — L02.91 ABSCESS: Primary | ICD-10-CM

## 2025-06-13 LAB
GLUCOSE BLD MANUAL STRIP-MCNC: 81 MG/DL (ref 74–99)
GLUCOSE BLD MANUAL STRIP-MCNC: 89 MG/DL (ref 74–99)

## 2025-06-13 PROCEDURE — RXMED WILLOW AMBULATORY MEDICATION CHARGE

## 2025-06-13 PROCEDURE — 12032 INTMD RPR S/A/T/EXT 2.6-7.5: CPT | Performed by: STUDENT IN AN ORGANIZED HEALTH CARE EDUCATION/TRAINING PROGRAM

## 2025-06-13 PROCEDURE — 2500000005 HC RX 250 GENERAL PHARMACY W/O HCPCS: Performed by: STUDENT IN AN ORGANIZED HEALTH CARE EDUCATION/TRAINING PROGRAM

## 2025-06-13 PROCEDURE — 3600000003 HC OR TIME - INITIAL BASE CHARGE - PROCEDURE LEVEL THREE: Performed by: STUDENT IN AN ORGANIZED HEALTH CARE EDUCATION/TRAINING PROGRAM

## 2025-06-13 PROCEDURE — 7100000009 HC PHASE TWO TIME - INITIAL BASE CHARGE: Performed by: STUDENT IN AN ORGANIZED HEALTH CARE EDUCATION/TRAINING PROGRAM

## 2025-06-13 PROCEDURE — 2500000004 HC RX 250 GENERAL PHARMACY W/ HCPCS (ALT 636 FOR OP/ED): Performed by: STUDENT IN AN ORGANIZED HEALTH CARE EDUCATION/TRAINING PROGRAM

## 2025-06-13 PROCEDURE — 3700000001 HC GENERAL ANESTHESIA TIME - INITIAL BASE CHARGE: Performed by: STUDENT IN AN ORGANIZED HEALTH CARE EDUCATION/TRAINING PROGRAM

## 2025-06-13 PROCEDURE — 2500000001 HC RX 250 WO HCPCS SELF ADMINISTERED DRUGS (ALT 637 FOR MEDICARE OP): Performed by: ANESTHESIOLOGY

## 2025-06-13 PROCEDURE — 7100000010 HC PHASE TWO TIME - EACH INCREMENTAL 1 MINUTE: Performed by: STUDENT IN AN ORGANIZED HEALTH CARE EDUCATION/TRAINING PROGRAM

## 2025-06-13 PROCEDURE — 0752T DGTZ GLS MCRSCP SLD LVL III: CPT | Mod: TC,AHULAB,WESLAB | Performed by: STUDENT IN AN ORGANIZED HEALTH CARE EDUCATION/TRAINING PROGRAM

## 2025-06-13 PROCEDURE — 2500000004 HC RX 250 GENERAL PHARMACY W/ HCPCS (ALT 636 FOR OP/ED): Performed by: REGISTERED NURSE

## 2025-06-13 PROCEDURE — 3700000002 HC GENERAL ANESTHESIA TIME - EACH INCREMENTAL 1 MINUTE: Performed by: STUDENT IN AN ORGANIZED HEALTH CARE EDUCATION/TRAINING PROGRAM

## 2025-06-13 PROCEDURE — 82947 ASSAY GLUCOSE BLOOD QUANT: CPT

## 2025-06-13 PROCEDURE — 7100000001 HC RECOVERY ROOM TIME - INITIAL BASE CHARGE: Performed by: STUDENT IN AN ORGANIZED HEALTH CARE EDUCATION/TRAINING PROGRAM

## 2025-06-13 PROCEDURE — 88304 TISSUE EXAM BY PATHOLOGIST: CPT | Performed by: PATHOLOGY

## 2025-06-13 PROCEDURE — 2720000007 HC OR 272 NO HCPCS: Performed by: STUDENT IN AN ORGANIZED HEALTH CARE EDUCATION/TRAINING PROGRAM

## 2025-06-13 PROCEDURE — 23076 EXC SHOULDER TUM DEEP < 5 CM: CPT | Performed by: STUDENT IN AN ORGANIZED HEALTH CARE EDUCATION/TRAINING PROGRAM

## 2025-06-13 PROCEDURE — 87075 CULTR BACTERIA EXCEPT BLOOD: CPT | Mod: AHULAB | Performed by: STUDENT IN AN ORGANIZED HEALTH CARE EDUCATION/TRAINING PROGRAM

## 2025-06-13 PROCEDURE — 7100000002 HC RECOVERY ROOM TIME - EACH INCREMENTAL 1 MINUTE: Performed by: STUDENT IN AN ORGANIZED HEALTH CARE EDUCATION/TRAINING PROGRAM

## 2025-06-13 PROCEDURE — 3600000008 HC OR TIME - EACH INCREMENTAL 1 MINUTE - PROCEDURE LEVEL THREE: Performed by: STUDENT IN AN ORGANIZED HEALTH CARE EDUCATION/TRAINING PROGRAM

## 2025-06-13 PROCEDURE — 2500000004 HC RX 250 GENERAL PHARMACY W/ HCPCS (ALT 636 FOR OP/ED): Mod: JZ | Performed by: ANESTHESIOLOGY

## 2025-06-13 RX ORDER — PROPOFOL 10 MG/ML
INJECTION, EMULSION INTRAVENOUS AS NEEDED
Status: DISCONTINUED | OUTPATIENT
Start: 2025-06-13 | End: 2025-06-13

## 2025-06-13 RX ORDER — LABETALOL HYDROCHLORIDE 5 MG/ML
5 INJECTION, SOLUTION INTRAVENOUS ONCE AS NEEDED
Status: DISCONTINUED | OUTPATIENT
Start: 2025-06-13 | End: 2025-06-13 | Stop reason: HOSPADM

## 2025-06-13 RX ORDER — CEFAZOLIN 1 G/1
INJECTION, POWDER, FOR SOLUTION INTRAVENOUS AS NEEDED
Status: DISCONTINUED | OUTPATIENT
Start: 2025-06-13 | End: 2025-06-13

## 2025-06-13 RX ORDER — BUPIVACAINE HYDROCHLORIDE 5 MG/ML
INJECTION, SOLUTION EPIDURAL; INTRACAUDAL; PERINEURAL AS NEEDED
Status: DISCONTINUED | OUTPATIENT
Start: 2025-06-13 | End: 2025-06-13 | Stop reason: HOSPADM

## 2025-06-13 RX ORDER — ONDANSETRON HYDROCHLORIDE 2 MG/ML
4 INJECTION, SOLUTION INTRAVENOUS ONCE AS NEEDED
Status: DISCONTINUED | OUTPATIENT
Start: 2025-06-13 | End: 2025-06-13 | Stop reason: HOSPADM

## 2025-06-13 RX ORDER — LIDOCAINE HYDROCHLORIDE 20 MG/ML
INJECTION, SOLUTION EPIDURAL; INFILTRATION; INTRACAUDAL; PERINEURAL AS NEEDED
Status: DISCONTINUED | OUTPATIENT
Start: 2025-06-13 | End: 2025-06-13

## 2025-06-13 RX ORDER — DROPERIDOL 2.5 MG/ML
0.62 INJECTION, SOLUTION INTRAMUSCULAR; INTRAVENOUS ONCE AS NEEDED
Status: DISCONTINUED | OUTPATIENT
Start: 2025-06-13 | End: 2025-06-13 | Stop reason: HOSPADM

## 2025-06-13 RX ORDER — SODIUM CHLORIDE, SODIUM LACTATE, POTASSIUM CHLORIDE, CALCIUM CHLORIDE 600; 310; 30; 20 MG/100ML; MG/100ML; MG/100ML; MG/100ML
75 INJECTION, SOLUTION INTRAVENOUS CONTINUOUS
Status: DISCONTINUED | OUTPATIENT
Start: 2025-06-13 | End: 2025-06-13 | Stop reason: HOSPADM

## 2025-06-13 RX ORDER — ACETAMINOPHEN 325 MG/1
975 TABLET ORAL ONCE
Status: COMPLETED | OUTPATIENT
Start: 2025-06-13 | End: 2025-06-13

## 2025-06-13 RX ORDER — KETOROLAC TROMETHAMINE 30 MG/ML
INJECTION, SOLUTION INTRAMUSCULAR; INTRAVENOUS AS NEEDED
Status: DISCONTINUED | OUTPATIENT
Start: 2025-06-13 | End: 2025-06-13

## 2025-06-13 RX ORDER — FENTANYL CITRATE 50 UG/ML
INJECTION, SOLUTION INTRAMUSCULAR; INTRAVENOUS AS NEEDED
Status: DISCONTINUED | OUTPATIENT
Start: 2025-06-13 | End: 2025-06-13

## 2025-06-13 RX ORDER — OXYCODONE HYDROCHLORIDE 5 MG/1
5 TABLET ORAL EVERY 4 HOURS PRN
Status: DISCONTINUED | OUTPATIENT
Start: 2025-06-13 | End: 2025-06-13 | Stop reason: HOSPADM

## 2025-06-13 RX ORDER — MIDAZOLAM HYDROCHLORIDE 1 MG/ML
INJECTION INTRAMUSCULAR; INTRAVENOUS AS NEEDED
Status: DISCONTINUED | OUTPATIENT
Start: 2025-06-13 | End: 2025-06-13

## 2025-06-13 RX ORDER — PHENYLEPHRINE HCL IN 0.9% NACL 1 MG/10 ML
SYRINGE (ML) INTRAVENOUS AS NEEDED
Status: DISCONTINUED | OUTPATIENT
Start: 2025-06-13 | End: 2025-06-13

## 2025-06-13 RX ORDER — OXYCODONE HYDROCHLORIDE 5 MG/1
5 TABLET ORAL EVERY 6 HOURS PRN
Qty: 5 TABLET | Refills: 0 | Status: SHIPPED | OUTPATIENT
Start: 2025-06-13

## 2025-06-13 RX ORDER — SODIUM CHLORIDE, SODIUM LACTATE, POTASSIUM CHLORIDE, CALCIUM CHLORIDE 600; 310; 30; 20 MG/100ML; MG/100ML; MG/100ML; MG/100ML
INJECTION, SOLUTION INTRAVENOUS CONTINUOUS PRN
Status: DISCONTINUED | OUTPATIENT
Start: 2025-06-13 | End: 2025-06-13

## 2025-06-13 RX ORDER — ONDANSETRON HYDROCHLORIDE 2 MG/ML
INJECTION, SOLUTION INTRAVENOUS AS NEEDED
Status: DISCONTINUED | OUTPATIENT
Start: 2025-06-13 | End: 2025-06-13

## 2025-06-13 RX ORDER — SODIUM CHLORIDE 0.9 G/100ML
INJECTION, SOLUTION IRRIGATION AS NEEDED
Status: DISCONTINUED | OUTPATIENT
Start: 2025-06-13 | End: 2025-06-13 | Stop reason: HOSPADM

## 2025-06-13 RX ADMIN — FENTANYL CITRATE 100 MCG: 50 INJECTION, SOLUTION INTRAMUSCULAR; INTRAVENOUS at 12:50

## 2025-06-13 RX ADMIN — Medication 50 MCG: at 13:04

## 2025-06-13 RX ADMIN — DEXAMETHASONE SODIUM PHOSPHATE 4 MG: 4 INJECTION, SOLUTION INTRAMUSCULAR; INTRAVENOUS at 12:52

## 2025-06-13 RX ADMIN — OXYCODONE HYDROCHLORIDE 5 MG: 5 TABLET ORAL at 14:14

## 2025-06-13 RX ADMIN — ACETAMINOPHEN 975 MG: 325 TABLET, FILM COATED ORAL at 14:26

## 2025-06-13 RX ADMIN — LIDOCAINE HYDROCHLORIDE 100 MG: 20 INJECTION, SOLUTION EPIDURAL; INFILTRATION; INTRACAUDAL; PERINEURAL at 12:50

## 2025-06-13 RX ADMIN — SODIUM CHLORIDE, POTASSIUM CHLORIDE, SODIUM LACTATE AND CALCIUM CHLORIDE: 600; 310; 30; 20 INJECTION, SOLUTION INTRAVENOUS at 12:38

## 2025-06-13 RX ADMIN — PROPOFOL 100 MG: 10 INJECTION, EMULSION INTRAVENOUS at 12:50

## 2025-06-13 RX ADMIN — Medication 50 MCG: at 13:25

## 2025-06-13 RX ADMIN — MIDAZOLAM HYDROCHLORIDE 1 MG: 1 INJECTION, SOLUTION INTRAMUSCULAR; INTRAVENOUS at 12:38

## 2025-06-13 RX ADMIN — KETOROLAC TROMETHAMINE 15 MG: 30 INJECTION, SOLUTION INTRAMUSCULAR; INTRAVENOUS at 13:45

## 2025-06-13 RX ADMIN — Medication 100 MCG: at 13:13

## 2025-06-13 RX ADMIN — ONDANSETRON 4 MG: 2 INJECTION, SOLUTION INTRAMUSCULAR; INTRAVENOUS at 13:34

## 2025-06-13 RX ADMIN — Medication 100 MCG: at 13:34

## 2025-06-13 RX ADMIN — MIDAZOLAM HYDROCHLORIDE 1 MG: 1 INJECTION, SOLUTION INTRAMUSCULAR; INTRAVENOUS at 12:45

## 2025-06-13 RX ADMIN — CEFAZOLIN 2 G: 1 INJECTION, POWDER, FOR SOLUTION INTRAMUSCULAR; INTRAVENOUS at 12:48

## 2025-06-13 RX ADMIN — PROPOFOL 30 MG: 10 INJECTION, EMULSION INTRAVENOUS at 13:06

## 2025-06-13 RX ADMIN — Medication 50 MCG: at 13:19

## 2025-06-13 RX ADMIN — PROPOFOL 50 MG: 10 INJECTION, EMULSION INTRAVENOUS at 13:28

## 2025-06-13 RX ADMIN — HYDROMORPHONE HYDROCHLORIDE 0.5 MG: 1 INJECTION, SOLUTION INTRAMUSCULAR; INTRAVENOUS; SUBCUTANEOUS at 14:33

## 2025-06-13 RX ADMIN — PROPOFOL 20 MG: 10 INJECTION, EMULSION INTRAVENOUS at 13:07

## 2025-06-13 ASSESSMENT — PAIN DESCRIPTION - DESCRIPTORS
DESCRIPTORS: SORE

## 2025-06-13 ASSESSMENT — PAIN DESCRIPTION - LOCATION
LOCATION: ARM

## 2025-06-13 ASSESSMENT — PAIN DESCRIPTION - ORIENTATION
ORIENTATION: RIGHT;LOWER;OUTER
ORIENTATION: LEFT;LOWER;OUTER
ORIENTATION: RIGHT;POSTERIOR

## 2025-06-13 ASSESSMENT — PAIN SCALES - GENERAL
PAINLEVEL_OUTOF10: 0 - NO PAIN
PAINLEVEL_OUTOF10: 0 - NO PAIN
PAINLEVEL_OUTOF10: 3
PAINLEVEL_OUTOF10: 10 - WORST POSSIBLE PAIN
PAINLEVEL_OUTOF10: 5 - MODERATE PAIN
PAINLEVEL_OUTOF10: 3
PAINLEVEL_OUTOF10: 3
PAINLEVEL_OUTOF10: 5 - MODERATE PAIN
PAINLEVEL_OUTOF10: 2

## 2025-06-13 ASSESSMENT — PAIN - FUNCTIONAL ASSESSMENT
PAIN_FUNCTIONAL_ASSESSMENT: UNABLE TO SELF-REPORT
PAIN_FUNCTIONAL_ASSESSMENT: 0-10
PAIN_FUNCTIONAL_ASSESSMENT: UNABLE TO SELF-REPORT
PAIN_FUNCTIONAL_ASSESSMENT: 0-10

## 2025-06-13 ASSESSMENT — COLUMBIA-SUICIDE SEVERITY RATING SCALE - C-SSRS
1. IN THE PAST MONTH, HAVE YOU WISHED YOU WERE DEAD OR WISHED YOU COULD GO TO SLEEP AND NOT WAKE UP?: NO
6. HAVE YOU EVER DONE ANYTHING, STARTED TO DO ANYTHING, OR PREPARED TO DO ANYTHING TO END YOUR LIFE?: NO
2. HAVE YOU ACTUALLY HAD ANY THOUGHTS OF KILLING YOURSELF?: NO

## 2025-06-13 NOTE — ANESTHESIA PROCEDURE NOTES
Airway  Date/Time: 6/13/2025 12:51 PM  Reason: elective    Airway not difficult    Staffing  Performed: SRNA   Authorized by: Sav Linares MD    Performed by: XOCHITL Viera-CRNA, SHELDON  Patient location during procedure: OR    Patient Condition  Indications for airway management: anesthesia and airway protection  Patient position: sniffing  MILS not maintained throughout  Sedation level: deep     Final Airway Details   Preoxygenated: yes  Final airway type: supraglottic airway  Successful airway: Supraglottic airway: IGEL.  Size: 4   Ventilation between attempts: none  Number of attempts at approach: 1  Number of other approaches attempted: 0    Additional Comments  LMA inserted into mouth with ease placed by Lyndsey RAZO. No change in dentition, no complications.

## 2025-06-13 NOTE — ANESTHESIA POSTPROCEDURE EVALUATION
Patient: Marsha Tapia    Procedure Summary       Date: 06/13/25 Room / Location: [facility] OR 09 / [facility] OR    Anesthesia Start: 1238 Anesthesia Stop: 1359    Procedure: Right Shoulder Axillary Mass/Lesion Excision (Right: Shoulder) Diagnosis:       Axillary mass, right      (Axillary mass, right [R22.31])    Surgeons: Hasmukh Daniels MD Responsible Provider: Sav Linares MD    Anesthesia Type: general ASA Status: 3            Anesthesia Type: No value filed.    Vitals Value Taken Time   /54 06/13/25 14:31   Temp 36.1 °C (97 °F) 06/13/25 14:00   Pulse 64 06/13/25 14:33   Resp 14 06/13/25 14:15   SpO2 100 % 06/13/25 14:33   Vitals shown include unfiled device data.    Anesthesia Post Evaluation    Patient location during evaluation: PACU  Patient participation: complete - patient participated  Level of consciousness: awake  Pain management: adequate  Airway patency: patent  Cardiovascular status: acceptable  Respiratory status: acceptable  Hydration status: acceptable  Postoperative Nausea and Vomiting: none        No notable events documented.

## 2025-06-13 NOTE — ANESTHESIA PREPROCEDURE EVALUATION
Patient: Marsha Tapia    Procedure Information       Anesthesia Start Date/Time: 06/13/25 1238    Procedure: Right Shoulder Axillary Mass/Lesion Excision (Right: Shoulder)    [facility]: [facility] / [facility]    Surgeons: Hasmukh Daniels MD            Relevant Problems   Cardiac   (+) Atypical chest pain   (+) Benign essential HTN   (+) Chest pain   (+) Chest wall pain   (+) HLD (hyperlipidemia)   (+) ST elevation myocardial infarction (STEMI), unspecified artery (Multi)   (+) Sinus bradycardia      Neuro   (+) Bilateral carpal tunnel syndrome   (+) Sciatica of left side      GI   (+) GIB (gastrointestinal bleeding)   (+) Gastroesophageal reflux disease with esophagitis      Endocrine   (+) DM (diabetes mellitus), type 2 (Multi)      Hematology   (+) Anemia due to vitamin B12 deficiency      Musculoskeletal   (+) Bilateral carpal tunnel syndrome      HEENT   (+) Asymmetrical sensorineural hearing loss   (+) Chronic pansinusitis      ID   (+) Genital herpes in women   (+) Tinea corporis      Skin   (+) Other lichen planus   (+) Rash and other nonspecific skin eruption      GYN   (+) Breast cancer       Clinical information reviewed:   Tobacco  Allergies  Meds  Problems  Med Hx  Surg Hx  OB Status    Fam Hx  Soc Hx        NPO Detail:  NPO/Void Status  Carbohydrate Drink Given Prior to Surgery? : N  Date of Last Liquid: 06/13/25  Time of Last Liquid: 0630  Date of Last Solid: 06/12/25  Time of Last Solid: 1830  Last Intake Type: Clear fluids         Physical Exam    Airway  Mallampati: II     Cardiovascular   Rhythm: regular  Rate: normal     Dental    Pulmonary Breath sounds clear to auscultation     Abdominal            Anesthesia Plan    History of general anesthesia?: yes  History of complications of general anesthesia?: no    ASA 3     general   (Plan GA by LMA)  intravenous induction   Anesthetic plan and risks discussed with patient.    Plan discussed with CRNA.

## 2025-06-13 NOTE — OP NOTE
Right Shoulder Axillary Mass/Lesion Excision (R) Operative Note     Date: 2025  OR Location: [facility] A OR    Name: Marsha Tapia, : 1955, Age: 69 y.o., MRN: [id number], Sex: female    Diagnosis  Pre-op Diagnosis      * Axillary mass, right [R22.31] Post-op Diagnosis     * Axillary mass, right [R22.31]     Procedures  Excision of right axillary soft tissue mass    Surgeons      * Hasmukh Daniels - Primary    Resident/Fellow/Other Assistant:  Surgeons and Role:  * No surgeons found with a matching role *    Staff:   Surgical Assistant:   Circulator: Jesus Stiles Person: Mary    Anesthesia Staff: Anesthesiologist: Sav Linares MD  CRNA: CARLITO Viera DNAP  SRNA: Lyndsey Carmona RN    Procedure Summary  Anesthesia: Anesthesia type not filed in the log.  ASA: ASA status not filed in the log.  Estimated Blood Loss: 5mL  Intra-op Medications:   Administrations occurring from 1145 to 1315 on 25:   Medication Name Total Dose   sodium chloride 0.9 % irrigation solution 1,000 mL   ceFAZolin (Ancef) vial 1 g 2 g   dexAMETHasone (Decadron) 4 mg/mL IV Syringe 2 mL 4 mg   fentaNYL (Sublimaze) injection 50 mcg/mL 100 mcg   lactated Ringer's infusion Cannot be calculated   lidocaine PF (Xylocaine-MPF) local injection 2 % 100 mg   midazolam PF (Versed) injection 1 mg/mL 2 mg   phenylephrine 100 mcg/mL syringe 10 mL (prefilled) 150 mcg   propofol (Diprivan) injection 10 mg/mL 150 mg              Anesthesia Record               Intraprocedure I/O Totals          Intake    lactated Ringer's 600.00 mL    Total Intake 600 mL       Output    Urine 0 mL    Est. Blood Loss 5 mL    Total Output 5 mL       Net    Net Volume 595 mL          Specimen:   ID Type Source Tests Collected by Time   1 : Right Axillary Soft Tissue Mass Tissue SOFT TISSUE MASS RESECTION SURGICAL PATHOLOGY EXAM Hasmukh Daniels MD 2025 1333   B : Right Axillary Fluid Collection Swab AXILLARY CONTENTS TISSUE/WOUND CULTURE/SMEAR Hasmukh CABALLERO  MD Frances 6/13/2025 3727                 Drains and/or Catheters: * None in log *    Tourniquet Times:         Implants:     Findings: right axillary chronic fluid collection    Indications: Marsha Tapia is an 69 y.o. female who is having surgery for Axillary mass, right [R22.31].     The patient was seen in the preoperative area. The risks, benefits, complications, treatment options, non-operative alternatives, expected recovery and outcomes were discussed with the patient. The possibilities of reaction to medication, pulmonary aspiration, injury to surrounding structures, bleeding, recurrent infection, the need for additional procedures, failure to diagnose a condition, and creating a complication requiring transfusion or operation were discussed with the patient. The patient concurred with the proposed plan, giving informed consent.  The site of surgery was properly noted/marked if necessary per policy. The patient has been actively warmed in preoperative area. Preoperative antibiotics have been ordered and given within 1 hours of incision. Venous thrombosis prophylaxis have been ordered including bilateral sequential compression devices    Procedure Details:   Patient was taken to the operating room after a surgical huddle, general anesthesia was induced.  The right axilla was prepped and draped using chlorhexidine in the usual sterile fashion.  After surgical timeout, a transverse incision was made in the right anterior axilla/inferior shoulder overlying the palpable soft tissue mass.  The subcutaneous tissues were divided with electrocautery and blunt dissection.  The soft tissue mass was encountered which was consistent with a chronic fluid cavity.  This was  from the surrounding tissue using blunt dissection electrocautery.  There was a fair bit of intramuscular extension of this cavity which was  from the surrounding muscle.  During this dissection a small amount of fluid was seen coming  from the fluid collection which was cultured.  The entire soft tissue mass was excised from the surrounding tissues and the posterior attachments were taken with electrocautery.  This completed the dissection, the mass measured approximately 4 x 2 cm.  The wound was copiously irrigated with Irrisept.  The deep soft tissues and muscles were reapproximated using interrupted 3-0 Vicryl suture.  The subcutaneous tissues were then reapproximated with multiple interrupted 3-0 Vicryl sutures.  The dermis was then reapproximated with multiple interrupted 3-0 Vicryl sutures and finally the skin was run with a 4-0 Vicryl suture.  Skin glue was applied as a dressing.  The patient tolerated procedure well was taken to PACU in stable condition.    Evidence of Infection: No   Complications:  None; patient tolerated the procedure well.    Disposition: PACU - hemodynamically stable.  Condition: stable     Additional Details:     Attending Attestation: I was present and scrubbed for the entire procedure.    Hasmukh Daniels  Phone Number: [phone]

## 2025-06-15 LAB
BACTERIA SPEC CULT: NORMAL
GRAM STN SPEC: NORMAL
GRAM STN SPEC: NORMAL

## 2025-06-16 ENCOUNTER — PHARMACY VISIT (OUTPATIENT)
Dept: PHARMACY | Facility: CLINIC | Age: 70
End: 2025-06-16
Payer: COMMERCIAL

## 2025-06-18 PROCEDURE — RXMED WILLOW AMBULATORY MEDICATION CHARGE

## 2025-06-20 ENCOUNTER — PHARMACY VISIT (OUTPATIENT)
Dept: PHARMACY | Facility: CLINIC | Age: 70
End: 2025-06-20
Payer: COMMERCIAL

## 2025-08-06 ENCOUNTER — OFFICE VISIT (OUTPATIENT)
Dept: SURGERY | Facility: CLINIC | Age: 70
End: 2025-08-06
Payer: MEDICARE

## 2025-08-06 VITALS
OXYGEN SATURATION: 99 % | SYSTOLIC BLOOD PRESSURE: 153 MMHG | TEMPERATURE: 96.6 F | DIASTOLIC BLOOD PRESSURE: 56 MMHG | BODY MASS INDEX: 27.83 KG/M2 | HEART RATE: 60 BPM | HEIGHT: 64 IN | WEIGHT: 163 LBS

## 2025-08-06 DIAGNOSIS — M96.842 POSTOPERATIVE SEROMA OF MUSCULOSKELETAL STRUCTURE AFTER MUSCULOSKELETAL PROCEDURE: Primary | ICD-10-CM

## 2025-08-06 PROCEDURE — 99213 OFFICE O/P EST LOW 20 MIN: CPT | Performed by: STUDENT IN AN ORGANIZED HEALTH CARE EDUCATION/TRAINING PROGRAM

## 2025-08-06 ASSESSMENT — PAIN SCALES - GENERAL: PAINLEVEL_OUTOF10: 4

## 2025-08-06 NOTE — PROGRESS NOTES
History Of Present Illness  Patient is a 70 y.o. female who presents to discuss further workup and or possible invention for a draining right axillary cyst.  She has had multiple soft tissue issues after a right shoulder replacement.  Recently she underwent debridement of a chronic abscess cavity overlying the anterior right shoulder which she has recovered quite well from.  However about a week ago she noticed swelling in the right upper axilla.  This is swelled and continue to enlarge and was quite painful.  However there is no significant surrounding redness.  The area did spontaneously drain 3 days ago.  It did drain what sounded like serous fluid, did not sound purulent, no foul smell, no surrounding erythema.  She states now it is continuing to drain a very small amount of serous fluid however the symptoms related to the area have completely resolved now that the area has spontaneously drained.  Of note, she did have a previous cyst partially excised about 2 to 3 years ago in the same area.     Past Medical History  Medical History[1]    Surgical History  Surgical History[2]     Social History  She reports that she has never smoked. She has never used smokeless tobacco. She reports that she does not drink alcohol and does not use drugs.    Family History  Family History[3]     Allergies  Ace inhibitors    Review of Systems  - CONSTITUTIONAL: Denies fever and chills.  - HEENT: Denies changes in vision and hearing.  - RESPIRATORY: Denies SOB and cough.  - CV: Denies palpitations and CP.  - GI: see HPI  - : Denies dysuria and urinary frequency.  - MSK: Denies myalgia and joint pain.  - SKIN: Denies rash and pruritus.  - NEUROLOGICAL: Denies headache and syncope.  - PSYCHIATRIC: Denies recent changes in mood. Denies anxiety and depression.     Physical Exam  - GENERAL: Alert and oriented x 3. No acute distress. Well-nourished.  - EYES: EOMI. Anicteric.  - HENT: Moist mucous membranes. No scleral icterus. No  "cervical lymphadenopathy.  - LUNGS: Breathing comfortably on room air. No accessory muscle use, no distress.  - CARDIOVASCULAR: Regular rate and rhythm. No murmur. No JVD.  - ABDOMEN: Soft, non-tender and non-distended. No palpable masses.  - EXTREMITIES: In the upper middle right axilla is a small punctate wound draining minimal serous fluid, no palpable residual fluid collections, no surrounding erythema induration or other palpable fluid collections.  - SKIN: No rashes or lesions. Warm.  - NEUROLOGIC: No focal neurological deficits. CN II-XII grossly intact, but not individually tested.  - PSYCHIATRIC: Cooperative. Appropriate mood and affect.    Last Recorded Vitals  Blood pressure 153/56, pulse 60, temperature 35.9 °C (96.6 °F), height 1.626 m (5' 4\"), weight 73.9 kg (163 lb), SpO2 99%.    Relevant Results  No results found.     Assessment and Plan  Patient is a 70 y.o. female who presents to discuss further workup and or possible invention for a right axillary draining cyst.  This does not appear to be a sebaceous cyst or an abscess cavity, this does appear to be a possible seroma.  We discussed that there has been 2 to 3 years since the previous intervention in this area and therefore it is possible that this current wound will heal and that she goes another 2 to 3 years without issue.  However we did discuss the very real possibility that the cyst could very soon recur and cause repeated symptoms.  After further discussions with the patient, she really wishes to avoid further interventions to her right shoulder and axilla as she has had multiple procedures and is looking to avoid further interventions at this time.  We did discuss that a watchful waiting approach is certainly safe as this was not an infectious etiology and that if the cyst were to recur or become enlarged again to present back to the clinic for expedited removal.  Her questions were answered at this time she can follow-up as " needed.    Hasmukh Daniels MD         [1]   Past Medical History:  Diagnosis Date    Atypical ductal hyperplasia, breast 2016    BRCA1 gene mutation positive 2016    BRCA2 gene mutation positive 2016    Breast cancer 2016    Breast cyst 2007    Breast injury 2016    Endometrial cancer (Multi) 2016    Fibrocystic breast 2016    Hx antineoplastic chemo     Lobular carcinoma in situ 2016    Localized enlarged lymph nodes 06/22/2021    Axillary adenopathy    Personal history of irradiation     Personal history of malignant neoplasm of breast     History of malignant neoplasm of left breast    Usual hyperplasia of lactiferous duct 2016   [2]   Past Surgical History:  Procedure Laterality Date    BREAST BIOPSY Right 2016    BREAST CYST EXCISION Right 1980    BREAST LUMPECTOMY Right 2016    CARDIAC CATHETERIZATION N/A 01/12/2025    Procedure: Left Heart Cath, No LV;  Surgeon: Brett Chavez MD;  Location: [facility] Cardiac Cath Lab;  Service: Cardiovascular;  Laterality: N/A;    CARDIAC CATHETERIZATION N/A 01/12/2025    Procedure: PCI;  Surgeon: Brett Chavez MD;  Location: [facility] Cardiac Cath Lab;  Service: Cardiovascular;  Laterality: N/A;    HYSTERECTOMY  2014    MASS EXCISION Right 06/13/2025    Right Shoulder Axillary Mass Excision    MR HEAD ANGIO WO IV CONTRAST  07/20/2021    MR HEAD ANGIO WO IV CONTRAST 7/20/2021 [facility]    MR NECK ANGIO WO IV CONTRAST  07/20/2021    MR NECK ANGIO WO IV CONTRAST 7/20/2021 [facility]    OTHER SURGICAL HISTORY Right 10/12/2020    Shoulder replacement    OTHER SURGICAL HISTORY  10/12/2020    Bariatric surgery    OTHER SURGICAL HISTORY Bilateral 10/12/2020    Cataract surgery    OTHER SURGICAL HISTORY  10/12/2020    Hysterectomy    OTHER SURGICAL HISTORY  10/28/2022    Lumpectomy    OTHER SURGICAL HISTORY  09/30/2021    Cyst excision   [3] No family history on file.

## 2025-08-26 ENCOUNTER — APPOINTMENT (OUTPATIENT)
Dept: PRIMARY CARE | Facility: CLINIC | Age: 70
End: 2025-08-26
Payer: MEDICARE

## (undated) DEVICE — GLOVE, SURGICAL, PROTEXIS PI BLUE W/NEUTHERA, 8.0, PF, LF

## (undated) DEVICE — Device

## (undated) DEVICE — WOUND SYSTEM, DEBRIDEMENT & CLEANING, O.R DUOPAK

## (undated) DEVICE — SUTURE, VICRYL PLUS 3-0, SH, 27IN

## (undated) DEVICE — GUIDEWIRE, RUN THROUGH WIRE, 180CM

## (undated) DEVICE — APPLICATOR, CHLORAPREP, W/ORANGE TINT, 26ML

## (undated) DEVICE — CLOSURE DEVICE, VASCULAR, ANGIO-SEAL, VIP, 6FR, LF

## (undated) DEVICE — GUIDEWIRE, INQWIRE, 3MM J, .035 X 210CM, FIXED

## (undated) DEVICE — TOWEL, SURGICAL, NEURO, O/R, 16 X 26, BLUE, STERILE

## (undated) DEVICE — DRAPE, SHEET, ENDOSCOPY, GENERAL, FENESTRATED, ARMBOARD COVER, 98 X 123.5 IN, DISPOSABLE, LF, STERILE

## (undated) DEVICE — CATHETER KIT, ASPIRATION, 044 CORONARY SYSTEM, 140CM

## (undated) DEVICE — CATHETER, BALLOON DILATION, EUPHORA SEMICOMPLIANT 2.0  X 12 MM X 142CM

## (undated) DEVICE — ADHESIVE, SKIN, DERMABOND ADVANCED, 15CM, PEN-STYLE

## (undated) DEVICE — NITINOL KIT, GLIDESHEATH, 6FR

## (undated) DEVICE — GUIDEWIRE, AMPLATZ SUPER STIFF, STR, .035 IN X 75CM

## (undated) DEVICE — GUIDEWIRE, ANGLE TIP,  .035 DIA, 180 CM, 3 CM TIP"

## (undated) DEVICE — VALVE, HEMOSTASIS, GUARDIAN II NC, W/ GUIDEWIRE INSERTION TOOL

## (undated) DEVICE — INTRODUCER, MICRO, 4FR, 7CM ECHO

## (undated) DEVICE — CATHETER, DRAGONFLY, OPSTAR

## (undated) DEVICE — GUIDEWIRE, INQWIRE, 3MM J, .035, 150

## (undated) DEVICE — CATHETER, BALLOON, NC EUPHORA NONCOMPLIANT 2.75 X 15 X 142CM

## (undated) DEVICE — SHEATH, PINNACLE, 10 CM,  6FR INTRODUCER, 6FR DIA, 2.5 CM DIALATOR

## (undated) DEVICE — SUTURE, VICRYL PLUS, 4-0, 27 IN, PS-2

## (undated) DEVICE — TR BAND, RADIAL COMPRESSION, STANDARD, 24CM

## (undated) DEVICE — INFLATION KIT, ADVANTAGE, ENCORE 26 (1/BOX)

## (undated) DEVICE — GUIDEWIRE, INQWIRE, 0.025 X 150CM, FIXED CORE, 3MM J-TIP

## (undated) DEVICE — CATHETER, GUIDING, HEARTRAIL III, 6 FR IKARI LEFT 3.5